# Patient Record
Sex: MALE | Race: WHITE | NOT HISPANIC OR LATINO | Employment: OTHER | URBAN - METROPOLITAN AREA
[De-identification: names, ages, dates, MRNs, and addresses within clinical notes are randomized per-mention and may not be internally consistent; named-entity substitution may affect disease eponyms.]

---

## 2017-08-23 ENCOUNTER — OFFICE VISIT (OUTPATIENT)
Dept: SURGERY | Facility: CLINIC | Age: 82
End: 2017-08-23

## 2017-08-23 VITALS
HEIGHT: 68 IN | HEART RATE: 75 BPM | WEIGHT: 205.2 LBS | SYSTOLIC BLOOD PRESSURE: 102 MMHG | BODY MASS INDEX: 31.1 KG/M2 | RESPIRATION RATE: 14 BRPM | DIASTOLIC BLOOD PRESSURE: 66 MMHG | OXYGEN SATURATION: 90 %

## 2017-08-23 DIAGNOSIS — C34.31 MALIGNANT NEOPLASM OF LOWER LOBE OF RIGHT LUNG (HCC): ICD-10-CM

## 2017-08-23 DIAGNOSIS — R91.1 NODULE OF RIGHT LUNG: Primary | ICD-10-CM

## 2017-08-23 PROCEDURE — 99213 OFFICE O/P EST LOW 20 MIN: CPT | Performed by: THORACIC SURGERY (CARDIOTHORACIC VASCULAR SURGERY)

## 2017-08-23 RX ORDER — MONTELUKAST SODIUM 10 MG/1
10 TABLET ORAL NIGHTLY
COMMUNITY

## 2017-08-23 NOTE — PROGRESS NOTES
Subjective   Patient ID: Alexander Rouse is a 81 y.o. male is here today for follow-up.    History of Present Illness  Dear Colleague,  Alexander Rouse was seen in our office today for further follow-up of a right VAT with wedge resection of a stage I adenocarcinoma of the lung performed August 2012.  He had adjuvant radiation therapy because of close margins of resection.  Approximately one year later there was a mass in the right lung that was PET positive.  Biopsy showed this only to be fibrosis thought to be secondary to the radiation.  He has had no further problems.  He has chronic obstructive pulmonary disease with chronic bronchitis.  He coughs frequently and usually brings up thick phlegm.  He has had no hemoptysis.  There has been no pleuritic pain.  He has no hoarseness or change in his voice.  His shortness of breath with exertion is essentially unchanged.  He does wheeze at times especially with exertion.  He has had no weight loss.    The following portions of the patient's history were reviewed and updated as appropriate: allergies, current medications, past family history, past medical history, past social history, past surgical history and problem list.  Review of Systems   Constitution: Negative.   HENT: Negative.    Eyes: Negative.    Cardiovascular: Negative.    Respiratory: Positive for shortness of breath and wheezing.    Endocrine: Negative.    Hematologic/Lymphatic: Negative.    Skin: Negative.    Musculoskeletal: Negative.    Gastrointestinal: Negative.    Genitourinary: Negative.    Neurological: Negative.    Psychiatric/Behavioral: Negative.      Patient Active Problem List   Diagnosis   • CAFL (chronic airflow limitation)   • Cough   • Cancer of lower lobe of lung   • Disease of larynx   • Rotator cuff tear arthropathy   • Status post complete repair of rotator cuff   • Nodule of right lung     Past Medical History:   Diagnosis Date   • Asthma    • COPD (chronic obstructive pulmonary disease)       Past Surgical History:   Procedure Laterality Date   • LUNG CANCER SURGERY     • SHOULDER SURGERY Bilateral      Family History   Problem Relation Age of Onset   • Family history unknown: Yes     Social History     Social History   • Marital status:      Spouse name: N/A   • Number of children: N/A   • Years of education: N/A     Occupational History   • Not on file.     Social History Main Topics   • Smoking status: Current Every Day Smoker   • Smokeless tobacco: Not on file   • Alcohol use No   • Drug use: No   • Sexual activity: No     Other Topics Concern   • Not on file     Social History Narrative       Current Outpatient Prescriptions:   •  albuterol (PROVENTIL) (2.5 MG/3ML) 0.083% nebulizer solution, Take 2.5 mg by nebulization every 4 (four) hours as needed for wheezing., Disp: , Rfl:   •  apixaban (ELIQUIS) 2.5 MG tablet tablet, Take 2.5 mg by mouth 2 (Two) Times a Day., Disp: , Rfl:   •  aspirin 81 MG tablet, Take  by mouth., Disp: , Rfl:   •  atorvastatin (LIPITOR) 80 MG tablet, Take  by mouth., Disp: , Rfl:   •  carvedilol (COREG) 3.125 MG tablet, Take  by mouth., Disp: , Rfl:   •  hydrochlorothiazide (MICROZIDE) 12.5 MG capsule, , Disp: , Rfl:   •  lisinopril (PRINIVIL,ZESTRIL) 5 MG tablet, , Disp: , Rfl:   •  montelukast (SINGULAIR) 10 MG tablet, Take 10 mg by mouth Every Night., Disp: , Rfl:   •  Selenium 200 MCG tablet, Take  by mouth., Disp: , Rfl:   •  selenium sulfide (SELSUN) 2.5 % shampoo, Apply  topically daily as needed for dandruff., Disp: , Rfl:   No Known Allergies     Objective   Vitals:    08/23/17 0917   BP: 102/66   Pulse: 75   Resp: 14   SpO2: 90%     Physical Exam   Constitutional: He is oriented to person, place, and time. He appears well-developed and well-nourished.   HENT:   Head: Normocephalic.   Eyes: Conjunctivae, EOM and lids are normal. Pupils are equal, round, and reactive to light.   Neck: Trachea normal and normal range of motion. Neck supple. No hepatojugular  reflux and no JVD present. Carotid bruit is not present. No thyroid mass and no thyromegaly present.   Cardiovascular: Normal rate, regular rhythm, S1 normal, S2 normal, normal heart sounds and normal pulses.   No extrasystoles are present. PMI is not displaced.    Pulmonary/Chest: Effort normal. He has decreased breath sounds in the right middle field, the right lower field and the left lower field. He has rales in the right lower field and the left lower field.   Abdominal: Soft. Normal appearance and bowel sounds are normal. He exhibits no mass. There is no hepatosplenomegaly. There is no tenderness. No hernia.   Musculoskeletal: Normal range of motion.   Neurological: He is alert and oriented to person, place, and time. He has normal strength and normal reflexes. No cranial nerve deficit or sensory deficit. He displays a negative Romberg sign.   Skin: Skin is warm, dry and intact.   Psychiatric: He has a normal mood and affect. His speech is normal and behavior is normal. Judgment and thought content normal. Cognition and memory are normal.     Independent Review of Radiographic Studies:    CT scan of the chest performed at [facility] was independently reviewed.  There is a 1.7 cm irregular density at the apex of the right lung.  There are several areas of groundglass opacity within the posterior segment of the right upper lobe.  There is a nodular density measuring 0.9 x 0.4 cm in the mid zone of the right upper lobe.  There is stable fibrosis in the medial basilar segment of the right lower lobe.  There are no new infiltrates nodules or masses.  There is no hilar or mediastinal adenopathy.  There is no pleural effusion      Assessment/Plan     This new area in the right upper lobe is indeterminate.  This could certainly represent a new lung cancer or possibly mucus plugging in the bronchiols.  To help clarify this I have scheduled the patient for CT PET scan.  Once that is completed and I have  reviewed the results I will call the patient and we will then decide our next steps.  I will keep you informed of his progress.  Thank you for allowing me to participate in the care of Mr. Rouse.    Diagnoses and all orders for this visit:    Nodule of right lung  -     NM Pet Skull Base To Mid Thigh; Future    Malignant neoplasm of lower lobe of right lung  -     NM Pet Skull Base To Mid Thigh; Future    Other orders  -     apixaban (ELIQUIS) 2.5 MG tablet tablet; Take 2.5 mg by mouth 2 (Two) Times a Day.  -     montelukast (SINGULAIR) 10 MG tablet; Take 10 mg by mouth Every Night.

## 2017-09-05 ENCOUNTER — TELEPHONE (OUTPATIENT)
Dept: SURGERY | Facility: CLINIC | Age: 82
End: 2017-09-05

## 2017-09-05 NOTE — TELEPHONE ENCOUNTER
----- Message from Zuly Motta sent at 8/29/2017  3:33 PM EDT -----  Dr. Melara,    You ordered a PET scan for Mr. Rouse.  And you wanted to call him with the results.  The report is in Epic under the Media tab.  It was done at [facility].    Thanks,  Zuly    I spoke with Mr. Rouse by telephone today September 5, 2017.  I gave him the results of the CT PET scan.  This showed no abnormal uptake.  The area in question in the right upper lobe has low level uptake and may represent scar tissue.  I recommended a short-term follow-up CT in 4 months.  Office will make arrangements and he'll return here with the results of that CT scan

## 2017-09-11 DIAGNOSIS — C34.90 MALIGNANT NEOPLASM OF LUNG, UNSPECIFIED LATERALITY, UNSPECIFIED PART OF LUNG (HCC): Primary | ICD-10-CM

## 2018-01-24 ENCOUNTER — CONVERSION ENCOUNTER (OUTPATIENT)
Dept: CARDIOLOGY | Facility: CLINIC | Age: 83
End: 2018-01-24

## 2018-01-24 ENCOUNTER — OFFICE VISIT CONVERTED (OUTPATIENT)
Dept: CARDIOLOGY | Facility: CLINIC | Age: 83
End: 2018-01-24
Attending: INTERNAL MEDICINE

## 2018-01-31 ENCOUNTER — OFFICE VISIT (OUTPATIENT)
Dept: SURGERY | Facility: CLINIC | Age: 83
End: 2018-01-31

## 2018-01-31 VITALS
OXYGEN SATURATION: 90 % | HEART RATE: 86 BPM | SYSTOLIC BLOOD PRESSURE: 122 MMHG | WEIGHT: 226 LBS | DIASTOLIC BLOOD PRESSURE: 62 MMHG | BODY MASS INDEX: 34.25 KG/M2 | HEIGHT: 68 IN

## 2018-01-31 DIAGNOSIS — Z48.3 AFTERCARE FOLLOWING SURGERY FOR CANCER OR TUMOR: Primary | ICD-10-CM

## 2018-01-31 PROCEDURE — 99213 OFFICE O/P EST LOW 20 MIN: CPT | Performed by: NURSE PRACTITIONER

## 2018-07-25 ENCOUNTER — OFFICE VISIT CONVERTED (OUTPATIENT)
Dept: CARDIOLOGY | Facility: CLINIC | Age: 83
End: 2018-07-25
Attending: INTERNAL MEDICINE

## 2018-07-25 ENCOUNTER — CONVERSION ENCOUNTER (OUTPATIENT)
Dept: CARDIOLOGY | Facility: CLINIC | Age: 83
End: 2018-07-25

## 2019-02-05 ENCOUNTER — CONVERSION ENCOUNTER (OUTPATIENT)
Dept: CARDIOLOGY | Facility: CLINIC | Age: 84
End: 2019-02-05

## 2019-02-05 ENCOUNTER — OFFICE VISIT CONVERTED (OUTPATIENT)
Dept: CARDIOLOGY | Facility: CLINIC | Age: 84
End: 2019-02-05
Attending: INTERNAL MEDICINE

## 2019-05-20 ENCOUNTER — HOSPITAL ENCOUNTER (OUTPATIENT)
Dept: OTHER | Facility: HOSPITAL | Age: 84
Discharge: HOME | End: 2019-05-20

## 2019-08-06 ENCOUNTER — CONVERSION ENCOUNTER (OUTPATIENT)
Dept: CARDIOLOGY | Facility: CLINIC | Age: 84
End: 2019-08-06

## 2019-08-06 ENCOUNTER — OFFICE VISIT CONVERTED (OUTPATIENT)
Dept: CARDIOLOGY | Facility: CLINIC | Age: 84
End: 2019-08-06
Attending: INTERNAL MEDICINE

## 2020-01-01 ENCOUNTER — OFFICE VISIT CONVERTED (OUTPATIENT)
Dept: CARDIOLOGY | Facility: CLINIC | Age: 85
End: 2020-01-01
Attending: INTERNAL MEDICINE

## 2020-01-01 ENCOUNTER — HOSPITAL ENCOUNTER (OUTPATIENT)
Dept: OTHER | Facility: HOSPITAL | Age: 85
Discharge: HOME | End: 2020-08-12
Attending: FAMILY MEDICINE

## 2020-01-01 ENCOUNTER — CONVERSION ENCOUNTER (OUTPATIENT)
Dept: OTHER | Facility: HOSPITAL | Age: 85
End: 2020-01-01

## 2020-01-01 ENCOUNTER — HOSPITAL ENCOUNTER (OUTPATIENT)
Dept: OTHER | Facility: HOSPITAL | Age: 85
Discharge: HOME | End: 2020-10-12
Attending: INTERNAL MEDICINE

## 2020-01-01 ENCOUNTER — HOSPITAL ENCOUNTER (OUTPATIENT)
Dept: OTHER | Facility: HOSPITAL | Age: 85
Discharge: HOME | End: 2020-10-22

## 2020-01-01 ENCOUNTER — HOSPITAL ENCOUNTER (OUTPATIENT)
Dept: OTHER | Facility: HOSPITAL | Age: 85
Discharge: HOME | End: 2020-09-15
Attending: INTERNAL MEDICINE

## 2020-01-01 LAB
ALBUMIN SERPL-MCNC: 3.5 G/DL (ref 3.5–5)
ALBUMIN/GLOB SERPL: 1.2 {RATIO} (ref 1.4–2.6)
ALP SERPL-CCNC: 124 U/L (ref 56–155)
ALT SERPL-CCNC: 20 U/L (ref 10–40)
ANION GAP SERPL CALC-SCNC: 12 MMOL/L (ref 8–19)
ANION GAP SERPL CALC-SCNC: 12 MMOL/L (ref 8–19)
ANION GAP SERPL CALC-SCNC: 21 MMOL/L (ref 8–19)
AST SERPL-CCNC: 27 U/L (ref 15–50)
BILIRUB SERPL-MCNC: 0.21 MG/DL (ref 0.2–1.3)
BUN SERPL-MCNC: 26 MG/DL (ref 5–25)
BUN SERPL-MCNC: 27 MG/DL (ref 5–25)
BUN SERPL-MCNC: 34 MG/DL (ref 5–25)
BUN/CREAT SERPL: 24 {RATIO} (ref 6–20)
BUN/CREAT SERPL: 25 {RATIO} (ref 6–20)
BUN/CREAT SERPL: 31 {RATIO} (ref 6–20)
CALCIUM SERPL-MCNC: 10.2 MG/DL (ref 8.7–10.4)
CALCIUM SERPL-MCNC: 8.4 MG/DL (ref 8.7–10.4)
CALCIUM SERPL-MCNC: 9.2 MG/DL (ref 8.7–10.4)
CHLORIDE SERPL-SCNC: 100 MMOL/L (ref 99–111)
CHLORIDE SERPL-SCNC: 102 MMOL/L (ref 99–111)
CHLORIDE SERPL-SCNC: 98 MMOL/L (ref 99–111)
CHOLEST SERPL-MCNC: 126 MG/DL (ref 107–200)
CHOLEST/HDLC SERPL: 2.4 {RATIO} (ref 3–6)
CONV CO2: 24 MMOL/L (ref 22–32)
CONV CO2: 30 MMOL/L (ref 22–32)
CONV CO2: 34 MMOL/L (ref 22–32)
CONV TOTAL PROTEIN: 6.4 G/DL (ref 6.3–8.2)
CREAT UR-MCNC: 0.83 MG/DL (ref 0.7–1.2)
CREAT UR-MCNC: 1.09 MG/DL (ref 0.7–1.2)
CREAT UR-MCNC: 1.44 MG/DL (ref 0.7–1.2)
GFR SERPLBLD BASED ON 1.73 SQ M-ARVRAT: 44 ML/MIN/{1.73_M2}
GFR SERPLBLD BASED ON 1.73 SQ M-ARVRAT: >60 ML/MIN/{1.73_M2}
GFR SERPLBLD BASED ON 1.73 SQ M-ARVRAT: >60 ML/MIN/{1.73_M2}
GLOBULIN UR ELPH-MCNC: 2.9 G/DL (ref 2–3.5)
GLUCOSE SERPL-MCNC: 109 MG/DL (ref 70–99)
GLUCOSE SERPL-MCNC: 147 MG/DL (ref 70–99)
GLUCOSE SERPL-MCNC: 69 MG/DL (ref 70–99)
HDLC SERPL-MCNC: 52 MG/DL (ref 40–60)
LDLC SERPL CALC-MCNC: 59 MG/DL (ref 70–100)
OSMOLALITY SERPL CALC.SUM OF ELEC: 296 MOSM/KG (ref 273–304)
OSMOLALITY SERPL CALC.SUM OF ELEC: 297 MOSM/KG (ref 273–304)
OSMOLALITY SERPL CALC.SUM OF ELEC: 298 MOSM/KG (ref 273–304)
POTASSIUM SERPL-SCNC: 3.9 MMOL/L (ref 3.5–5.3)
POTASSIUM SERPL-SCNC: 4 MMOL/L (ref 3.5–5.3)
POTASSIUM SERPL-SCNC: 4.4 MMOL/L (ref 3.5–5.3)
SODIUM SERPL-SCNC: 140 MMOL/L (ref 135–147)
SODIUM SERPL-SCNC: 140 MMOL/L (ref 135–147)
SODIUM SERPL-SCNC: 141 MMOL/L (ref 135–147)
TRIGL SERPL-MCNC: 76 MG/DL (ref 40–150)
VLDLC SERPL-MCNC: 15 MG/DL (ref 5–37)

## 2020-01-02 ENCOUNTER — HOSPITAL ENCOUNTER (OUTPATIENT)
Dept: OTHER | Facility: HOSPITAL | Age: 85
Discharge: HOME | End: 2020-01-02
Attending: FAMILY MEDICINE

## 2020-01-02 LAB
BASOPHILS # BLD AUTO: 0.02 10*3/UL (ref 0–0.2)
BASOPHILS NFR BLD AUTO: 0.1 % (ref 0–3)
CONV ABS IMM GRAN: 0.08 10*3/UL (ref 0–0.2)
CONV IMMATURE GRAN: 0.6 % (ref 0–1.8)
DEPRECATED RDW RBC AUTO: 48.1 FL (ref 35.1–43.9)
EOSINOPHIL # BLD AUTO: 0.01 10*3/UL (ref 0–0.7)
EOSINOPHIL # BLD AUTO: 0.1 % (ref 0–7)
ERYTHROCYTE [DISTWIDTH] IN BLOOD BY AUTOMATED COUNT: 15 % (ref 11.6–14.4)
HCT VFR BLD AUTO: 37 % (ref 42–52)
HGB BLD-MCNC: 12.2 G/DL (ref 14–18)
LYMPHOCYTES # BLD AUTO: 0.57 10*3/UL (ref 1–5)
LYMPHOCYTES NFR BLD AUTO: 4 % (ref 20–45)
MCH RBC QN AUTO: 29 PG (ref 27–31)
MCHC RBC AUTO-ENTMCNC: 33 G/DL (ref 33–37)
MCV RBC AUTO: 87.9 FL (ref 80–96)
MONOCYTES # BLD AUTO: 0.82 10*3/UL (ref 0.2–1.2)
MONOCYTES NFR BLD AUTO: 5.7 % (ref 3–10)
NEUTROPHILS # BLD AUTO: 12.81 10*3/UL (ref 2–8)
NEUTROPHILS NFR BLD AUTO: 89.5 % (ref 30–85)
NRBC CBCN: 0 % (ref 0–0.7)
PLATELET # BLD AUTO: 191 10*3/UL (ref 130–400)
PMV BLD AUTO: 12.7 FL (ref 9.4–12.4)
RBC # BLD AUTO: 4.21 10*6/UL (ref 4.7–6.1)
WBC # BLD AUTO: 14.31 10*3/UL (ref 4.8–10.8)

## 2020-01-03 LAB
IRON SATN MFR SERPL: 32 % (ref 20–55)
IRON SERPL-MCNC: 92 UG/DL (ref 70–180)
TIBC SERPL-MCNC: 285 UG/DL (ref 245–450)
TRANSFERRIN SERPL-MCNC: 199 MG/DL (ref 215–365)

## 2020-02-10 ENCOUNTER — HOSPITAL ENCOUNTER (OUTPATIENT)
Dept: OTHER | Facility: HOSPITAL | Age: 85
Discharge: HOME | End: 2020-02-10
Attending: INTERNAL MEDICINE

## 2020-02-10 LAB
BASOPHILS # BLD MANUAL: 0.05 10*3/UL (ref 0–0.2)
BASOPHILS NFR BLD MANUAL: 0.5 % (ref 0–3)
CHOLEST SERPL-MCNC: 121 MG/DL (ref 107–200)
CHOLEST/HDLC SERPL: 2.1 {RATIO} (ref 3–6)
DEPRECATED RDW RBC AUTO: 52.3 FL
EOSINOPHIL # BLD MANUAL: 0.15 10*3/UL (ref 0–0.7)
EOSINOPHIL NFR BLD MANUAL: 1.4 % (ref 0–7)
ERYTHROCYTE [DISTWIDTH] IN BLOOD BY AUTOMATED COUNT: 15.9 % (ref 11.5–14.5)
GRANS (ABSOLUTE): 8.26 10*3/UL (ref 2–8)
GRANS: 76.3 % (ref 30–85)
HBA1C MFR BLD: 12.5 G/DL (ref 14–18)
HCT VFR BLD AUTO: 38.7 % (ref 42–52)
HDLC SERPL-MCNC: 59 MG/DL (ref 40–60)
IMM GRANULOCYTES # BLD: 0.09 10*3/UL (ref 0–0.54)
IMM GRANULOCYTES NFR BLD: 0.8 % (ref 0–0.43)
LDLC SERPL CALC-MCNC: 52 MG/DL (ref 70–100)
LYMPHOCYTES # BLD MANUAL: 1.18 10*3/UL (ref 1–5)
LYMPHOCYTES NFR BLD MANUAL: 10.1 % (ref 3–10)
MCH RBC QN AUTO: 28.7 PG (ref 27–31)
MCHC RBC AUTO-ENTMCNC: 32.3 G/DL (ref 33–37)
MCV RBC AUTO: 88.8 FL (ref 80–96)
MONOCYTES # BLD AUTO: 1.09 10*3/UL (ref 0.2–1.2)
PLATELET # BLD AUTO: 199 10*3/UL (ref 130–400)
PMV BLD AUTO: 11.8 FL (ref 7.4–10.4)
RBC # BLD AUTO: 4.36 10*6/UL (ref 4.7–6.1)
TRIGL SERPL-MCNC: 50 MG/DL (ref 40–150)
VARIANT LYMPHS NFR BLD MANUAL: 10.9 % (ref 20–45)
VLDLC SERPL-MCNC: 10 MG/DL (ref 5–37)
WBC # BLD AUTO: 10.82 10*3/UL (ref 4.8–10.8)

## 2020-02-12 ENCOUNTER — OFFICE VISIT CONVERTED (OUTPATIENT)
Dept: CARDIOLOGY | Facility: CLINIC | Age: 85
End: 2020-02-12
Attending: INTERNAL MEDICINE

## 2020-03-18 ENCOUNTER — HOSPITAL ENCOUNTER (OUTPATIENT)
Dept: OTHER | Facility: HOSPITAL | Age: 85
Discharge: HOME | End: 2020-03-18
Attending: FAMILY MEDICINE

## 2020-03-18 LAB
APPEARANCE UR: ABNORMAL
BACTERIA UR QL AUTO: ABNORMAL
BILIRUB UR QL: NEGATIVE
CASTS URNS QL MICRO: ABNORMAL /[LPF]
COLOR UR: ABNORMAL
CONV LEUKOCYTE ESTERASE: NEGATIVE
CONV UROBILINOGEN IN URINE BY AUTOMATED TEST STRIP: 1 {EHRLICHU}/DL (ref 0.1–1)
EPI CELLS #/AREA URNS HPF: ABNORMAL /[HPF]
GLUCOSE 24H UR-MCNC: NEGATIVE MG/DL
HGB UR QL STRIP: NEGATIVE
KETONES UR QL STRIP: NEGATIVE MG/DL
MUCOUS THREADS URNS QL MICRO: ABNORMAL
NITRITE UR-MCNC: NEGATIVE MG/ML
PH UR STRIP.AUTO: 5 [PH] (ref 5–8)
PROT UR-MCNC: NEGATIVE MG/DL
RBC # BLD AUTO: ABNORMAL /[HPF]
SP GR UR STRIP: 1.02 (ref 1–1.03)
SPECIMEN SOURCE: ABNORMAL
UNIDENT CRYS URNS QL MICRO: ABNORMAL /[HPF]
WBC #/AREA URNS HPF: ABNORMAL /[HPF]

## 2020-03-20 LAB — BACTERIA UR CULT: NORMAL

## 2020-05-28 ENCOUNTER — HOSPITAL ENCOUNTER (OUTPATIENT)
Dept: OTHER | Facility: HOSPITAL | Age: 85
Discharge: HOME | End: 2020-05-28

## 2021-01-01 ENCOUNTER — OFFICE VISIT CONVERTED (OUTPATIENT)
Dept: CARDIOLOGY | Facility: CLINIC | Age: 86
End: 2021-01-01
Attending: INTERNAL MEDICINE

## 2021-01-01 ENCOUNTER — APPOINTMENT (OUTPATIENT)
Dept: MRI IMAGING | Facility: HOSPITAL | Age: 86
End: 2021-01-01

## 2021-01-01 ENCOUNTER — APPOINTMENT (OUTPATIENT)
Dept: CARDIOLOGY | Facility: HOSPITAL | Age: 86
End: 2021-01-01

## 2021-01-01 ENCOUNTER — APPOINTMENT (OUTPATIENT)
Dept: CT IMAGING | Facility: HOSPITAL | Age: 86
End: 2021-01-01

## 2021-01-01 ENCOUNTER — HOSPITAL ENCOUNTER (INPATIENT)
Facility: HOSPITAL | Age: 86
LOS: 8 days | End: 2021-08-10
Attending: EMERGENCY MEDICINE | Admitting: INTERNAL MEDICINE

## 2021-01-01 ENCOUNTER — APPOINTMENT (OUTPATIENT)
Dept: NEUROLOGY | Facility: HOSPITAL | Age: 86
End: 2021-01-01

## 2021-01-01 ENCOUNTER — ANESTHESIA (OUTPATIENT)
Dept: GASTROENTEROLOGY | Facility: HOSPITAL | Age: 86
End: 2021-01-01

## 2021-01-01 ENCOUNTER — CONVERSION ENCOUNTER (OUTPATIENT)
Dept: OTHER | Facility: HOSPITAL | Age: 86
End: 2021-01-01

## 2021-01-01 ENCOUNTER — APPOINTMENT (OUTPATIENT)
Dept: GENERAL RADIOLOGY | Facility: HOSPITAL | Age: 86
End: 2021-01-01

## 2021-01-01 ENCOUNTER — HOSPITAL ENCOUNTER (OUTPATIENT)
Dept: OTHER | Facility: HOSPITAL | Age: 86
Discharge: HOME | End: 2021-04-15
Attending: INTERNAL MEDICINE

## 2021-01-01 ENCOUNTER — HOSPITAL ENCOUNTER (OUTPATIENT)
Dept: OTHER | Facility: HOSPITAL | Age: 86
Discharge: HOME | End: 2021-01-11
Attending: INTERNAL MEDICINE

## 2021-01-01 ENCOUNTER — ANESTHESIA EVENT (OUTPATIENT)
Dept: GASTROENTEROLOGY | Facility: HOSPITAL | Age: 86
End: 2021-01-01

## 2021-01-01 ENCOUNTER — HOSPITAL ENCOUNTER (INPATIENT)
Facility: HOSPITAL | Age: 86
LOS: 6 days | Discharge: NURSING, REHAB OR LONG-TERM CARE | End: 2021-07-20
Attending: EMERGENCY MEDICINE | Admitting: INTERNAL MEDICINE

## 2021-01-01 ENCOUNTER — HOSPITAL ENCOUNTER (OUTPATIENT)
Dept: OTHER | Facility: HOSPITAL | Age: 86
Discharge: HOME | End: 2021-01-27
Attending: FAMILY MEDICINE

## 2021-01-01 VITALS
WEIGHT: 199 LBS | HEIGHT: 69 IN | HEART RATE: 88 BPM | DIASTOLIC BLOOD PRESSURE: 70 MMHG | SYSTOLIC BLOOD PRESSURE: 108 MMHG | BODY MASS INDEX: 29.47 KG/M2

## 2021-01-01 VITALS
WEIGHT: 227 LBS | HEIGHT: 69 IN | HEART RATE: 81 BPM | SYSTOLIC BLOOD PRESSURE: 112 MMHG | BODY MASS INDEX: 33.62 KG/M2 | WEIGHT: 207 LBS | DIASTOLIC BLOOD PRESSURE: 74 MMHG | SYSTOLIC BLOOD PRESSURE: 132 MMHG | HEIGHT: 69 IN | DIASTOLIC BLOOD PRESSURE: 56 MMHG | HEART RATE: 86 BPM | BODY MASS INDEX: 30.66 KG/M2

## 2021-01-01 VITALS
SYSTOLIC BLOOD PRESSURE: 110 MMHG | RESPIRATION RATE: 15 BRPM | WEIGHT: 202.16 LBS | HEIGHT: 71 IN | OXYGEN SATURATION: 92 % | HEART RATE: 75 BPM | TEMPERATURE: 97.8 F | DIASTOLIC BLOOD PRESSURE: 62 MMHG | BODY MASS INDEX: 28.3 KG/M2

## 2021-01-01 VITALS
BODY MASS INDEX: 31.7 KG/M2 | WEIGHT: 214 LBS | SYSTOLIC BLOOD PRESSURE: 132 MMHG | HEIGHT: 69 IN | DIASTOLIC BLOOD PRESSURE: 68 MMHG | HEART RATE: 90 BPM

## 2021-01-01 VITALS
TEMPERATURE: 98.3 F | OXYGEN SATURATION: 78 % | BODY MASS INDEX: 30.01 KG/M2 | RESPIRATION RATE: 22 BRPM | HEART RATE: 115 BPM | HEIGHT: 72 IN | DIASTOLIC BLOOD PRESSURE: 51 MMHG | SYSTOLIC BLOOD PRESSURE: 95 MMHG | WEIGHT: 221.56 LBS

## 2021-01-01 VITALS
HEART RATE: 93 BPM | HEIGHT: 69 IN | BODY MASS INDEX: 30.96 KG/M2 | WEIGHT: 209 LBS | SYSTOLIC BLOOD PRESSURE: 85 MMHG | DIASTOLIC BLOOD PRESSURE: 49 MMHG

## 2021-01-01 VITALS
HEIGHT: 69 IN | BODY MASS INDEX: 30.96 KG/M2 | WEIGHT: 209 LBS | SYSTOLIC BLOOD PRESSURE: 142 MMHG | HEART RATE: 88 BPM | DIASTOLIC BLOOD PRESSURE: 74 MMHG

## 2021-01-01 VITALS
HEIGHT: 69 IN | BODY MASS INDEX: 30.66 KG/M2 | SYSTOLIC BLOOD PRESSURE: 117 MMHG | WEIGHT: 207 LBS | HEART RATE: 87 BPM | DIASTOLIC BLOOD PRESSURE: 61 MMHG

## 2021-01-01 VITALS
WEIGHT: 212 LBS | HEART RATE: 84 BPM | SYSTOLIC BLOOD PRESSURE: 88 MMHG | HEIGHT: 69 IN | DIASTOLIC BLOOD PRESSURE: 64 MMHG | BODY MASS INDEX: 31.4 KG/M2

## 2021-01-01 VITALS
DIASTOLIC BLOOD PRESSURE: 57 MMHG | WEIGHT: 189 LBS | BODY MASS INDEX: 27.99 KG/M2 | SYSTOLIC BLOOD PRESSURE: 102 MMHG | HEIGHT: 69 IN | HEART RATE: 85 BPM

## 2021-01-01 DIAGNOSIS — J18.9 PNEUMONIA OF LEFT LOWER LOBE DUE TO INFECTIOUS ORGANISM: Primary | ICD-10-CM

## 2021-01-01 DIAGNOSIS — R77.8 ELEVATED TROPONIN: ICD-10-CM

## 2021-01-01 DIAGNOSIS — R26.2 DIFFICULTY WALKING: ICD-10-CM

## 2021-01-01 DIAGNOSIS — R93.89 ABNORMAL CHEST CT: ICD-10-CM

## 2021-01-01 DIAGNOSIS — J98.4 CAVITARY LESION OF LUNG: Primary | ICD-10-CM

## 2021-01-01 DIAGNOSIS — Z78.9 DECREASED ACTIVITIES OF DAILY LIVING (ADL): ICD-10-CM

## 2021-01-01 DIAGNOSIS — R13.12 DYSPHAGIA, OROPHARYNGEAL: ICD-10-CM

## 2021-01-01 DIAGNOSIS — J96.21 ACUTE ON CHRONIC RESPIRATORY FAILURE WITH HYPOXIA (HCC): ICD-10-CM

## 2021-01-01 DIAGNOSIS — R13.12 OROPHARYNGEAL DYSPHAGIA: ICD-10-CM

## 2021-01-01 LAB
1,3 BETA GLUCAN SER-MCNC: <31 PG/ML
A FLAVUS AB SER QL ID: NEGATIVE
A FUMIGATUS AB SER QL ID: NEGATIVE
A FUMIGATUS IGE QN: <0.1 KU/L
A NIGER AB SER QL ID: NEGATIVE
ACANTHOCYTES BLD QL SMEAR: NORMAL
ACB CMPLX DNA BAL NAA+NON-PRB-NCNCRNG: NOT DETECTED
ALBUMIN SERPL-MCNC: 2.9 G/DL (ref 3.5–5.2)
ALBUMIN SERPL-MCNC: 2.9 G/DL (ref 3.5–5.2)
ALBUMIN SERPL-MCNC: 3.4 G/DL (ref 3.5–5.2)
ALBUMIN SERPL-MCNC: 3.6 G/DL (ref 3.5–5)
ALBUMIN SERPL-MCNC: 3.7 G/DL (ref 3.5–5)
ALBUMIN SERPL-MCNC: 3.8 G/DL (ref 3.5–5.2)
ALBUMIN/GLOB SERPL: 1 G/DL
ALBUMIN/GLOB SERPL: 1.2 G/DL
ALBUMIN/GLOB SERPL: 1.3 {RATIO} (ref 1.4–2.6)
ALBUMIN/GLOB SERPL: 1.3 {RATIO} (ref 1.4–2.6)
ALBUMIN/GLOB SERPL: 1.5 G/DL
ALBUMIN/GLOB SERPL: 1.7 G/DL
ALP SERPL-CCNC: 103 U/L (ref 39–117)
ALP SERPL-CCNC: 103 U/L (ref 39–117)
ALP SERPL-CCNC: 126 U/L (ref 56–155)
ALP SERPL-CCNC: 129 U/L (ref 56–155)
ALP SERPL-CCNC: 90 U/L (ref 39–117)
ALP SERPL-CCNC: 91 U/L (ref 39–117)
ALT SERPL W P-5'-P-CCNC: 15 U/L (ref 1–41)
ALT SERPL W P-5'-P-CCNC: 16 U/L (ref 1–41)
ALT SERPL W P-5'-P-CCNC: 19 U/L (ref 1–41)
ALT SERPL W P-5'-P-CCNC: 23 U/L (ref 1–41)
ALT SERPL-CCNC: 13 U/L (ref 10–40)
ALT SERPL-CCNC: 13 U/L (ref 10–40)
ANION GAP SERPL CALC-SCNC: 10 MMOL/L (ref 8–19)
ANION GAP SERPL CALC-SCNC: 15 MMOL/L (ref 8–19)
ANION GAP SERPL CALCULATED.3IONS-SCNC: 11.4 MMOL/L (ref 5–15)
ANION GAP SERPL CALCULATED.3IONS-SCNC: 4.2 MMOL/L (ref 5–15)
ANION GAP SERPL CALCULATED.3IONS-SCNC: 4.6 MMOL/L (ref 5–15)
ANION GAP SERPL CALCULATED.3IONS-SCNC: 6.1 MMOL/L (ref 5–15)
ANION GAP SERPL CALCULATED.3IONS-SCNC: 6.2 MMOL/L (ref 5–15)
ANION GAP SERPL CALCULATED.3IONS-SCNC: 6.3 MMOL/L (ref 5–15)
ANION GAP SERPL CALCULATED.3IONS-SCNC: 6.7 MMOL/L (ref 5–15)
ANION GAP SERPL CALCULATED.3IONS-SCNC: 6.9 MMOL/L (ref 5–15)
ANION GAP SERPL CALCULATED.3IONS-SCNC: 7.4 MMOL/L (ref 5–15)
ANION GAP SERPL CALCULATED.3IONS-SCNC: 8.2 MMOL/L (ref 5–15)
ANION GAP SERPL CALCULATED.3IONS-SCNC: 8.5 MMOL/L (ref 5–15)
ANION GAP SERPL CALCULATED.3IONS-SCNC: 9.6 MMOL/L (ref 5–15)
ANION GAP SERPL CALCULATED.3IONS-SCNC: 9.8 MMOL/L (ref 5–15)
ANION GAP SERPL CALCULATED.3IONS-SCNC: 9.9 MMOL/L (ref 5–15)
ANISOCYTOSIS BLD QL: NORMAL
APPEARANCE UR: CLEAR
APPEARANCE UR: CLEAR
ARTERIAL PATENCY WRIST A: POSITIVE
AST SERPL-CCNC: 15 U/L (ref 15–50)
AST SERPL-CCNC: 15 U/L (ref 1–40)
AST SERPL-CCNC: 18 U/L (ref 15–50)
AST SERPL-CCNC: 18 U/L (ref 1–40)
BACTERIA SPEC AEROBE CULT: NORMAL
BACTERIA SPEC RESP CULT: NORMAL
BACTERIA UR CULT: NORMAL
BACTERIA UR CULT: NORMAL
BACTERIA UR QL AUTO: ABNORMAL
BASE EXCESS BLDA CALC-SCNC: 12.6 MMOL/L (ref -2–2)
BASE EXCESS BLDA CALC-SCNC: 6 MMOL/L (ref -2–2)
BASE EXCESS BLDA CALC-SCNC: 8 MMOL/L (ref -2–2)
BASOPHILS # BLD AUTO: 0.01 10*3/MM3 (ref 0–0.2)
BASOPHILS # BLD AUTO: 0.02 10*3/MM3 (ref 0–0.2)
BASOPHILS # BLD AUTO: 0.04 10*3/MM3 (ref 0–0.2)
BASOPHILS # BLD AUTO: 0.09 10*3/MM3 (ref 0–0.2)
BASOPHILS NFR BLD AUTO: 0.1 % (ref 0–1.5)
BASOPHILS NFR BLD AUTO: 0.2 % (ref 0–1.5)
BASOPHILS NFR BLD AUTO: 0.3 % (ref 0–1.5)
BASOPHILS NFR BLD AUTO: 0.4 % (ref 0–1.5)
BASOPHILS NFR BLD AUTO: 0.5 % (ref 0–1.5)
BDY SITE: ABNORMAL
BH CV ECHO MEAS - EF(MOD-BP): 23 %
BH CV ECHO MEAS - IVSD: 1.1 CM
BH CV ECHO MEAS - LVIDD: 6.5 CM
BH CV ECHO MEAS - LVIDS: 5.8 CM
BH CV ECHO MEAS - LVPWD: 0.8 CM
BILIRUB SERPL-MCNC: 0.27 MG/DL (ref 0.2–1.3)
BILIRUB SERPL-MCNC: 0.34 MG/DL (ref 0.2–1.3)
BILIRUB SERPL-MCNC: 0.5 MG/DL (ref 0–1.2)
BILIRUB SERPL-MCNC: 0.5 MG/DL (ref 0–1.2)
BILIRUB SERPL-MCNC: 0.7 MG/DL (ref 0–1.2)
BILIRUB SERPL-MCNC: 0.9 MG/DL (ref 0–1.2)
BILIRUB UR QL STRIP: NEGATIVE
BILIRUB UR QL: NEGATIVE
BILIRUB UR QL: NEGATIVE
BLACTX-M ISLT/SPM QL: NOT DETECTED
BLAIMP ISLT/SPM QL: NOT DETECTED
BLAKPC ISLT/SPM QL: NOT DETECTED
BLAOXA-48-LIKE ISLT/SPM QL: NOT DETECTED
BLAVIM ISLT/SPM QL: NOT DETECTED
BUN SERPL-MCNC: 14 MG/DL (ref 8–23)
BUN SERPL-MCNC: 15 MG/DL (ref 8–23)
BUN SERPL-MCNC: 16 MG/DL (ref 8–23)
BUN SERPL-MCNC: 16 MG/DL (ref 8–23)
BUN SERPL-MCNC: 17 MG/DL (ref 8–23)
BUN SERPL-MCNC: 22 MG/DL (ref 5–25)
BUN SERPL-MCNC: 22 MG/DL (ref 8–23)
BUN SERPL-MCNC: 27 MG/DL (ref 5–25)
BUN SERPL-MCNC: 27 MG/DL (ref 8–23)
BUN SERPL-MCNC: 27 MG/DL (ref 8–23)
BUN SERPL-MCNC: 28 MG/DL (ref 8–23)
BUN SERPL-MCNC: 30 MG/DL (ref 8–23)
BUN SERPL-MCNC: 38 MG/DL (ref 8–23)
BUN SERPL-MCNC: 54 MG/DL (ref 8–23)
BUN SERPL-MCNC: 65 MG/DL (ref 8–23)
BUN SERPL-MCNC: 68 MG/DL (ref 8–23)
BUN/CREAT SERPL: 16.5 (ref 7–25)
BUN/CREAT SERPL: 17.1 (ref 7–25)
BUN/CREAT SERPL: 19.5 (ref 7–25)
BUN/CREAT SERPL: 20 (ref 7–25)
BUN/CREAT SERPL: 20.2 (ref 7–25)
BUN/CREAT SERPL: 22 {RATIO} (ref 6–20)
BUN/CREAT SERPL: 24.8 (ref 7–25)
BUN/CREAT SERPL: 25 {RATIO} (ref 6–20)
BUN/CREAT SERPL: 25.3 (ref 7–25)
BUN/CREAT SERPL: 26.5 (ref 7–25)
BUN/CREAT SERPL: 28.6 (ref 7–25)
BUN/CREAT SERPL: 28.8 (ref 7–25)
BUN/CREAT SERPL: 30.9 (ref 7–25)
BUN/CREAT SERPL: 33.8 (ref 7–25)
BUN/CREAT SERPL: 37.4 (ref 7–25)
BUN/CREAT SERPL: 38.9 (ref 7–25)
BURR CELLS BLD QL SMEAR: NORMAL
C PNEUM DNA NPH QL NAA+NON-PROBE: NOT DETECTED
C3 FRG RBC-MCNC: NORMAL
CA-I BLDA-SCNC: 1.12 MMOL/L (ref 1.13–1.32)
CALCIUM SERPL-MCNC: 8.7 MG/DL (ref 8.7–10.4)
CALCIUM SERPL-MCNC: 9.2 MG/DL (ref 8.7–10.4)
CALCIUM SPEC-SCNC: 7.9 MG/DL (ref 8.6–10.5)
CALCIUM SPEC-SCNC: 7.9 MG/DL (ref 8.6–10.5)
CALCIUM SPEC-SCNC: 8.2 MG/DL (ref 8.6–10.5)
CALCIUM SPEC-SCNC: 8.3 MG/DL (ref 8.6–10.5)
CALCIUM SPEC-SCNC: 8.4 MG/DL (ref 8.6–10.5)
CALCIUM SPEC-SCNC: 8.4 MG/DL (ref 8.6–10.5)
CALCIUM SPEC-SCNC: 8.5 MG/DL (ref 8.6–10.5)
CALCIUM SPEC-SCNC: 8.6 MG/DL (ref 8.6–10.5)
CALCIUM SPEC-SCNC: 8.6 MG/DL (ref 8.6–10.5)
CALCIUM SPEC-SCNC: 8.7 MG/DL (ref 8.6–10.5)
CALCIUM SPEC-SCNC: 8.7 MG/DL (ref 8.6–10.5)
CALCIUM SPEC-SCNC: 8.8 MG/DL (ref 8.6–10.5)
CALCIUM SPEC-SCNC: 8.8 MG/DL (ref 8.6–10.5)
CALCIUM SPEC-SCNC: 8.9 MG/DL (ref 8.6–10.5)
CASTS URNS QL MICRO: ABNORMAL /[LPF]
CHLORIDE BLDA-SCNC: 98 MMOL/L (ref 98–106)
CHLORIDE SERPL-SCNC: 101 MMOL/L (ref 98–107)
CHLORIDE SERPL-SCNC: 102 MMOL/L (ref 98–107)
CHLORIDE SERPL-SCNC: 105 MMOL/L (ref 98–107)
CHLORIDE SERPL-SCNC: 107 MMOL/L (ref 98–107)
CHLORIDE SERPL-SCNC: 96 MMOL/L (ref 98–107)
CHLORIDE SERPL-SCNC: 97 MMOL/L (ref 98–107)
CHLORIDE SERPL-SCNC: 97 MMOL/L (ref 98–107)
CHLORIDE SERPL-SCNC: 97 MMOL/L (ref 99–111)
CHLORIDE SERPL-SCNC: 98 MMOL/L (ref 98–107)
CHLORIDE SERPL-SCNC: 98 MMOL/L (ref 99–111)
CHLORIDE SERPL-SCNC: 99 MMOL/L (ref 98–107)
CHLORIDE SERPL-SCNC: 99 MMOL/L (ref 98–107)
CLARITY UR: CLEAR
CLUMPED PLATELETS: PRESENT
CLUMPED PLATELETS: PRESENT
CMV DNA SPEC QL NAA+PROBE: NORMAL
CO2 SERPL-SCNC: 31.9 MMOL/L (ref 22–29)
CO2 SERPL-SCNC: 32.4 MMOL/L (ref 22–29)
CO2 SERPL-SCNC: 33.3 MMOL/L (ref 22–29)
CO2 SERPL-SCNC: 33.6 MMOL/L (ref 22–29)
CO2 SERPL-SCNC: 33.8 MMOL/L (ref 22–29)
CO2 SERPL-SCNC: 34.8 MMOL/L (ref 22–29)
CO2 SERPL-SCNC: 35.1 MMOL/L (ref 22–29)
CO2 SERPL-SCNC: 36.1 MMOL/L (ref 22–29)
CO2 SERPL-SCNC: 36.6 MMOL/L (ref 22–29)
CO2 SERPL-SCNC: 36.7 MMOL/L (ref 22–29)
CO2 SERPL-SCNC: 36.8 MMOL/L (ref 22–29)
CO2 SERPL-SCNC: 37.2 MMOL/L (ref 22–29)
CO2 SERPL-SCNC: 37.5 MMOL/L (ref 22–29)
CO2 SERPL-SCNC: 38.4 MMOL/L (ref 22–29)
COHGB MFR BLD: 0.1 % (ref 0–1.5)
COHGB MFR BLD: 0.2 % (ref 0–1.5)
COHGB MFR BLD: 1 % (ref 0–1.5)
COLOR UR: YELLOW
CONV CO2: 32 MMOL/L (ref 22–32)
CONV CO2: 34 MMOL/L (ref 22–32)
CONV LEUKOCYTE ESTERASE: NEGATIVE
CONV LEUKOCYTE ESTERASE: NEGATIVE
CONV TOTAL PROTEIN: 6.4 G/DL (ref 6.3–8.2)
CONV TOTAL PROTEIN: 6.5 G/DL (ref 6.3–8.2)
CONV UROBILINOGEN IN URINE BY AUTOMATED TEST STRIP: 0.2 {EHRLICHU}/DL (ref 0.1–1)
CONV UROBILINOGEN IN URINE BY AUTOMATED TEST STRIP: 0.2 {EHRLICHU}/DL (ref 0.1–1)
CREAT SERPL-MCNC: 0.8 MG/DL (ref 0.76–1.27)
CREAT SERPL-MCNC: 0.82 MG/DL (ref 0.76–1.27)
CREAT SERPL-MCNC: 0.82 MG/DL (ref 0.76–1.27)
CREAT SERPL-MCNC: 0.84 MG/DL (ref 0.76–1.27)
CREAT SERPL-MCNC: 0.87 MG/DL (ref 0.76–1.27)
CREAT SERPL-MCNC: 0.91 MG/DL (ref 0.76–1.27)
CREAT SERPL-MCNC: 0.97 MG/DL (ref 0.76–1.27)
CREAT SERPL-MCNC: 0.98 MG/DL (ref 0.76–1.27)
CREAT SERPL-MCNC: 1.02 MG/DL (ref 0.76–1.27)
CREAT SERPL-MCNC: 1.09 MG/DL (ref 0.76–1.27)
CREAT SERPL-MCNC: 1.32 MG/DL (ref 0.76–1.27)
CREAT SERPL-MCNC: 1.6 MG/DL (ref 0.76–1.27)
CREAT SERPL-MCNC: 1.67 MG/DL (ref 0.76–1.27)
CREAT SERPL-MCNC: 1.82 MG/DL (ref 0.76–1.27)
CREAT UR-MCNC: 1 MG/DL (ref 0.7–1.2)
CREAT UR-MCNC: 1.06 MG/DL (ref 0.7–1.2)
CYTO UR: NORMAL
CYTO UR: NORMAL
D-LACTATE SERPL-SCNC: 0.8 MMOL/L (ref 0.5–2)
D-LACTATE SERPL-SCNC: 1.1 MMOL/L (ref 0.5–2)
DEPRECATED RDW RBC AUTO: 59.6 FL (ref 37–54)
DEPRECATED RDW RBC AUTO: 59.8 FL (ref 37–54)
DEPRECATED RDW RBC AUTO: 60 FL (ref 37–54)
DEPRECATED RDW RBC AUTO: 61.1 FL (ref 37–54)
DEPRECATED RDW RBC AUTO: 61.3 FL (ref 37–54)
DEPRECATED RDW RBC AUTO: 61.6 FL (ref 37–54)
DEPRECATED RDW RBC AUTO: 62.9 FL (ref 37–54)
DEPRECATED RDW RBC AUTO: 63.2 FL (ref 37–54)
DEPRECATED RDW RBC AUTO: 63.7 FL (ref 37–54)
DEPRECATED RDW RBC AUTO: 64.3 FL (ref 37–54)
DEPRECATED RDW RBC AUTO: 65.2 FL (ref 37–54)
DEPRECATED RDW RBC AUTO: 66 FL (ref 37–54)
DEPRECATED RDW RBC AUTO: 66.4 FL (ref 37–54)
E CLOAC COMP DNA BAL NAA+NON-PRB-NCNCRNG: NOT DETECTED
E COLI DNA BAL NAA+NON-PRB-NCNCRNG: NOT DETECTED
EOSINOPHIL # BLD AUTO: 0 10*3/MM3 (ref 0–0.4)
EOSINOPHIL # BLD AUTO: 0 10*3/MM3 (ref 0–0.4)
EOSINOPHIL # BLD AUTO: 0.01 10*3/MM3 (ref 0–0.4)
EOSINOPHIL # BLD AUTO: 0.01 10*3/MM3 (ref 0–0.4)
EOSINOPHIL # BLD AUTO: 0.04 10*3/MM3 (ref 0–0.4)
EOSINOPHIL # BLD AUTO: 0.05 10*3/MM3 (ref 0–0.4)
EOSINOPHIL # BLD AUTO: 0.11 10*3/MM3 (ref 0–0.4)
EOSINOPHIL # BLD AUTO: 0.13 10*3/MM3 (ref 0–0.4)
EOSINOPHIL # BLD AUTO: 0.13 10*3/MM3 (ref 0–0.4)
EOSINOPHIL # BLD AUTO: 0.3 10*3/MM3 (ref 0–0.4)
EOSINOPHIL # BLD AUTO: 0.34 10*3/MM3 (ref 0–0.4)
EOSINOPHIL NFR BLD AUTO: 0 % (ref 0.3–6.2)
EOSINOPHIL NFR BLD AUTO: 0.1 % (ref 0.3–6.2)
EOSINOPHIL NFR BLD AUTO: 0.5 % (ref 0.3–6.2)
EOSINOPHIL NFR BLD AUTO: 0.6 % (ref 0.3–6.2)
EOSINOPHIL NFR BLD AUTO: 0.8 % (ref 0.3–6.2)
EOSINOPHIL NFR BLD AUTO: 1.1 % (ref 0.3–6.2)
EOSINOPHIL NFR BLD AUTO: 1.1 % (ref 0.3–6.2)
EOSINOPHIL NFR BLD AUTO: 2.7 % (ref 0.3–6.2)
EOSINOPHIL NFR BLD AUTO: 3.1 % (ref 0.3–6.2)
EPI CELLS #/AREA URNS HPF: ABNORMAL /[HPF]
ERYTHROCYTE [DISTWIDTH] IN BLOOD BY AUTOMATED COUNT: 21.7 % (ref 12.3–15.4)
ERYTHROCYTE [DISTWIDTH] IN BLOOD BY AUTOMATED COUNT: 21.8 % (ref 12.3–15.4)
ERYTHROCYTE [DISTWIDTH] IN BLOOD BY AUTOMATED COUNT: 21.9 % (ref 12.3–15.4)
ERYTHROCYTE [DISTWIDTH] IN BLOOD BY AUTOMATED COUNT: 22 % (ref 12.3–15.4)
ERYTHROCYTE [DISTWIDTH] IN BLOOD BY AUTOMATED COUNT: 22.4 % (ref 12.3–15.4)
ERYTHROCYTE [DISTWIDTH] IN BLOOD BY AUTOMATED COUNT: 22.5 % (ref 12.3–15.4)
ERYTHROCYTE [DISTWIDTH] IN BLOOD BY AUTOMATED COUNT: 22.6 % (ref 12.3–15.4)
ERYTHROCYTE [DISTWIDTH] IN BLOOD BY AUTOMATED COUNT: 22.7 % (ref 12.3–15.4)
ERYTHROCYTE [DISTWIDTH] IN BLOOD BY AUTOMATED COUNT: 22.8 % (ref 12.3–15.4)
ERYTHROCYTE [DISTWIDTH] IN BLOOD BY AUTOMATED COUNT: 23.8 % (ref 12.3–15.4)
ERYTHROCYTE [DISTWIDTH] IN BLOOD BY AUTOMATED COUNT: 23.9 % (ref 12.3–15.4)
FHHB: 4.2 % (ref 0–5)
FHHB: 4.8 % (ref 0–5)
FHHB: 7.6 % (ref 0–5)
FLUAV AG NPH QL: NEGATIVE
FLUAV SUBTYP SPEC NAA+PROBE: NOT DETECTED
FLUBV AG NPH QL IA: NEGATIVE
FLUBV RNA ISLT QL NAA+PROBE: NOT DETECTED
FOLATE SERPL-MCNC: 12.6 NG/ML (ref 4.78–24.2)
FUNGUS WND CULT: ABNORMAL
GALACTOMANNAN AG SPEC IA-ACNC: 0.36 INDEX (ref 0–0.49)
GAS FLOW AIRWAY: 2 LPM
GAS FLOW AIRWAY: 4 LPM
GAS FLOW AIRWAY: 6 LPM
GFR SERPL CREATININE-BSD FRML MDRD: 36 ML/MIN/1.73
GFR SERPL CREATININE-BSD FRML MDRD: 39 ML/MIN/1.73
GFR SERPL CREATININE-BSD FRML MDRD: 41 ML/MIN/1.73
GFR SERPL CREATININE-BSD FRML MDRD: 52 ML/MIN/1.73
GFR SERPL CREATININE-BSD FRML MDRD: 64 ML/MIN/1.73
GFR SERPL CREATININE-BSD FRML MDRD: 69 ML/MIN/1.73
GFR SERPL CREATININE-BSD FRML MDRD: 73 ML/MIN/1.73
GFR SERPL CREATININE-BSD FRML MDRD: 74 ML/MIN/1.73
GFR SERPL CREATININE-BSD FRML MDRD: 79 ML/MIN/1.73
GFR SERPL CREATININE-BSD FRML MDRD: 83 ML/MIN/1.73
GFR SERPL CREATININE-BSD FRML MDRD: 87 ML/MIN/1.73
GFR SERPL CREATININE-BSD FRML MDRD: 89 ML/MIN/1.73
GFR SERPL CREATININE-BSD FRML MDRD: 89 ML/MIN/1.73
GFR SERPL CREATININE-BSD FRML MDRD: 92 ML/MIN/1.73
GFR SERPLBLD BASED ON 1.73 SQ M-ARVRAT: >60 ML/MIN/{1.73_M2}
GFR SERPLBLD BASED ON 1.73 SQ M-ARVRAT: >60 ML/MIN/{1.73_M2}
GIANT PLATELETS: NORMAL
GLOBULIN UR ELPH-MCNC: 2.2 GM/DL
GLOBULIN UR ELPH-MCNC: 2.3 GM/DL
GLOBULIN UR ELPH-MCNC: 2.5 GM/DL
GLOBULIN UR ELPH-MCNC: 2.8 G/DL (ref 2–3.5)
GLOBULIN UR ELPH-MCNC: 2.8 G/DL (ref 2–3.5)
GLOBULIN UR ELPH-MCNC: 2.9 GM/DL
GLUCOSE 24H UR-MCNC: NEGATIVE MG/DL
GLUCOSE 24H UR-MCNC: NEGATIVE MG/DL
GLUCOSE BLDA-MCNC: 139 MMOL/L (ref 70–99)
GLUCOSE BLDC GLUCOMTR-MCNC: 110 MG/DL (ref 70–99)
GLUCOSE BLDC GLUCOMTR-MCNC: 116 MG/DL (ref 70–130)
GLUCOSE BLDC GLUCOMTR-MCNC: 116 MG/DL (ref 70–130)
GLUCOSE BLDC GLUCOMTR-MCNC: 148 MG/DL (ref 70–99)
GLUCOSE SERPL-MCNC: 101 MG/DL (ref 65–99)
GLUCOSE SERPL-MCNC: 113 MG/DL (ref 65–99)
GLUCOSE SERPL-MCNC: 120 MG/DL (ref 70–99)
GLUCOSE SERPL-MCNC: 132 MG/DL (ref 65–99)
GLUCOSE SERPL-MCNC: 147 MG/DL (ref 65–99)
GLUCOSE SERPL-MCNC: 165 MG/DL (ref 65–99)
GLUCOSE SERPL-MCNC: 169 MG/DL (ref 65–99)
GLUCOSE SERPL-MCNC: 67 MG/DL (ref 65–99)
GLUCOSE SERPL-MCNC: 85 MG/DL (ref 70–99)
GLUCOSE SERPL-MCNC: 87 MG/DL (ref 65–99)
GLUCOSE SERPL-MCNC: 87 MG/DL (ref 65–99)
GLUCOSE SERPL-MCNC: 89 MG/DL (ref 65–99)
GLUCOSE SERPL-MCNC: 96 MG/DL (ref 65–99)
GLUCOSE SERPL-MCNC: 97 MG/DL (ref 65–99)
GLUCOSE SERPL-MCNC: 98 MG/DL (ref 65–99)
GLUCOSE SERPL-MCNC: 98 MG/DL (ref 65–99)
GLUCOSE UR STRIP-MCNC: NEGATIVE MG/DL
GP B STREP DNA BAL NAA+NON-PRB-NCNCRNG: NOT DETECTED
GRAM STN SPEC: NORMAL
HADV DNA SPEC NAA+PROBE: NOT DETECTED
HAEM INFLU DNA BAL NAA+NON-PRB-NCNCRNG: NOT DETECTED
HCO3 BLDA-SCNC: 31.8 MMOL/L (ref 22–26)
HCO3 BLDA-SCNC: 33.2 MMOL/L (ref 22–26)
HCO3 BLDA-SCNC: 39.3 MMOL/L (ref 22–26)
HCOV RNA LOWER RESP QL NAA+NON-PROBE: NOT DETECTED
HCT VFR BLD AUTO: 26.3 % (ref 37.5–51)
HCT VFR BLD AUTO: 28.3 % (ref 37.5–51)
HCT VFR BLD AUTO: 28.4 % (ref 37.5–51)
HCT VFR BLD AUTO: 28.6 % (ref 37.5–51)
HCT VFR BLD AUTO: 28.7 % (ref 37.5–51)
HCT VFR BLD AUTO: 29.6 % (ref 37.5–51)
HCT VFR BLD AUTO: 31.1 % (ref 37.5–51)
HCT VFR BLD AUTO: 31.2 % (ref 37.5–51)
HCT VFR BLD AUTO: 31.2 % (ref 37.5–51)
HCT VFR BLD AUTO: 31.5 % (ref 37.5–51)
HCT VFR BLD AUTO: 31.5 % (ref 37.5–51)
HCT VFR BLD AUTO: 32.8 % (ref 37.5–51)
HCT VFR BLD AUTO: 34.8 % (ref 37.5–51)
HGB BLD-MCNC: 10.1 G/DL (ref 13–17.7)
HGB BLD-MCNC: 10.5 G/DL (ref 13–17.7)
HGB BLD-MCNC: 8 G/DL (ref 13–17.7)
HGB BLD-MCNC: 8.5 G/DL (ref 13–17.7)
HGB BLD-MCNC: 8.5 G/DL (ref 13–17.7)
HGB BLD-MCNC: 8.7 G/DL (ref 13–17.7)
HGB BLD-MCNC: 8.8 G/DL (ref 13–17.7)
HGB BLD-MCNC: 8.8 G/DL (ref 13–17.7)
HGB BLD-MCNC: 9.1 G/DL (ref 13–17.7)
HGB BLD-MCNC: 9.2 G/DL (ref 13–17.7)
HGB BLD-MCNC: 9.3 G/DL (ref 13–17.7)
HGB BLD-MCNC: 9.4 G/DL (ref 13–17.7)
HGB BLD-MCNC: 9.6 G/DL (ref 13–17.7)
HGB BLDA-MCNC: 11 G/DL (ref 13.8–16.4)
HGB BLDA-MCNC: 9.7 G/DL (ref 13.8–16.4)
HGB BLDA-MCNC: 9.9 G/DL (ref 13.8–16.4)
HGB UR QL STRIP.AUTO: NEGATIVE
HGB UR QL STRIP: NEGATIVE
HGB UR QL STRIP: NEGATIVE
HMPV RNA NPH QL NAA+NON-PROBE: NOT DETECTED
HOLD SPECIMEN: NORMAL
HPIV RNA LOWER RESP QL NAA+NON-PROBE: NOT DETECTED
HSV1 DNA SPEC QL NAA+PROBE: NORMAL
HSV2 DNA SPEC QL NAA+PROBE: NORMAL
HYPOCHROMIA BLD QL: NORMAL
IMM GRANULOCYTES # BLD AUTO: 0.04 10*3/MM3 (ref 0–0.05)
IMM GRANULOCYTES # BLD AUTO: 0.05 10*3/MM3 (ref 0–0.05)
IMM GRANULOCYTES # BLD AUTO: 0.05 10*3/MM3 (ref 0–0.05)
IMM GRANULOCYTES # BLD AUTO: 0.06 10*3/MM3 (ref 0–0.05)
IMM GRANULOCYTES # BLD AUTO: 0.08 10*3/MM3 (ref 0–0.05)
IMM GRANULOCYTES # BLD AUTO: 0.14 10*3/MM3 (ref 0–0.05)
IMM GRANULOCYTES # BLD AUTO: 0.18 10*3/MM3 (ref 0–0.05)
IMM GRANULOCYTES NFR BLD AUTO: 0.3 % (ref 0–0.5)
IMM GRANULOCYTES NFR BLD AUTO: 0.4 % (ref 0–0.5)
IMM GRANULOCYTES NFR BLD AUTO: 0.7 % (ref 0–0.5)
IMM GRANULOCYTES NFR BLD AUTO: 0.8 % (ref 0–0.5)
IMM GRANULOCYTES NFR BLD AUTO: 1 % (ref 0–0.5)
IMM GRANULOCYTES NFR BLD AUTO: 1.6 % (ref 0–0.5)
INHALED O2 CONCENTRATION: 28 %
IRON 24H UR-MRATE: 15 MCG/DL (ref 59–158)
IRON SATN MFR SERPL: 6 % (ref 20–50)
K AEROGENES DNA BAL NAA+NON-PRB-NCNCRNG: NOT DETECTED
K OXYTOCA DNA BAL NAA+NON-PRB-NCNCRNG: NOT DETECTED
K PNEU GRP DNA BAL NAA+NON-PRB-NCNCRNG: NOT DETECTED
KETONES UR QL STRIP: NEGATIVE
KETONES UR QL STRIP: NEGATIVE MG/DL
KETONES UR QL STRIP: NEGATIVE MG/DL
L PNEUMO DNA LOWER RESP QL NAA+NON-PROBE: NOT DETECTED
L PNEUMO1 AG UR QL IA: NEGATIVE
L PNEUMO1 AG UR QL IA: NEGATIVE
LAB AP CASE REPORT: NORMAL
LAB AP CASE REPORT: NORMAL
LAB AP CLINICAL INFORMATION: NORMAL
LAB AP CLINICAL INFORMATION: NORMAL
LACTATE BLDA-SCNC: 0.98 MMOL/L (ref 0.5–2)
LARGE PLATELETS: NORMAL
LEUKOCYTE ESTERASE UR QL STRIP.AUTO: NEGATIVE
LYMPHOCYTES # BLD AUTO: 0.44 10*3/MM3 (ref 0.7–3.1)
LYMPHOCYTES # BLD AUTO: 0.46 10*3/MM3 (ref 0.7–3.1)
LYMPHOCYTES # BLD AUTO: 0.55 10*3/MM3 (ref 0.7–3.1)
LYMPHOCYTES # BLD AUTO: 0.58 10*3/MM3 (ref 0.7–3.1)
LYMPHOCYTES # BLD AUTO: 0.77 10*3/MM3 (ref 0.7–3.1)
LYMPHOCYTES # BLD AUTO: 0.84 10*3/MM3 (ref 0.7–3.1)
LYMPHOCYTES # BLD AUTO: 0.85 10*3/MM3 (ref 0.7–3.1)
LYMPHOCYTES # BLD AUTO: 0.86 10*3/MM3 (ref 0.7–3.1)
LYMPHOCYTES # BLD AUTO: 0.89 10*3/MM3 (ref 0.7–3.1)
LYMPHOCYTES # BLD AUTO: 0.92 10*3/MM3 (ref 0.7–3.1)
LYMPHOCYTES # BLD AUTO: 0.99 10*3/MM3 (ref 0.7–3.1)
LYMPHOCYTES NFR BLD AUTO: 2.2 % (ref 19.6–45.3)
LYMPHOCYTES NFR BLD AUTO: 4.3 % (ref 19.6–45.3)
LYMPHOCYTES NFR BLD AUTO: 4.6 % (ref 19.6–45.3)
LYMPHOCYTES NFR BLD AUTO: 6.3 % (ref 19.6–45.3)
LYMPHOCYTES NFR BLD AUTO: 7 % (ref 19.6–45.3)
LYMPHOCYTES NFR BLD AUTO: 7.2 % (ref 19.6–45.3)
LYMPHOCYTES NFR BLD AUTO: 7.2 % (ref 19.6–45.3)
LYMPHOCYTES NFR BLD AUTO: 7.5 % (ref 19.6–45.3)
LYMPHOCYTES NFR BLD AUTO: 8.5 % (ref 19.6–45.3)
LYMPHOCYTES NFR BLD AUTO: 9.8 % (ref 19.6–45.3)
LYMPHOCYTES NFR BLD AUTO: 9.8 % (ref 19.6–45.3)
M CATARRHALIS DNA BAL NAA+NON-PRB-NCNCRNG: NOT DETECTED
M PNEUMO IGG SER IA-ACNC: NOT DETECTED
MACROCYTES BLD QL SMEAR: NORMAL
MACROPHAGE FLUID: 6 %
MAGNESIUM SERPL-MCNC: 1.59 MG/DL (ref 1.6–2.3)
MAGNESIUM SERPL-MCNC: 1.7 MG/DL (ref 1.6–2.4)
MAGNESIUM SERPL-MCNC: 1.7 MG/DL (ref 1.6–2.4)
MAGNESIUM SERPL-MCNC: 1.8 MG/DL (ref 1.6–2.4)
MAGNESIUM SERPL-MCNC: 1.9 MG/DL (ref 1.6–2.4)
MAGNESIUM SERPL-MCNC: 1.92 MG/DL (ref 1.6–2.3)
MAGNESIUM SERPL-MCNC: 2.2 MG/DL (ref 1.6–2.4)
MAXIMAL PREDICTED HEART RATE: 135 BPM
MCH RBC QN AUTO: 23.2 PG (ref 26.6–33)
MCH RBC QN AUTO: 23.3 PG (ref 26.6–33)
MCH RBC QN AUTO: 23.5 PG (ref 26.6–33)
MCH RBC QN AUTO: 23.5 PG (ref 26.6–33)
MCH RBC QN AUTO: 23.6 PG (ref 26.6–33)
MCH RBC QN AUTO: 23.6 PG (ref 26.6–33)
MCH RBC QN AUTO: 23.7 PG (ref 26.6–33)
MCH RBC QN AUTO: 23.8 PG (ref 26.6–33)
MCH RBC QN AUTO: 23.8 PG (ref 26.6–33)
MCH RBC QN AUTO: 24 PG (ref 26.6–33)
MCH RBC QN AUTO: 24.1 PG (ref 26.6–33)
MCHC RBC AUTO-ENTMCNC: 29.2 G/DL (ref 31.5–35.7)
MCHC RBC AUTO-ENTMCNC: 29.5 G/DL (ref 31.5–35.7)
MCHC RBC AUTO-ENTMCNC: 29.6 G/DL (ref 31.5–35.7)
MCHC RBC AUTO-ENTMCNC: 29.6 G/DL (ref 31.5–35.7)
MCHC RBC AUTO-ENTMCNC: 29.7 G/DL (ref 31.5–35.7)
MCHC RBC AUTO-ENTMCNC: 30 G/DL (ref 31.5–35.7)
MCHC RBC AUTO-ENTMCNC: 30.1 G/DL (ref 31.5–35.7)
MCHC RBC AUTO-ENTMCNC: 30.2 G/DL (ref 31.5–35.7)
MCHC RBC AUTO-ENTMCNC: 30.4 G/DL (ref 31.5–35.7)
MCHC RBC AUTO-ENTMCNC: 30.5 G/DL (ref 31.5–35.7)
MCHC RBC AUTO-ENTMCNC: 30.6 G/DL (ref 31.5–35.7)
MCHC RBC AUTO-ENTMCNC: 30.8 G/DL (ref 31.5–35.7)
MCHC RBC AUTO-ENTMCNC: 30.8 G/DL (ref 31.5–35.7)
MCV RBC AUTO: 77.2 FL (ref 79–97)
MCV RBC AUTO: 77.5 FL (ref 79–97)
MCV RBC AUTO: 78 FL (ref 79–97)
MCV RBC AUTO: 78.1 FL (ref 79–97)
MCV RBC AUTO: 78.9 FL (ref 79–97)
MCV RBC AUTO: 78.9 FL (ref 79–97)
MCV RBC AUTO: 79 FL (ref 79–97)
MCV RBC AUTO: 79 FL (ref 79–97)
MCV RBC AUTO: 79.1 FL (ref 79–97)
MCV RBC AUTO: 79.3 FL (ref 79–97)
MCV RBC AUTO: 79.6 FL (ref 79–97)
MCV RBC AUTO: 79.7 FL (ref 79–97)
MCV RBC AUTO: 80.7 FL (ref 79–97)
MECA+MECC ISLT/SPM QL: ABNORMAL
METHGB BLD QL: 0.1 % (ref 0–1.5)
METHGB BLD QL: 0.1 % (ref 0–1.5)
METHGB BLD QL: 0.2 % (ref 0–1.5)
MICROCYTES BLD QL: NORMAL
MODALITY: ABNORMAL
MONOCYTES # BLD AUTO: 0.22 10*3/MM3 (ref 0.1–0.9)
MONOCYTES # BLD AUTO: 0.29 10*3/MM3 (ref 0.1–0.9)
MONOCYTES # BLD AUTO: 0.96 10*3/MM3 (ref 0.1–0.9)
MONOCYTES # BLD AUTO: 1.04 10*3/MM3 (ref 0.1–0.9)
MONOCYTES # BLD AUTO: 1.08 10*3/MM3 (ref 0.1–0.9)
MONOCYTES # BLD AUTO: 1.09 10*3/MM3 (ref 0.1–0.9)
MONOCYTES # BLD AUTO: 1.18 10*3/MM3 (ref 0.1–0.9)
MONOCYTES # BLD AUTO: 1.21 10*3/MM3 (ref 0.1–0.9)
MONOCYTES # BLD AUTO: 1.26 10*3/MM3 (ref 0.1–0.9)
MONOCYTES # BLD AUTO: 1.3 10*3/MM3 (ref 0.1–0.9)
MONOCYTES # BLD AUTO: 1.42 10*3/MM3 (ref 0.1–0.9)
MONOCYTES NFR BLD AUTO: 10.6 % (ref 5–12)
MONOCYTES NFR BLD AUTO: 15.4 % (ref 5–12)
MONOCYTES NFR BLD AUTO: 16.3 % (ref 5–12)
MONOCYTES NFR BLD AUTO: 2.3 % (ref 5–12)
MONOCYTES NFR BLD AUTO: 4 % (ref 5–12)
MONOCYTES NFR BLD AUTO: 4.9 % (ref 5–12)
MONOCYTES NFR BLD AUTO: 7.8 % (ref 5–12)
MONOCYTES NFR BLD AUTO: 7.9 % (ref 5–12)
MONOCYTES NFR BLD AUTO: 8.7 % (ref 5–12)
MONOCYTES NFR BLD AUTO: 9.6 % (ref 5–12)
MONOCYTES NFR BLD AUTO: 9.8 % (ref 5–12)
MRSA DNA SPEC QL NAA+PROBE: ABNORMAL
MRSA DNA SPEC QL NAA+PROBE: NORMAL
MUCOUS THREADS URNS QL MICRO: ABNORMAL
NDM GENE: NOT DETECTED
NEUTROPHILS NFR BLD AUTO: 10.06 10*3/MM3 (ref 1.7–7)
NEUTROPHILS NFR BLD AUTO: 11.53 10*3/MM3 (ref 1.7–7)
NEUTROPHILS NFR BLD AUTO: 11.71 10*3/MM3 (ref 1.7–7)
NEUTROPHILS NFR BLD AUTO: 23.43 10*3/MM3 (ref 1.7–7)
NEUTROPHILS NFR BLD AUTO: 5.72 10*3/MM3 (ref 1.7–7)
NEUTROPHILS NFR BLD AUTO: 6.24 10*3/MM3 (ref 1.7–7)
NEUTROPHILS NFR BLD AUTO: 6.47 10*3/MM3 (ref 1.7–7)
NEUTROPHILS NFR BLD AUTO: 7.67 10*3/MM3 (ref 1.7–7)
NEUTROPHILS NFR BLD AUTO: 71.6 % (ref 42.7–76)
NEUTROPHILS NFR BLD AUTO: 72.7 % (ref 42.7–76)
NEUTROPHILS NFR BLD AUTO: 78 % (ref 42.7–76)
NEUTROPHILS NFR BLD AUTO: 8.87 10*3/MM3 (ref 1.7–7)
NEUTROPHILS NFR BLD AUTO: 80.8 % (ref 42.7–76)
NEUTROPHILS NFR BLD AUTO: 80.9 % (ref 42.7–76)
NEUTROPHILS NFR BLD AUTO: 81.3 % (ref 42.7–76)
NEUTROPHILS NFR BLD AUTO: 83.6 % (ref 42.7–76)
NEUTROPHILS NFR BLD AUTO: 87.3 % (ref 42.7–76)
NEUTROPHILS NFR BLD AUTO: 88.6 % (ref 42.7–76)
NEUTROPHILS NFR BLD AUTO: 9.89 10*3/MM3 (ref 1.7–7)
NEUTROPHILS NFR BLD AUTO: 9.95 10*3/MM3 (ref 1.7–7)
NEUTROPHILS NFR BLD AUTO: 91.8 % (ref 42.7–76)
NEUTROPHILS NFR BLD AUTO: 92.6 % (ref 42.7–76)
NEUTROPHILS NFR FLD MANUAL: 94 %
NIGHT BLUE STAIN TISS: NORMAL
NITRITE UR QL STRIP: NEGATIVE
NITRITE UR-MCNC: NEGATIVE MG/ML
NITRITE UR-MCNC: NEGATIVE MG/ML
NRBC BLD AUTO-RTO: 0 /100 WBC (ref 0–0.2)
NRBC BLD AUTO-RTO: 0.2 /100 WBC (ref 0–0.2)
NT-PROBNP SERPL-MCNC: 1230 PG/ML (ref 0–1800)
NT-PROBNP SERPL-MCNC: 409.3 PG/ML (ref 0–1800)
OSMOLALITY SERPL CALC.SUM OF ELEC: 290 MOSM/KG (ref 273–304)
OSMOLALITY SERPL CALC.SUM OF ELEC: 295 MOSM/KG (ref 273–304)
OVALOCYTES BLD QL SMEAR: NORMAL
OXYHGB MFR BLDV: 91.3 % (ref 94–99)
OXYHGB MFR BLDV: 94.9 % (ref 94–99)
OXYHGB MFR BLDV: 95.5 % (ref 94–99)
P AERUGINOSA DNA BAL NAA+NON-PRB-NCNCRNG: NOT DETECTED
PATH REPORT.FINAL DX SPEC: NORMAL
PATH REPORT.FINAL DX SPEC: NORMAL
PATH REPORT.GROSS SPEC: NORMAL
PATH REPORT.GROSS SPEC: NORMAL
PCO2 BLDA: 49.8 MM HG (ref 35–45)
PCO2 BLDA: 53.2 MM HG (ref 35–45)
PCO2 BLDA: 64.4 MM HG (ref 35–45)
PH BLDA: 7.39 PH UNITS (ref 7.35–7.45)
PH BLDA: 7.4 PH UNITS (ref 7.35–7.45)
PH BLDA: 7.44 PH UNITS (ref 7.35–7.45)
PH UR STRIP.AUTO: 5.5 [PH] (ref 5–8)
PHOSPHATE SERPL-MCNC: 3.1 MG/DL (ref 2.5–4.5)
PHOSPHATE SERPL-MCNC: 3.3 MG/DL (ref 2.5–4.5)
PHOSPHATE SERPL-MCNC: 3.3 MG/DL (ref 2.5–4.5)
PLAT MORPH BLD: NORMAL
PLATELET # BLD AUTO: 185 10*3/MM3 (ref 140–450)
PLATELET # BLD AUTO: 193 10*3/MM3 (ref 140–450)
PLATELET # BLD AUTO: 196 10*3/MM3 (ref 140–450)
PLATELET # BLD AUTO: 202 10*3/MM3 (ref 140–450)
PLATELET # BLD AUTO: 202 10*3/MM3 (ref 140–450)
PLATELET # BLD AUTO: 215 10*3/MM3 (ref 140–450)
PLATELET # BLD AUTO: 231 10*3/MM3 (ref 140–450)
PLATELET # BLD AUTO: 234 10*3/MM3 (ref 140–450)
PLATELET # BLD AUTO: 237 10*3/MM3 (ref 140–450)
PLATELET # BLD AUTO: 285 10*3/MM3 (ref 140–450)
PLATELET # BLD AUTO: 289 10*3/MM3 (ref 140–450)
PLATELET # BLD AUTO: 303 10*3/MM3 (ref 140–450)
PLATELET # BLD AUTO: 309 10*3/MM3 (ref 140–450)
PMV BLD AUTO: 0 FL (ref 6–12)
PMV BLD AUTO: 10.4 FL (ref 6–12)
PMV BLD AUTO: 11.1 FL (ref 6–12)
PMV BLD AUTO: 11.3 FL (ref 6–12)
PMV BLD AUTO: 11.5 FL (ref 6–12)
PMV BLD AUTO: 11.8 FL (ref 6–12)
PMV BLD AUTO: 11.9 FL (ref 6–12)
PMV BLD AUTO: 12.3 FL (ref 6–12)
PMV BLD AUTO: 12.3 FL (ref 6–12)
PMV BLD AUTO: ABNORMAL FL
PO2 BLD: 251 MM[HG] (ref 0–500)
PO2 BLDA: 70.4 MM HG (ref 80–100)
PO2 BLDA: 83.6 MM HG (ref 80–100)
PO2 BLDA: 90.6 MM HG (ref 80–100)
POIKILOCYTOSIS BLD QL SMEAR: NORMAL
POLYCHROMASIA BLD QL SMEAR: NORMAL
POLYCHROMASIA BLD QL SMEAR: NORMAL
POTASSIUM BLDA-SCNC: 3.9 MMOL/L (ref 3.5–5)
POTASSIUM SERPL-SCNC: 3.4 MMOL/L (ref 3.5–5.2)
POTASSIUM SERPL-SCNC: 3.4 MMOL/L (ref 3.5–5.2)
POTASSIUM SERPL-SCNC: 3.5 MMOL/L (ref 3.5–5.2)
POTASSIUM SERPL-SCNC: 3.5 MMOL/L (ref 3.5–5.2)
POTASSIUM SERPL-SCNC: 3.6 MMOL/L (ref 3.5–5.2)
POTASSIUM SERPL-SCNC: 3.6 MMOL/L (ref 3.5–5.2)
POTASSIUM SERPL-SCNC: 3.7 MMOL/L (ref 3.5–5.2)
POTASSIUM SERPL-SCNC: 3.7 MMOL/L (ref 3.5–5.2)
POTASSIUM SERPL-SCNC: 3.7 MMOL/L (ref 3.5–5.3)
POTASSIUM SERPL-SCNC: 3.8 MMOL/L (ref 3.5–5.2)
POTASSIUM SERPL-SCNC: 3.8 MMOL/L (ref 3.5–5.2)
POTASSIUM SERPL-SCNC: 4 MMOL/L (ref 3.5–5.2)
POTASSIUM SERPL-SCNC: 4.1 MMOL/L (ref 3.5–5.2)
POTASSIUM SERPL-SCNC: 4.1 MMOL/L (ref 3.5–5.2)
POTASSIUM SERPL-SCNC: 4.4 MMOL/L (ref 3.5–5.2)
POTASSIUM SERPL-SCNC: 4.4 MMOL/L (ref 3.5–5.3)
PROCALCITONIN SERPL-MCNC: 0.1 NG/ML (ref 0–0.25)
PROT SERPL-MCNC: 5.4 G/DL (ref 6–8.5)
PROT SERPL-MCNC: 5.7 G/DL (ref 6–8.5)
PROT SERPL-MCNC: 5.8 G/DL (ref 6–8.5)
PROT SERPL-MCNC: 6 G/DL (ref 6–8.5)
PROT UR QL STRIP: NEGATIVE
PROT UR-MCNC: NEGATIVE MG/DL
PROT UR-MCNC: NEGATIVE MG/DL
PROTEUS SP DNA BAL NAA+NON-PRB-NCNCRNG: DETECTED
QT INTERVAL: 340 MS
QT INTERVAL: 341 MS
QT INTERVAL: 470 MS
QT INTERVAL: 481 MS
RBC # BLD AUTO: 3.33 10*6/MM3 (ref 4.14–5.8)
RBC # BLD AUTO: 3.58 10*6/MM3 (ref 4.14–5.8)
RBC # BLD AUTO: 3.6 10*6/MM3 (ref 4.14–5.8)
RBC # BLD AUTO: 3.67 10*6/MM3 (ref 4.14–5.8)
RBC # BLD AUTO: 3.68 10*6/MM3 (ref 4.14–5.8)
RBC # BLD AUTO: 3.69 10*6/MM3 (ref 4.14–5.8)
RBC # BLD AUTO: 3.92 10*6/MM3 (ref 4.14–5.8)
RBC # BLD AUTO: 3.92 10*6/MM3 (ref 4.14–5.8)
RBC # BLD AUTO: 3.95 10*6/MM3 (ref 4.14–5.8)
RBC # BLD AUTO: 3.99 10*6/MM3 (ref 4.14–5.8)
RBC # BLD AUTO: 3.99 10*6/MM3 (ref 4.14–5.8)
RBC # BLD AUTO: 4.2 10*6/MM3 (ref 4.14–5.8)
RBC # BLD AUTO: 4.46 10*6/MM3 (ref 4.14–5.8)
RBC # BLD AUTO: ABNORMAL /[HPF]
RBC MORPH BLD: NORMAL
RBC MORPH BLD: NORMAL
RHINOVIRUS RNA SPEC NAA+PROBE: NOT DETECTED
RSV RNA NPH QL NAA+NON-PROBE: NOT DETECTED
S AUREUS DNA BAL NAA+NON-PRB-NCNCRNG: NOT DETECTED
S MARCESCENS DNA BAL NAA+NON-PRB-NCNCRNG: NOT DETECTED
S PNEUM AG SPEC QL LA: NEGATIVE
S PNEUM AG SPEC QL LA: NEGATIVE
S PNEUM DNA BAL NAA+NON-PRB-NCNCRNG: NOT DETECTED
S PYO DNA BAL NAA+NON-PRB-NCNCRNG: NOT DETECTED
SAO2 % BLDCOA: 92.3 % (ref 95–99)
SAO2 % BLDCOA: 95.2 % (ref 95–99)
SAO2 % BLDCOA: 95.8 % (ref 95–99)
SARS-COV-2 RNA RESP QL NAA+PROBE: NOT DETECTED
SARS-COV-2 RNA RESP QL NAA+PROBE: NOT DETECTED
SCHISTOCYTES BLD QL SMEAR: NORMAL
SMALL PLATELETS BLD QL SMEAR: ADEQUATE
SODIUM BLDA-SCNC: 136.6 MMOL/L (ref 136–146)
SODIUM SERPL-SCNC: 134 MMOL/L (ref 136–145)
SODIUM SERPL-SCNC: 136 MMOL/L (ref 136–145)
SODIUM SERPL-SCNC: 137 MMOL/L (ref 135–147)
SODIUM SERPL-SCNC: 137 MMOL/L (ref 136–145)
SODIUM SERPL-SCNC: 139 MMOL/L (ref 136–145)
SODIUM SERPL-SCNC: 139 MMOL/L (ref 136–145)
SODIUM SERPL-SCNC: 140 MMOL/L (ref 136–145)
SODIUM SERPL-SCNC: 141 MMOL/L (ref 135–147)
SODIUM SERPL-SCNC: 141 MMOL/L (ref 136–145)
SODIUM SERPL-SCNC: 141 MMOL/L (ref 136–145)
SODIUM SERPL-SCNC: 142 MMOL/L (ref 136–145)
SODIUM SERPL-SCNC: 144 MMOL/L (ref 136–145)
SODIUM SERPL-SCNC: 145 MMOL/L (ref 136–145)
SODIUM SERPL-SCNC: 147 MMOL/L (ref 136–145)
SODIUM SERPL-SCNC: 150 MMOL/L (ref 136–145)
SODIUM SERPL-SCNC: 154 MMOL/L (ref 136–145)
SP GR UR STRIP: 1.01 (ref 1–1.03)
SP GR UR STRIP: 1.02 (ref 1–1.03)
SP GR UR STRIP: 1.02 (ref 1–1.03)
SPECIMEN SOURCE: ABNORMAL
SPECIMEN SOURCE: NORMAL
STAT OF ADQ CVX/VAG CYTO-IMP: NORMAL
STOMATOCYTES BLD QL SMEAR: NORMAL
STOMATOCYTES BLD QL SMEAR: NORMAL
STRESS TARGET HR: 115 BPM
TARGETS BLD QL SMEAR: NORMAL
TIBC SERPL-MCNC: 234 MCG/DL (ref 298–536)
TRANSFERRIN SERPL-MCNC: 157 MG/DL (ref 200–360)
TROPONIN T SERPL-MCNC: 0.03 NG/ML (ref 0–0.03)
TROPONIN T SERPL-MCNC: 0.04 NG/ML (ref 0–0.03)
TSH SERPL DL<=0.05 MIU/L-ACNC: 0.61 UIU/ML (ref 0.27–4.2)
UNIDENT CRYS URNS QL MICRO: ABNORMAL /[HPF]
UROBILINOGEN UR QL STRIP: NORMAL
VANCOMYCIN TROUGH SERPL-MCNC: 18.53 MCG/ML (ref 5–20)
VANCOMYCIN TROUGH SERPL-MCNC: 24.09 MCG/ML (ref 5–20)
VIRUS SPEC CULT: NORMAL
VISUAL PRESENCE OF BLOOD: NORMAL
VIT B12 BLD-MCNC: 1315 PG/ML (ref 211–946)
WBC # BLD AUTO: 11.95 10*3/MM3 (ref 3.4–10.8)
WBC # BLD AUTO: 12.24 10*3/MM3 (ref 3.4–10.8)
WBC # BLD AUTO: 12.24 10*3/MM3 (ref 3.4–10.8)
WBC # BLD AUTO: 12.43 10*3/MM3 (ref 3.4–10.8)
WBC # BLD AUTO: 13.41 10*3/MM3 (ref 3.4–10.8)
WBC # BLD AUTO: 13.8 10*3/MM3 (ref 3.4–10.8)
WBC # BLD AUTO: 13.98 10*3/MM3 (ref 3.4–10.8)
WBC # BLD AUTO: 25.52 10*3/MM3 (ref 3.4–10.8)
WBC # BLD AUTO: 7.3 10*3/MM3 (ref 3.4–10.8)
WBC # BLD AUTO: 7.87 10*3/MM3 (ref 3.4–10.8)
WBC # BLD AUTO: 8.71 10*3/MM3 (ref 3.4–10.8)
WBC # BLD AUTO: 9.58 10*3/MM3 (ref 3.4–10.8)
WBC # BLD AUTO: 9.83 10*3/MM3 (ref 3.4–10.8)
WBC #/AREA URNS HPF: ABNORMAL /[HPF]
WBC MORPH BLD: NORMAL
WHOLE BLOOD HOLD SPECIMEN: NORMAL
WHOLE BLOOD HOLD SPECIMEN: NORMAL

## 2021-01-01 PROCEDURE — 87496 CYTOMEG DNA AMP PROBE: CPT | Performed by: INTERNAL MEDICINE

## 2021-01-01 PROCEDURE — 85007 BL SMEAR W/DIFF WBC COUNT: CPT | Performed by: INTERNAL MEDICINE

## 2021-01-01 PROCEDURE — 94799 UNLISTED PULMONARY SVC/PX: CPT

## 2021-01-01 PROCEDURE — 70450 CT HEAD/BRAIN W/O DYE: CPT

## 2021-01-01 PROCEDURE — 80053 COMPREHEN METABOLIC PANEL: CPT | Performed by: INTERNAL MEDICINE

## 2021-01-01 PROCEDURE — 87252 VIRUS INOCULATION TISSUE: CPT | Performed by: INTERNAL MEDICINE

## 2021-01-01 PROCEDURE — 85025 COMPLETE CBC W/AUTO DIFF WBC: CPT | Performed by: EMERGENCY MEDICINE

## 2021-01-01 PROCEDURE — 93010 ELECTROCARDIOGRAM REPORT: CPT | Performed by: INTERNAL MEDICINE

## 2021-01-01 PROCEDURE — 25010000002 LORAZEPAM PER 2 MG: Performed by: PSYCHIATRY & NEUROLOGY

## 2021-01-01 PROCEDURE — 80202 ASSAY OF VANCOMYCIN: CPT | Performed by: PHYSICIAN ASSISTANT

## 2021-01-01 PROCEDURE — 83050 HGB METHEMOGLOBIN QUAN: CPT | Performed by: INTERNAL MEDICINE

## 2021-01-01 PROCEDURE — 82375 ASSAY CARBOXYHB QUANT: CPT | Performed by: INTERNAL MEDICINE

## 2021-01-01 PROCEDURE — 25010000002 CEFEPIME PER 500 MG: Performed by: PHYSICIAN ASSISTANT

## 2021-01-01 PROCEDURE — 36415 COLL VENOUS BLD VENIPUNCTURE: CPT | Performed by: INTERNAL MEDICINE

## 2021-01-01 PROCEDURE — 94660 CPAP INITIATION&MGMT: CPT

## 2021-01-01 PROCEDURE — 99232 SBSQ HOSP IP/OBS MODERATE 35: CPT | Performed by: INTERNAL MEDICINE

## 2021-01-01 PROCEDURE — 31645 BRNCHSC W/THER ASPIR 1ST: CPT | Performed by: INTERNAL MEDICINE

## 2021-01-01 PROCEDURE — 87899 AGENT NOS ASSAY W/OPTIC: CPT | Performed by: PHYSICIAN ASSISTANT

## 2021-01-01 PROCEDURE — 89051 BODY FLUID CELL COUNT: CPT | Performed by: INTERNAL MEDICINE

## 2021-01-01 PROCEDURE — 80053 COMPREHEN METABOLIC PANEL: CPT | Performed by: EMERGENCY MEDICINE

## 2021-01-01 PROCEDURE — 84484 ASSAY OF TROPONIN QUANT: CPT | Performed by: INTERNAL MEDICINE

## 2021-01-01 PROCEDURE — 80048 BASIC METABOLIC PNL TOTAL CA: CPT | Performed by: INTERNAL MEDICINE

## 2021-01-01 PROCEDURE — 25010000002 CEFEPIME 2 G/NS 100 ML SOLUTION: Performed by: INTERNAL MEDICINE

## 2021-01-01 PROCEDURE — 99222 1ST HOSP IP/OBS MODERATE 55: CPT | Performed by: SPECIALIST

## 2021-01-01 PROCEDURE — 94640 AIRWAY INHALATION TREATMENT: CPT

## 2021-01-01 PROCEDURE — 87205 SMEAR GRAM STAIN: CPT | Performed by: INTERNAL MEDICINE

## 2021-01-01 PROCEDURE — 82805 BLOOD GASES W/O2 SATURATION: CPT | Performed by: EMERGENCY MEDICINE

## 2021-01-01 PROCEDURE — 82962 GLUCOSE BLOOD TEST: CPT

## 2021-01-01 PROCEDURE — 83735 ASSAY OF MAGNESIUM: CPT | Performed by: INTERNAL MEDICINE

## 2021-01-01 PROCEDURE — 85025 COMPLETE CBC W/AUTO DIFF WBC: CPT | Performed by: INTERNAL MEDICINE

## 2021-01-01 PROCEDURE — 71250 CT THORAX DX C-: CPT

## 2021-01-01 PROCEDURE — 71045 X-RAY EXAM CHEST 1 VIEW: CPT

## 2021-01-01 PROCEDURE — 63710000001 PREDNISONE PER 1 MG: Performed by: INTERNAL MEDICINE

## 2021-01-01 PROCEDURE — 97530 THERAPEUTIC ACTIVITIES: CPT

## 2021-01-01 PROCEDURE — 25010000002 VANCOMYCIN 5 G RECONSTITUTED SOLUTION: Performed by: PHYSICIAN ASSISTANT

## 2021-01-01 PROCEDURE — 25010000002 PIPERACILLIN SOD-TAZOBACTAM PER 1 G: Performed by: INTERNAL MEDICINE

## 2021-01-01 PROCEDURE — 31628 BRONCHOSCOPY/LUNG BX EACH: CPT | Performed by: INTERNAL MEDICINE

## 2021-01-01 PROCEDURE — 74018 RADEX ABDOMEN 1 VIEW: CPT

## 2021-01-01 PROCEDURE — 88305 TISSUE EXAM BY PATHOLOGIST: CPT | Performed by: INTERNAL MEDICINE

## 2021-01-01 PROCEDURE — 99239 HOSP IP/OBS DSCHRG MGMT >30: CPT | Performed by: INTERNAL MEDICINE

## 2021-01-01 PROCEDURE — 36600 WITHDRAWAL OF ARTERIAL BLOOD: CPT | Performed by: INTERNAL MEDICINE

## 2021-01-01 PROCEDURE — 82805 BLOOD GASES W/O2 SATURATION: CPT | Performed by: INTERNAL MEDICINE

## 2021-01-01 PROCEDURE — 93005 ELECTROCARDIOGRAM TRACING: CPT | Performed by: EMERGENCY MEDICINE

## 2021-01-01 PROCEDURE — 87899 AGENT NOS ASSAY W/OPTIC: CPT | Performed by: INTERNAL MEDICINE

## 2021-01-01 PROCEDURE — 85007 BL SMEAR W/DIFF WBC COUNT: CPT | Performed by: EMERGENCY MEDICINE

## 2021-01-01 PROCEDURE — 25010000002 PROPOFOL 10 MG/ML EMULSION: Performed by: NURSE ANESTHETIST, CERTIFIED REGISTERED

## 2021-01-01 PROCEDURE — 31623 DX BRONCHOSCOPE/BRUSH: CPT | Performed by: INTERNAL MEDICINE

## 2021-01-01 PROCEDURE — 25010000002 METHYLPREDNISOLONE PER 125 MG: Performed by: INTERNAL MEDICINE

## 2021-01-01 PROCEDURE — 80048 BASIC METABOLIC PNL TOTAL CA: CPT | Performed by: PHYSICIAN ASSISTANT

## 2021-01-01 PROCEDURE — 74230 X-RAY XM SWLNG FUNCJ C+: CPT

## 2021-01-01 PROCEDURE — 70551 MRI BRAIN STEM W/O DYE: CPT

## 2021-01-01 PROCEDURE — 83605 ASSAY OF LACTIC ACID: CPT | Performed by: EMERGENCY MEDICINE

## 2021-01-01 PROCEDURE — 84466 ASSAY OF TRANSFERRIN: CPT | Performed by: INTERNAL MEDICINE

## 2021-01-01 PROCEDURE — 88108 CYTOPATH CONCENTRATE TECH: CPT | Performed by: INTERNAL MEDICINE

## 2021-01-01 PROCEDURE — 83880 ASSAY OF NATRIURETIC PEPTIDE: CPT | Performed by: EMERGENCY MEDICINE

## 2021-01-01 PROCEDURE — 99223 1ST HOSP IP/OBS HIGH 75: CPT | Performed by: INTERNAL MEDICINE

## 2021-01-01 PROCEDURE — 94669 MECHANICAL CHEST WALL OSCILL: CPT

## 2021-01-01 PROCEDURE — U0003 INFECTIOUS AGENT DETECTION BY NUCLEIC ACID (DNA OR RNA); SEVERE ACUTE RESPIRATORY SYNDROME CORONAVIRUS 2 (SARS-COV-2) (CORONAVIRUS DISEASE [COVID-19]), AMPLIFIED PROBE TECHNIQUE, MAKING USE OF HIGH THROUGHPUT TECHNOLOGIES AS DESCRIBED BY CMS-2020-01-R: HCPCS | Performed by: INTERNAL MEDICINE

## 2021-01-01 PROCEDURE — 95819 EEG AWAKE AND ASLEEP: CPT

## 2021-01-01 PROCEDURE — 25010000002 VANCOMYCIN 5 G RECONSTITUTED SOLUTION: Performed by: INTERNAL MEDICINE

## 2021-01-01 PROCEDURE — 25010000002 CEFTRIAXONE PER 250 MG: Performed by: INTERNAL MEDICINE

## 2021-01-01 PROCEDURE — 86003 ALLG SPEC IGE CRUDE XTRC EA: CPT | Performed by: INTERNAL MEDICINE

## 2021-01-01 PROCEDURE — 84484 ASSAY OF TROPONIN QUANT: CPT | Performed by: EMERGENCY MEDICINE

## 2021-01-01 PROCEDURE — 25010000002 LINEZOLID 600 MG/300ML SOLUTION: Performed by: INTERNAL MEDICINE

## 2021-01-01 PROCEDURE — 99232 SBSQ HOSP IP/OBS MODERATE 35: CPT | Performed by: SPECIALIST

## 2021-01-01 PROCEDURE — 88104 CYTOPATH FL NONGYN SMEARS: CPT | Performed by: INTERNAL MEDICINE

## 2021-01-01 PROCEDURE — 84100 ASSAY OF PHOSPHORUS: CPT | Performed by: INTERNAL MEDICINE

## 2021-01-01 PROCEDURE — 25010000002 FUROSEMIDE PER 20 MG: Performed by: INTERNAL MEDICINE

## 2021-01-01 PROCEDURE — 87102 FUNGUS ISOLATION CULTURE: CPT | Performed by: INTERNAL MEDICINE

## 2021-01-01 PROCEDURE — 87040 BLOOD CULTURE FOR BACTERIA: CPT | Performed by: EMERGENCY MEDICINE

## 2021-01-01 PROCEDURE — 99233 SBSQ HOSP IP/OBS HIGH 50: CPT | Performed by: INTERNAL MEDICINE

## 2021-01-01 PROCEDURE — 25010000002 VANCOMYCIN 5 G RECONSTITUTED SOLUTION: Performed by: EMERGENCY MEDICINE

## 2021-01-01 PROCEDURE — 81003 URINALYSIS AUTO W/O SCOPE: CPT | Performed by: EMERGENCY MEDICINE

## 2021-01-01 PROCEDURE — 83540 ASSAY OF IRON: CPT | Performed by: INTERNAL MEDICINE

## 2021-01-01 PROCEDURE — 36415 COLL VENOUS BLD VENIPUNCTURE: CPT | Performed by: PHYSICIAN ASSISTANT

## 2021-01-01 PROCEDURE — 92611 MOTION FLUOROSCOPY/SWALLOW: CPT

## 2021-01-01 PROCEDURE — 97165 OT EVAL LOW COMPLEX 30 MIN: CPT

## 2021-01-01 PROCEDURE — 83050 HGB METHEMOGLOBIN QUAN: CPT | Performed by: EMERGENCY MEDICINE

## 2021-01-01 PROCEDURE — 25010000002 CEFEPIME PER 500 MG: Performed by: INTERNAL MEDICINE

## 2021-01-01 PROCEDURE — 92610 EVALUATE SWALLOWING FUNCTION: CPT

## 2021-01-01 PROCEDURE — 70547 MR ANGIOGRAPHY NECK W/O DYE: CPT

## 2021-01-01 PROCEDURE — 93306 TTE W/DOPPLER COMPLETE: CPT

## 2021-01-01 PROCEDURE — 87641 MR-STAPH DNA AMP PROBE: CPT | Performed by: PHYSICIAN ASSISTANT

## 2021-01-01 PROCEDURE — 92526 ORAL FUNCTION THERAPY: CPT

## 2021-01-01 PROCEDURE — 36600 WITHDRAWAL OF ARTERIAL BLOOD: CPT | Performed by: EMERGENCY MEDICINE

## 2021-01-01 PROCEDURE — 87305 ASPERGILLUS AG IA: CPT | Performed by: INTERNAL MEDICINE

## 2021-01-01 PROCEDURE — 74022 RADEX COMPL AQT ABD SERIES: CPT | Performed by: RADIOLOGY

## 2021-01-01 PROCEDURE — 84145 PROCALCITONIN (PCT): CPT | Performed by: PHYSICIAN ASSISTANT

## 2021-01-01 PROCEDURE — 87449 NOS EACH ORGANISM AG IA: CPT | Performed by: INTERNAL MEDICINE

## 2021-01-01 PROCEDURE — 0B9F8ZX DRAINAGE OF RIGHT LOWER LUNG LOBE, VIA NATURAL OR ARTIFICIAL OPENING ENDOSCOPIC, DIAGNOSTIC: ICD-10-PCS | Performed by: INTERNAL MEDICINE

## 2021-01-01 PROCEDURE — 25010000002 THIAMINE PER 100 MG: Performed by: PSYCHIATRY & NEUROLOGY

## 2021-01-01 PROCEDURE — 31624 DX BRONCHOSCOPE/LAVAGE: CPT | Performed by: INTERNAL MEDICINE

## 2021-01-01 PROCEDURE — 70544 MR ANGIOGRAPHY HEAD W/O DYE: CPT

## 2021-01-01 PROCEDURE — 82607 VITAMIN B-12: CPT | Performed by: INTERNAL MEDICINE

## 2021-01-01 PROCEDURE — U0003 INFECTIOUS AGENT DETECTION BY NUCLEIC ACID (DNA OR RNA); SEVERE ACUTE RESPIRATORY SYNDROME CORONAVIRUS 2 (SARS-COV-2) (CORONAVIRUS DISEASE [COVID-19]), AMPLIFIED PROBE TECHNIQUE, MAKING USE OF HIGH THROUGHPUT TECHNOLOGIES AS DESCRIBED BY CMS-2020-01-R: HCPCS | Performed by: GENERAL PRACTICE

## 2021-01-01 PROCEDURE — 25010000002 CEFEPIME PER 500 MG: Performed by: EMERGENCY MEDICINE

## 2021-01-01 PROCEDURE — 87633 RESP VIRUS 12-25 TARGETS: CPT | Performed by: INTERNAL MEDICINE

## 2021-01-01 PROCEDURE — 25010000003 POTASSIUM CHLORIDE 10 MEQ/100ML SOLUTION: Performed by: INTERNAL MEDICINE

## 2021-01-01 PROCEDURE — 99285 EMERGENCY DEPT VISIT HI MDM: CPT

## 2021-01-01 PROCEDURE — 94668 MNPJ CHEST WALL SBSQ: CPT

## 2021-01-01 PROCEDURE — 82746 ASSAY OF FOLIC ACID SERUM: CPT | Performed by: INTERNAL MEDICINE

## 2021-01-01 PROCEDURE — 87070 CULTURE OTHR SPECIMN AEROBIC: CPT | Performed by: INTERNAL MEDICINE

## 2021-01-01 PROCEDURE — 87641 MR-STAPH DNA AMP PROBE: CPT | Performed by: INTERNAL MEDICINE

## 2021-01-01 PROCEDURE — 0BBF8ZX EXCISION OF RIGHT LOWER LUNG LOBE, VIA NATURAL OR ARTIFICIAL OPENING ENDOSCOPIC, DIAGNOSTIC: ICD-10-PCS | Performed by: INTERNAL MEDICINE

## 2021-01-01 PROCEDURE — 0BD68ZX EXTRACTION OF RIGHT LOWER LOBE BRONCHUS, VIA NATURAL OR ARTIFICIAL OPENING ENDOSCOPIC, DIAGNOSTIC: ICD-10-PCS | Performed by: INTERNAL MEDICINE

## 2021-01-01 PROCEDURE — 97110 THERAPEUTIC EXERCISES: CPT

## 2021-01-01 PROCEDURE — 87529 HSV DNA AMP PROBE: CPT | Performed by: INTERNAL MEDICINE

## 2021-01-01 PROCEDURE — 93005 ELECTROCARDIOGRAM TRACING: CPT | Performed by: INTERNAL MEDICINE

## 2021-01-01 PROCEDURE — 82375 ASSAY CARBOXYHB QUANT: CPT | Performed by: EMERGENCY MEDICINE

## 2021-01-01 PROCEDURE — 83735 ASSAY OF MAGNESIUM: CPT | Performed by: EMERGENCY MEDICINE

## 2021-01-01 PROCEDURE — U0005 INFEC AGEN DETEC AMPLI PROBE: HCPCS | Performed by: INTERNAL MEDICINE

## 2021-01-01 PROCEDURE — 84443 ASSAY THYROID STIM HORMONE: CPT | Performed by: INTERNAL MEDICINE

## 2021-01-01 PROCEDURE — 87206 SMEAR FLUORESCENT/ACID STAI: CPT | Performed by: INTERNAL MEDICINE

## 2021-01-01 PROCEDURE — 36415 COLL VENOUS BLD VENIPUNCTURE: CPT

## 2021-01-01 PROCEDURE — 87116 MYCOBACTERIA CULTURE: CPT | Performed by: INTERNAL MEDICINE

## 2021-01-01 PROCEDURE — U0005 INFEC AGEN DETEC AMPLI PROBE: HCPCS | Performed by: GENERAL PRACTICE

## 2021-01-01 PROCEDURE — 25010000002 PIPERACILLIN SOD-TAZOBACTAM PER 1 G: Performed by: EMERGENCY MEDICINE

## 2021-01-01 PROCEDURE — 87804 INFLUENZA ASSAY W/OPTIC: CPT | Performed by: INTERNAL MEDICINE

## 2021-01-01 PROCEDURE — 87071 CULTURE AEROBIC QUANT OTHER: CPT | Performed by: INTERNAL MEDICINE

## 2021-01-01 PROCEDURE — 86606 ASPERGILLUS ANTIBODY: CPT | Performed by: INTERNAL MEDICINE

## 2021-01-01 PROCEDURE — 97161 PT EVAL LOW COMPLEX 20 MIN: CPT

## 2021-01-01 RX ORDER — DIPHENOXYLATE HYDROCHLORIDE AND ATROPINE SULFATE 2.5; .025 MG/1; MG/1
1 TABLET ORAL
Status: DISCONTINUED | OUTPATIENT
Start: 2021-01-01 | End: 2021-01-01 | Stop reason: HOSPADM

## 2021-01-01 RX ORDER — GUAIFENESIN 600 MG/1
1200 TABLET, EXTENDED RELEASE ORAL 2 TIMES DAILY
COMMUNITY

## 2021-01-01 RX ORDER — LIDOCAINE HYDROCHLORIDE 40 MG/ML
INJECTION, SOLUTION RETROBULBAR; TOPICAL AS NEEDED
Status: DISCONTINUED | OUTPATIENT
Start: 2021-01-01 | End: 2021-01-01 | Stop reason: HOSPADM

## 2021-01-01 RX ORDER — SODIUM CHLORIDE, SODIUM LACTATE, POTASSIUM CHLORIDE, CALCIUM CHLORIDE 600; 310; 30; 20 MG/100ML; MG/100ML; MG/100ML; MG/100ML
30 INJECTION, SOLUTION INTRAVENOUS CONTINUOUS
Status: DISCONTINUED | OUTPATIENT
Start: 2021-01-01 | End: 2021-01-01

## 2021-01-01 RX ORDER — SACCHAROMYCES BOULARDII 250 MG
250 CAPSULE ORAL 2 TIMES DAILY
Status: DISCONTINUED | OUTPATIENT
Start: 2021-01-01 | End: 2021-01-01

## 2021-01-01 RX ORDER — LIDOCAINE HYDROCHLORIDE 20 MG/ML
INJECTION, SOLUTION INFILTRATION; PERINEURAL AS NEEDED
Status: DISCONTINUED | OUTPATIENT
Start: 2021-01-01 | End: 2021-01-01 | Stop reason: SURG

## 2021-01-01 RX ORDER — IPRATROPIUM BROMIDE AND ALBUTEROL SULFATE 2.5; .5 MG/3ML; MG/3ML
3 SOLUTION RESPIRATORY (INHALATION) EVERY 4 HOURS PRN
Status: DISCONTINUED | OUTPATIENT
Start: 2021-01-01 | End: 2021-01-01 | Stop reason: HOSPADM

## 2021-01-01 RX ORDER — IPRATROPIUM BROMIDE AND ALBUTEROL SULFATE 2.5; .5 MG/3ML; MG/3ML
3 SOLUTION RESPIRATORY (INHALATION) EVERY 4 HOURS PRN
Status: DISCONTINUED | OUTPATIENT
Start: 2021-01-01 | End: 2021-01-01 | Stop reason: SDUPTHER

## 2021-01-01 RX ORDER — GLYCOPYRROLATE 0.2 MG/ML
0.4 INJECTION INTRAMUSCULAR; INTRAVENOUS
Status: DISCONTINUED | OUTPATIENT
Start: 2021-01-01 | End: 2021-01-01 | Stop reason: HOSPADM

## 2021-01-01 RX ORDER — PANTOPRAZOLE SODIUM 40 MG/10ML
40 INJECTION, POWDER, LYOPHILIZED, FOR SOLUTION INTRAVENOUS
Status: DISCONTINUED | OUTPATIENT
Start: 2021-01-01 | End: 2021-01-01

## 2021-01-01 RX ORDER — ACETAMINOPHEN 325 MG/1
650 TABLET ORAL EVERY 4 HOURS PRN
Status: DISCONTINUED | OUTPATIENT
Start: 2021-01-01 | End: 2021-01-01 | Stop reason: HOSPADM

## 2021-01-01 RX ORDER — LORAZEPAM 2 MG/ML
1 CONCENTRATE ORAL
Status: DISCONTINUED | OUTPATIENT
Start: 2021-01-01 | End: 2021-01-01 | Stop reason: HOSPADM

## 2021-01-01 RX ORDER — CHOLECALCIFEROL (VITAMIN D3) 125 MCG
5 CAPSULE ORAL NIGHTLY PRN
Status: DISCONTINUED | OUTPATIENT
Start: 2021-01-01 | End: 2021-01-01

## 2021-01-01 RX ORDER — ACETAMINOPHEN 160 MG/5ML
650 SOLUTION ORAL EVERY 4 HOURS PRN
Status: DISCONTINUED | OUTPATIENT
Start: 2021-01-01 | End: 2021-01-01 | Stop reason: HOSPADM

## 2021-01-01 RX ORDER — ATORVASTATIN CALCIUM 40 MG/1
40 TABLET, FILM COATED ORAL NIGHTLY
Status: DISCONTINUED | OUTPATIENT
Start: 2021-01-01 | End: 2021-01-01 | Stop reason: HOSPADM

## 2021-01-01 RX ORDER — SODIUM CHLORIDE 0.9 % (FLUSH) 0.9 %
10 SYRINGE (ML) INJECTION AS NEEDED
Status: DISCONTINUED | OUTPATIENT
Start: 2021-01-01 | End: 2021-01-01 | Stop reason: HOSPADM

## 2021-01-01 RX ORDER — IPRATROPIUM BROMIDE AND ALBUTEROL SULFATE 2.5; .5 MG/3ML; MG/3ML
3 SOLUTION RESPIRATORY (INHALATION)
Status: DISCONTINUED | OUTPATIENT
Start: 2021-01-01 | End: 2021-01-01

## 2021-01-01 RX ORDER — FUROSEMIDE 10 MG/ML
40 INJECTION INTRAMUSCULAR; INTRAVENOUS EVERY 8 HOURS
Status: DISCONTINUED | OUTPATIENT
Start: 2021-01-01 | End: 2021-01-01

## 2021-01-01 RX ORDER — NITROGLYCERIN 0.4 MG/1
0.4 TABLET SUBLINGUAL
Status: DISCONTINUED | OUTPATIENT
Start: 2021-01-01 | End: 2021-01-01 | Stop reason: HOSPADM

## 2021-01-01 RX ORDER — FLUTICASONE PROPIONATE 50 MCG
2 SPRAY, SUSPENSION (ML) NASAL 2 TIMES DAILY
Status: DISCONTINUED | OUTPATIENT
Start: 2021-01-01 | End: 2021-01-01

## 2021-01-01 RX ORDER — CARVEDILOL 3.12 MG/1
3.12 TABLET ORAL 2 TIMES DAILY WITH MEALS
Status: DISCONTINUED | OUTPATIENT
Start: 2021-01-01 | End: 2021-01-01

## 2021-01-01 RX ORDER — LORAZEPAM 2 MG/ML
2 INJECTION INTRAMUSCULAR
Status: DISCONTINUED | OUTPATIENT
Start: 2021-01-01 | End: 2021-01-01 | Stop reason: HOSPADM

## 2021-01-01 RX ORDER — ALUMINA, MAGNESIA, AND SIMETHICONE 2400; 2400; 240 MG/30ML; MG/30ML; MG/30ML
15 SUSPENSION ORAL EVERY 6 HOURS PRN
Status: DISCONTINUED | OUTPATIENT
Start: 2021-01-01 | End: 2021-01-01 | Stop reason: HOSPADM

## 2021-01-01 RX ORDER — ATORVASTATIN CALCIUM 40 MG/1
40 TABLET, FILM COATED ORAL DAILY
Status: DISCONTINUED | OUTPATIENT
Start: 2021-01-01 | End: 2021-01-01

## 2021-01-01 RX ORDER — LORAZEPAM 2 MG/ML
1 INJECTION INTRAMUSCULAR
Status: DISCONTINUED | OUTPATIENT
Start: 2021-01-01 | End: 2021-01-01 | Stop reason: HOSPADM

## 2021-01-01 RX ORDER — PANTOPRAZOLE SODIUM 40 MG/1
40 TABLET, DELAYED RELEASE ORAL EVERY MORNING
Status: DISCONTINUED | OUTPATIENT
Start: 2021-01-01 | End: 2021-01-01

## 2021-01-01 RX ORDER — IPRATROPIUM BROMIDE 17 UG/1
2 AEROSOL, METERED RESPIRATORY (INHALATION) 3 TIMES DAILY
COMMUNITY
End: 2021-01-01

## 2021-01-01 RX ORDER — ARFORMOTEROL TARTRATE 15 UG/2ML
15 SOLUTION RESPIRATORY (INHALATION)
Status: DISCONTINUED | OUTPATIENT
Start: 2021-01-01 | End: 2021-01-01

## 2021-01-01 RX ORDER — POTASSIUM CHLORIDE 750 MG/1
40 CAPSULE, EXTENDED RELEASE ORAL ONCE
Status: COMPLETED | OUTPATIENT
Start: 2021-01-01 | End: 2021-01-01

## 2021-01-01 RX ORDER — GLYCOPYRROLATE 0.2 MG/ML
0.2 INJECTION INTRAMUSCULAR; INTRAVENOUS
Status: DISCONTINUED | OUTPATIENT
Start: 2021-01-01 | End: 2021-01-01 | Stop reason: HOSPADM

## 2021-01-01 RX ORDER — LORAZEPAM 2 MG/1
2 TABLET ORAL
Status: DISCONTINUED | OUTPATIENT
Start: 2021-01-01 | End: 2021-01-01 | Stop reason: HOSPADM

## 2021-01-01 RX ORDER — FUROSEMIDE 40 MG/1
40 TABLET ORAL DAILY
COMMUNITY

## 2021-01-01 RX ORDER — LORAZEPAM 2 MG/ML
0.5 INJECTION INTRAMUSCULAR
Status: DISCONTINUED | OUTPATIENT
Start: 2021-01-01 | End: 2021-01-01 | Stop reason: HOSPADM

## 2021-01-01 RX ORDER — LORAZEPAM 2 MG/ML
2 INJECTION INTRAMUSCULAR ONCE
Status: COMPLETED | OUTPATIENT
Start: 2021-01-01 | End: 2021-01-01

## 2021-01-01 RX ORDER — SODIUM CHLORIDE 0.9 % (FLUSH) 0.9 %
10 SYRINGE (ML) INJECTION EVERY 12 HOURS SCHEDULED
Status: DISCONTINUED | OUTPATIENT
Start: 2021-01-01 | End: 2021-01-01 | Stop reason: HOSPADM

## 2021-01-01 RX ORDER — CETIRIZINE HYDROCHLORIDE 10 MG/1
10 TABLET ORAL DAILY
COMMUNITY

## 2021-01-01 RX ORDER — ASPIRIN 81 MG/1
81 TABLET ORAL DAILY
Status: DISCONTINUED | OUTPATIENT
Start: 2021-01-01 | End: 2021-01-01 | Stop reason: HOSPADM

## 2021-01-01 RX ORDER — LORAZEPAM 2 MG/ML
0.5 CONCENTRATE ORAL
Status: DISCONTINUED | OUTPATIENT
Start: 2021-01-01 | End: 2021-01-01 | Stop reason: HOSPADM

## 2021-01-01 RX ORDER — PREDNISONE 20 MG/1
40 TABLET ORAL
Status: DISCONTINUED | OUTPATIENT
Start: 2021-01-01 | End: 2021-01-01

## 2021-01-01 RX ORDER — ACETAMINOPHEN 650 MG/1
650 SUPPOSITORY RECTAL EVERY 4 HOURS PRN
Status: DISCONTINUED | OUTPATIENT
Start: 2021-01-01 | End: 2021-01-01 | Stop reason: HOSPADM

## 2021-01-01 RX ORDER — LORAZEPAM 0.5 MG/1
0.5 TABLET ORAL
Status: DISCONTINUED | OUTPATIENT
Start: 2021-01-01 | End: 2021-01-01 | Stop reason: HOSPADM

## 2021-01-01 RX ORDER — FUROSEMIDE 10 MG/ML
40 INJECTION INTRAMUSCULAR; INTRAVENOUS EVERY 12 HOURS
Status: DISCONTINUED | OUTPATIENT
Start: 2021-01-01 | End: 2021-01-01

## 2021-01-01 RX ORDER — ECHINACEA PURPUREA EXTRACT 125 MG
2 TABLET ORAL 4 TIMES DAILY
Status: DISCONTINUED | OUTPATIENT
Start: 2021-01-01 | End: 2021-01-01

## 2021-01-01 RX ORDER — LINEZOLID 2 MG/ML
600 INJECTION, SOLUTION INTRAVENOUS 2 TIMES DAILY
Status: DISCONTINUED | OUTPATIENT
Start: 2021-01-01 | End: 2021-01-01

## 2021-01-01 RX ORDER — MAGNESIUM HYDROXIDE 1200 MG/15ML
LIQUID ORAL AS NEEDED
Status: DISCONTINUED | OUTPATIENT
Start: 2021-01-01 | End: 2021-01-01 | Stop reason: HOSPADM

## 2021-01-01 RX ORDER — OMEPRAZOLE 40 MG/1
40 CAPSULE, DELAYED RELEASE ORAL DAILY
COMMUNITY

## 2021-01-01 RX ORDER — ACETAMINOPHEN 325 MG/1
650 TABLET ORAL EVERY 4 HOURS PRN
Status: DISCONTINUED | OUTPATIENT
Start: 2021-01-01 | End: 2021-01-01

## 2021-01-01 RX ORDER — GINSENG 100 MG
CAPSULE ORAL EVERY 12 HOURS SCHEDULED
Status: DISCONTINUED | OUTPATIENT
Start: 2021-01-01 | End: 2021-01-01

## 2021-01-01 RX ORDER — MELATONIN
1000 DAILY
COMMUNITY

## 2021-01-01 RX ORDER — TRAZODONE HYDROCHLORIDE 50 MG/1
50 TABLET ORAL NIGHTLY
COMMUNITY
End: 2021-01-01 | Stop reason: HOSPADM

## 2021-01-01 RX ORDER — ASPIRIN 81 MG/1
81 TABLET, CHEWABLE ORAL DAILY
Status: DISCONTINUED | OUTPATIENT
Start: 2021-01-01 | End: 2021-01-01

## 2021-01-01 RX ORDER — LORAZEPAM 0.5 MG/1
1 TABLET ORAL
Status: DISCONTINUED | OUTPATIENT
Start: 2021-01-01 | End: 2021-01-01 | Stop reason: HOSPADM

## 2021-01-01 RX ORDER — IPRATROPIUM BROMIDE AND ALBUTEROL SULFATE 2.5; .5 MG/3ML; MG/3ML
3 SOLUTION RESPIRATORY (INHALATION) EVERY 4 HOURS PRN
COMMUNITY

## 2021-01-01 RX ORDER — ASPIRIN 81 MG/1
81 TABLET ORAL DAILY
Status: DISCONTINUED | OUTPATIENT
Start: 2021-01-01 | End: 2021-01-01

## 2021-01-01 RX ORDER — ALBUTEROL SULFATE 2.5 MG/3ML
2.5 SOLUTION RESPIRATORY (INHALATION) ONCE AS NEEDED
Status: COMPLETED | OUTPATIENT
Start: 2021-01-01 | End: 2021-01-01

## 2021-01-01 RX ORDER — CEFDINIR 300 MG/1
300 CAPSULE ORAL 2 TIMES DAILY
Start: 2021-01-01 | End: 2021-01-01

## 2021-01-01 RX ORDER — MORPHINE SULFATE 20 MG/ML
10 SOLUTION ORAL
Status: DISCONTINUED | OUTPATIENT
Start: 2021-01-01 | End: 2021-01-01 | Stop reason: HOSPADM

## 2021-01-01 RX ORDER — SODIUM CHLORIDE 0.9 % (FLUSH) 0.9 %
10 SYRINGE (ML) INJECTION AS NEEDED
Status: DISCONTINUED | OUTPATIENT
Start: 2021-01-01 | End: 2021-01-01

## 2021-01-01 RX ORDER — IPRATROPIUM BROMIDE AND ALBUTEROL SULFATE 2.5; .5 MG/3ML; MG/3ML
3 SOLUTION RESPIRATORY (INHALATION)
Status: DISCONTINUED | OUTPATIENT
Start: 2021-01-01 | End: 2021-01-01 | Stop reason: HOSPADM

## 2021-01-01 RX ORDER — FUROSEMIDE 10 MG/ML
60 INJECTION INTRAMUSCULAR; INTRAVENOUS ONCE
Status: COMPLETED | OUTPATIENT
Start: 2021-01-01 | End: 2021-01-01

## 2021-01-01 RX ORDER — SACCHAROMYCES BOULARDII 250 MG
250 CAPSULE ORAL 2 TIMES DAILY
COMMUNITY

## 2021-01-01 RX ORDER — BUDESONIDE 0.5 MG/2ML
0.5 INHALANT ORAL
Status: DISCONTINUED | OUTPATIENT
Start: 2021-01-01 | End: 2021-01-01

## 2021-01-01 RX ORDER — ARFORMOTEROL TARTRATE 15 UG/2ML
15 SOLUTION RESPIRATORY (INHALATION)
Status: DISCONTINUED | OUTPATIENT
Start: 2021-01-01 | End: 2021-01-01 | Stop reason: HOSPADM

## 2021-01-01 RX ORDER — ALUMINA, MAGNESIA, AND SIMETHICONE 2400; 2400; 240 MG/30ML; MG/30ML; MG/30ML
15 SUSPENSION ORAL EVERY 6 HOURS PRN
Status: DISCONTINUED | OUTPATIENT
Start: 2021-01-01 | End: 2021-01-01

## 2021-01-01 RX ORDER — BUDESONIDE 0.5 MG/2ML
0.5 INHALANT ORAL
Status: DISCONTINUED | OUTPATIENT
Start: 2021-01-01 | End: 2021-01-01 | Stop reason: HOSPADM

## 2021-01-01 RX ORDER — FERROUS SULFATE 325(65) MG
325 TABLET ORAL
Status: DISCONTINUED | OUTPATIENT
Start: 2021-01-01 | End: 2021-01-01

## 2021-01-01 RX ORDER — THIAMINE HYDROCHLORIDE 100 MG/ML
100 INJECTION, SOLUTION INTRAMUSCULAR; INTRAVENOUS EVERY 8 HOURS SCHEDULED
Status: DISCONTINUED | OUTPATIENT
Start: 2021-01-01 | End: 2021-01-01

## 2021-01-01 RX ORDER — LORAZEPAM 2 MG/ML
2 CONCENTRATE ORAL
Status: DISCONTINUED | OUTPATIENT
Start: 2021-01-01 | End: 2021-01-01 | Stop reason: HOSPADM

## 2021-01-01 RX ORDER — ATORVASTATIN CALCIUM 40 MG/1
40 TABLET, FILM COATED ORAL NIGHTLY
Status: DISCONTINUED | OUTPATIENT
Start: 2021-01-01 | End: 2021-01-01

## 2021-01-01 RX ORDER — METHYLPREDNISOLONE SODIUM SUCCINATE 125 MG/2ML
60 INJECTION, POWDER, LYOPHILIZED, FOR SOLUTION INTRAMUSCULAR; INTRAVENOUS EVERY 6 HOURS
Status: DISCONTINUED | OUTPATIENT
Start: 2021-01-01 | End: 2021-01-01

## 2021-01-01 RX ORDER — LINEZOLID 600 MG/1
600 TABLET, FILM COATED ORAL 2 TIMES DAILY
Start: 2021-01-01 | End: 2021-01-01 | Stop reason: SDUPTHER

## 2021-01-01 RX ORDER — LINEZOLID 600 MG/1
600 TABLET, FILM COATED ORAL 2 TIMES DAILY
Qty: 4 TABLET | Refills: 0
Start: 2021-01-01 | End: 2021-01-01

## 2021-01-01 RX ORDER — PANTOPRAZOLE SODIUM 40 MG/1
40 TABLET, DELAYED RELEASE ORAL DAILY
Status: ON HOLD | COMMUNITY
End: 2021-01-01

## 2021-01-01 RX ORDER — CHOLECALCIFEROL (VITAMIN D3) 125 MCG
5 CAPSULE ORAL NIGHTLY PRN
Status: DISCONTINUED | OUTPATIENT
Start: 2021-01-01 | End: 2021-01-01 | Stop reason: HOSPADM

## 2021-01-01 RX ORDER — SACCHAROMYCES BOULARDII 250 MG
250 CAPSULE ORAL 2 TIMES DAILY
Start: 2021-01-01 | End: 2021-01-01

## 2021-01-01 RX ORDER — GLYCOPYRROLATE 0.2 MG/ML
INJECTION INTRAMUSCULAR; INTRAVENOUS AS NEEDED
Status: DISCONTINUED | OUTPATIENT
Start: 2021-01-01 | End: 2021-01-01 | Stop reason: SURG

## 2021-01-01 RX ORDER — FUROSEMIDE 40 MG/1
40 TABLET ORAL DAILY
Status: DISCONTINUED | OUTPATIENT
Start: 2021-01-01 | End: 2021-01-01 | Stop reason: HOSPADM

## 2021-01-01 RX ORDER — ONDANSETRON 2 MG/ML
4 INJECTION INTRAMUSCULAR; INTRAVENOUS EVERY 4 HOURS PRN
Status: DISCONTINUED | OUTPATIENT
Start: 2021-01-01 | End: 2021-01-01 | Stop reason: HOSPADM

## 2021-01-01 RX ORDER — NITROGLYCERIN 0.4 MG/1
0.4 TABLET SUBLINGUAL
Refills: 12
Start: 2021-01-01

## 2021-01-01 RX ORDER — KETAMINE HYDROCHLORIDE 50 MG/ML
INJECTION, SOLUTION, CONCENTRATE INTRAMUSCULAR; INTRAVENOUS AS NEEDED
Status: DISCONTINUED | OUTPATIENT
Start: 2021-01-01 | End: 2021-01-01 | Stop reason: SURG

## 2021-01-01 RX ORDER — PROPOFOL 10 MG/ML
VIAL (ML) INTRAVENOUS AS NEEDED
Status: DISCONTINUED | OUTPATIENT
Start: 2021-01-01 | End: 2021-01-01 | Stop reason: SURG

## 2021-01-01 RX ORDER — CARVEDILOL 3.12 MG/1
3.12 TABLET ORAL EVERY 12 HOURS SCHEDULED
Status: DISCONTINUED | OUTPATIENT
Start: 2021-01-01 | End: 2021-01-01 | Stop reason: HOSPADM

## 2021-01-01 RX ORDER — POTASSIUM CHLORIDE 7.45 MG/ML
10 INJECTION INTRAVENOUS
Status: COMPLETED | OUTPATIENT
Start: 2021-01-01 | End: 2021-01-01

## 2021-01-01 RX ADMIN — IPRATROPIUM BROMIDE AND ALBUTEROL SULFATE 3 ML: .5; 2.5 SOLUTION RESPIRATORY (INHALATION) at 00:37

## 2021-01-01 RX ADMIN — FLUTICASONE PROPIONATE 2 SPRAY: 50 SPRAY, METERED NASAL at 11:43

## 2021-01-01 RX ADMIN — IPRATROPIUM BROMIDE AND ALBUTEROL SULFATE 3 ML: .5; 2.5 SOLUTION RESPIRATORY (INHALATION) at 07:53

## 2021-01-01 RX ADMIN — SODIUM CHLORIDE, PRESERVATIVE FREE 10 ML: 5 INJECTION INTRAVENOUS at 10:12

## 2021-01-01 RX ADMIN — CEFEPIME HYDROCHLORIDE 2 G: 2 INJECTION, POWDER, FOR SOLUTION INTRAVENOUS at 18:35

## 2021-01-01 RX ADMIN — TAZOBACTAM SODIUM AND PIPERACILLIN SODIUM 3.38 G: 375; 3 INJECTION, SOLUTION INTRAVENOUS at 17:50

## 2021-01-01 RX ADMIN — VANCOMYCIN HYDROCHLORIDE 2000 MG: 5 INJECTION, POWDER, LYOPHILIZED, FOR SOLUTION INTRAVENOUS at 07:58

## 2021-01-01 RX ADMIN — APIXABAN 5 MG: 5 TABLET, FILM COATED ORAL at 21:18

## 2021-01-01 RX ADMIN — LORAZEPAM 2 MG: 2 INJECTION INTRAMUSCULAR; INTRAVENOUS at 22:31

## 2021-01-01 RX ADMIN — ARFORMOTEROL TARTRATE 15 MCG: 15 SOLUTION RESPIRATORY (INHALATION) at 20:34

## 2021-01-01 RX ADMIN — VANCOMYCIN HYDROCHLORIDE 1000 MG: 5 INJECTION, POWDER, LYOPHILIZED, FOR SOLUTION INTRAVENOUS at 19:55

## 2021-01-01 RX ADMIN — IPRATROPIUM BROMIDE AND ALBUTEROL SULFATE 3 ML: .5; 2.5 SOLUTION RESPIRATORY (INHALATION) at 06:47

## 2021-01-01 RX ADMIN — IPRATROPIUM BROMIDE AND ALBUTEROL SULFATE 3 ML: .5; 2.5 SOLUTION RESPIRATORY (INHALATION) at 12:12

## 2021-01-01 RX ADMIN — TAZOBACTAM SODIUM AND PIPERACILLIN SODIUM 3.38 G: 375; 3 INJECTION, SOLUTION INTRAVENOUS at 14:42

## 2021-01-01 RX ADMIN — BUDESONIDE 0.5 MG: 0.5 INHALANT ORAL at 06:47

## 2021-01-01 RX ADMIN — APIXABAN 5 MG: 5 TABLET, FILM COATED ORAL at 10:10

## 2021-01-01 RX ADMIN — MICONAZOLE NITRATE: 2 POWDER TOPICAL at 21:06

## 2021-01-01 RX ADMIN — Medication: at 20:11

## 2021-01-01 RX ADMIN — ARFORMOTEROL TARTRATE 15 MCG: 15 SOLUTION RESPIRATORY (INHALATION) at 06:52

## 2021-01-01 RX ADMIN — PANTOPRAZOLE SODIUM 40 MG: 40 TABLET, DELAYED RELEASE ORAL at 10:11

## 2021-01-01 RX ADMIN — TAZOBACTAM SODIUM AND PIPERACILLIN SODIUM 3.38 G: 375; 3 INJECTION, SOLUTION INTRAVENOUS at 09:49

## 2021-01-01 RX ADMIN — METHYLPREDNISOLONE SODIUM SUCCINATE 60 MG: 125 INJECTION, POWDER, FOR SOLUTION INTRAMUSCULAR; INTRAVENOUS at 02:27

## 2021-01-01 RX ADMIN — ATORVASTATIN CALCIUM 40 MG: 40 TABLET, FILM COATED ORAL at 21:34

## 2021-01-01 RX ADMIN — LINEZOLID 600 MG: 600 INJECTION, SOLUTION INTRAVENOUS at 10:53

## 2021-01-01 RX ADMIN — SODIUM CHLORIDE, PRESERVATIVE FREE 10 ML: 5 INJECTION INTRAVENOUS at 11:40

## 2021-01-01 RX ADMIN — POTASSIUM CHLORIDE 10 MEQ: 7.46 INJECTION, SOLUTION INTRAVENOUS at 12:39

## 2021-01-01 RX ADMIN — SODIUM CHLORIDE, PRESERVATIVE FREE 10 ML: 5 INJECTION INTRAVENOUS at 08:54

## 2021-01-01 RX ADMIN — ASPIRIN 81 MG: 81 TABLET, COATED ORAL at 09:48

## 2021-01-01 RX ADMIN — Medication 1 APPLICATION: at 09:25

## 2021-01-01 RX ADMIN — MICONAZOLE NITRATE: 2 POWDER TOPICAL at 09:15

## 2021-01-01 RX ADMIN — IPRATROPIUM BROMIDE AND ALBUTEROL SULFATE 3 ML: .5; 2.5 SOLUTION RESPIRATORY (INHALATION) at 06:40

## 2021-01-01 RX ADMIN — APIXABAN 5 MG: 5 TABLET, FILM COATED ORAL at 09:19

## 2021-01-01 RX ADMIN — ASPIRIN 81 MG: 81 TABLET, CHEWABLE ORAL at 09:25

## 2021-01-01 RX ADMIN — TAZOBACTAM SODIUM AND PIPERACILLIN SODIUM 3.38 G: 375; 3 INJECTION, SOLUTION INTRAVENOUS at 02:04

## 2021-01-01 RX ADMIN — IPRATROPIUM BROMIDE AND ALBUTEROL SULFATE 3 ML: .5; 2.5 SOLUTION RESPIRATORY (INHALATION) at 01:00

## 2021-01-01 RX ADMIN — SODIUM CHLORIDE, PRESERVATIVE FREE 10 ML: 5 INJECTION INTRAVENOUS at 20:20

## 2021-01-01 RX ADMIN — IPRATROPIUM BROMIDE AND ALBUTEROL SULFATE 3 ML: .5; 2.5 SOLUTION RESPIRATORY (INHALATION) at 00:56

## 2021-01-01 RX ADMIN — IPRATROPIUM BROMIDE AND ALBUTEROL SULFATE 3 ML: .5; 2.5 SOLUTION RESPIRATORY (INHALATION) at 01:06

## 2021-01-01 RX ADMIN — FUROSEMIDE 40 MG: 10 INJECTION, SOLUTION INTRAMUSCULAR; INTRAVENOUS at 15:31

## 2021-01-01 RX ADMIN — ARFORMOTEROL TARTRATE 15 MCG: 15 SOLUTION RESPIRATORY (INHALATION) at 08:26

## 2021-01-01 RX ADMIN — ALBUTEROL SULFATE 2.5 MG: 2.5 SOLUTION RESPIRATORY (INHALATION) at 04:44

## 2021-01-01 RX ADMIN — APIXABAN 5 MG: 5 TABLET, FILM COATED ORAL at 20:11

## 2021-01-01 RX ADMIN — MORPHINE SULFATE 10 MG: 10 SOLUTION ORAL at 04:47

## 2021-01-01 RX ADMIN — CEFEPIME HYDROCHLORIDE 2 G: 2 INJECTION, POWDER, FOR SOLUTION INTRAVENOUS at 05:54

## 2021-01-01 RX ADMIN — ATORVASTATIN CALCIUM 40 MG: 40 TABLET, FILM COATED ORAL at 20:37

## 2021-01-01 RX ADMIN — APIXABAN 5 MG: 5 TABLET, FILM COATED ORAL at 21:34

## 2021-01-01 RX ADMIN — IPRATROPIUM BROMIDE AND ALBUTEROL SULFATE 3 ML: .5; 2.5 SOLUTION RESPIRATORY (INHALATION) at 12:04

## 2021-01-01 RX ADMIN — FUROSEMIDE 40 MG: 10 INJECTION, SOLUTION INTRAMUSCULAR; INTRAVENOUS at 02:55

## 2021-01-01 RX ADMIN — BUDESONIDE 0.5 MG: 0.5 INHALANT ORAL at 06:19

## 2021-01-01 RX ADMIN — FLUTICASONE PROPIONATE 2 SPRAY: 50 SPRAY, METERED NASAL at 20:31

## 2021-01-01 RX ADMIN — BUDESONIDE 0.5 MG: 0.5 INHALANT ORAL at 18:41

## 2021-01-01 RX ADMIN — LINEZOLID 600 MG: 600 INJECTION, SOLUTION INTRAVENOUS at 09:48

## 2021-01-01 RX ADMIN — FUROSEMIDE 40 MG: 10 INJECTION, SOLUTION INTRAMUSCULAR; INTRAVENOUS at 18:29

## 2021-01-01 RX ADMIN — SODIUM CHLORIDE, PRESERVATIVE FREE 10 ML: 5 INJECTION INTRAVENOUS at 09:29

## 2021-01-01 RX ADMIN — IPRATROPIUM BROMIDE AND ALBUTEROL SULFATE 3 ML: .5; 2.5 SOLUTION RESPIRATORY (INHALATION) at 08:31

## 2021-01-01 RX ADMIN — BUDESONIDE 0.5 MG: 0.5 INHALANT ORAL at 18:36

## 2021-01-01 RX ADMIN — FUROSEMIDE 40 MG: 40 TABLET ORAL at 09:14

## 2021-01-01 RX ADMIN — PREDNISONE 40 MG: 20 TABLET ORAL at 08:31

## 2021-01-01 RX ADMIN — Medication: at 20:41

## 2021-01-01 RX ADMIN — Medication: at 10:12

## 2021-01-01 RX ADMIN — METHYLPREDNISOLONE SODIUM SUCCINATE 60 MG: 125 INJECTION, POWDER, FOR SOLUTION INTRAMUSCULAR; INTRAVENOUS at 18:30

## 2021-01-01 RX ADMIN — SODIUM CHLORIDE, PRESERVATIVE FREE 10 ML: 5 INJECTION INTRAVENOUS at 20:51

## 2021-01-01 RX ADMIN — SODIUM CHLORIDE, PRESERVATIVE FREE 10 ML: 5 INJECTION INTRAVENOUS at 09:48

## 2021-01-01 RX ADMIN — APIXABAN 5 MG: 5 TABLET, FILM COATED ORAL at 22:20

## 2021-01-01 RX ADMIN — BUDESONIDE 0.5 MG: 0.5 INHALANT ORAL at 20:33

## 2021-01-01 RX ADMIN — SODIUM CHLORIDE, PRESERVATIVE FREE 10 ML: 5 INJECTION INTRAVENOUS at 09:26

## 2021-01-01 RX ADMIN — LINEZOLID 600 MG: 600 INJECTION, SOLUTION INTRAVENOUS at 21:18

## 2021-01-01 RX ADMIN — TAZOBACTAM SODIUM AND PIPERACILLIN SODIUM 3.38 G: 375; 3 INJECTION, SOLUTION INTRAVENOUS at 17:34

## 2021-01-01 RX ADMIN — TAZOBACTAM SODIUM AND PIPERACILLIN SODIUM 3.38 G: 375; 3 INJECTION, SOLUTION INTRAVENOUS at 02:01

## 2021-01-01 RX ADMIN — BUDESONIDE 0.5 MG: 0.5 INHALANT ORAL at 20:18

## 2021-01-01 RX ADMIN — ARFORMOTEROL TARTRATE 15 MCG: 15 SOLUTION RESPIRATORY (INHALATION) at 21:36

## 2021-01-01 RX ADMIN — SODIUM CHLORIDE, PRESERVATIVE FREE 10 ML: 5 INJECTION INTRAVENOUS at 21:04

## 2021-01-01 RX ADMIN — ARFORMOTEROL TARTRATE 15 MCG: 15 SOLUTION RESPIRATORY (INHALATION) at 20:33

## 2021-01-01 RX ADMIN — BUDESONIDE 0.5 MG: 0.5 INHALANT ORAL at 06:40

## 2021-01-01 RX ADMIN — SALINE NASAL SPRAY 2 SPRAY: 1.5 SOLUTION NASAL at 18:35

## 2021-01-01 RX ADMIN — BUDESONIDE 0.5 MG: 0.5 INHALANT ORAL at 22:35

## 2021-01-01 RX ADMIN — BUDESONIDE 0.5 MG: 0.5 INHALANT ORAL at 07:53

## 2021-01-01 RX ADMIN — IPRATROPIUM BROMIDE AND ALBUTEROL SULFATE 3 ML: .5; 2.5 SOLUTION RESPIRATORY (INHALATION) at 18:40

## 2021-01-01 RX ADMIN — BUDESONIDE 0.5 MG: 0.5 INHALANT ORAL at 20:34

## 2021-01-01 RX ADMIN — IPRATROPIUM BROMIDE AND ALBUTEROL SULFATE 3 ML: .5; 2.5 SOLUTION RESPIRATORY (INHALATION) at 18:36

## 2021-01-01 RX ADMIN — IPRATROPIUM BROMIDE AND ALBUTEROL SULFATE 3 ML: .5; 2.5 SOLUTION RESPIRATORY (INHALATION) at 06:52

## 2021-01-01 RX ADMIN — CARVEDILOL 3.12 MG: 3.12 TABLET, FILM COATED ORAL at 09:48

## 2021-01-01 RX ADMIN — Medication: at 21:06

## 2021-01-01 RX ADMIN — ARFORMOTEROL TARTRATE 15 MCG: 15 SOLUTION RESPIRATORY (INHALATION) at 06:44

## 2021-01-01 RX ADMIN — ARFORMOTEROL TARTRATE 15 MCG: 15 SOLUTION RESPIRATORY (INHALATION) at 08:42

## 2021-01-01 RX ADMIN — TAZOBACTAM SODIUM AND PIPERACILLIN SODIUM 3.38 G: 375; 3 INJECTION, SOLUTION INTRAVENOUS at 11:00

## 2021-01-01 RX ADMIN — CEFEPIME HYDROCHLORIDE 2 G: 2 INJECTION, POWDER, FOR SOLUTION INTRAVENOUS at 23:10

## 2021-01-01 RX ADMIN — ATORVASTATIN CALCIUM 40 MG: 40 TABLET, FILM COATED ORAL at 20:11

## 2021-01-01 RX ADMIN — PREDNISONE 40 MG: 20 TABLET ORAL at 10:10

## 2021-01-01 RX ADMIN — SALINE NASAL SPRAY 2 SPRAY: 1.5 SOLUTION NASAL at 21:33

## 2021-01-01 RX ADMIN — LORAZEPAM 0.5 MG: 2 LIQUID ORAL at 15:09

## 2021-01-01 RX ADMIN — CEFTRIAXONE 1 G: 10 INJECTION, POWDER, FOR SOLUTION INTRAVENOUS at 09:18

## 2021-01-01 RX ADMIN — CARVEDILOL 3.12 MG: 3.12 TABLET, FILM COATED ORAL at 21:27

## 2021-01-01 RX ADMIN — MICONAZOLE NITRATE: 2 POWDER TOPICAL at 20:30

## 2021-01-01 RX ADMIN — IPRATROPIUM BROMIDE AND ALBUTEROL SULFATE 3 ML: .5; 2.5 SOLUTION RESPIRATORY (INHALATION) at 13:35

## 2021-01-01 RX ADMIN — TAZOBACTAM SODIUM AND PIPERACILLIN SODIUM 3.38 G: 375; 3 INJECTION, SOLUTION INTRAVENOUS at 02:46

## 2021-01-01 RX ADMIN — TAZOBACTAM SODIUM AND PIPERACILLIN SODIUM 3.38 G: 375; 3 INJECTION, SOLUTION INTRAVENOUS at 02:44

## 2021-01-01 RX ADMIN — CEFEPIME HYDROCHLORIDE 2 G: 2 INJECTION, POWDER, FOR SOLUTION INTRAVENOUS at 18:21

## 2021-01-01 RX ADMIN — CEFEPIME HYDROCHLORIDE 2 G: 2 INJECTION, POWDER, FOR SOLUTION INTRAVENOUS at 05:48

## 2021-01-01 RX ADMIN — IPRATROPIUM BROMIDE AND ALBUTEROL SULFATE 3 ML: .5; 2.5 SOLUTION RESPIRATORY (INHALATION) at 12:14

## 2021-01-01 RX ADMIN — FUROSEMIDE 40 MG: 40 TABLET ORAL at 09:48

## 2021-01-01 RX ADMIN — SODIUM CHLORIDE, PRESERVATIVE FREE 10 ML: 5 INJECTION INTRAVENOUS at 09:28

## 2021-01-01 RX ADMIN — CEFEPIME HYDROCHLORIDE 2 G: 2 INJECTION, POWDER, FOR SOLUTION INTRAVENOUS at 06:29

## 2021-01-01 RX ADMIN — LINEZOLID 600 MG: 600 INJECTION, SOLUTION INTRAVENOUS at 22:23

## 2021-01-01 RX ADMIN — MORPHINE SULFATE 10 MG: 10 SOLUTION ORAL at 15:09

## 2021-01-01 RX ADMIN — FUROSEMIDE 40 MG: 10 INJECTION, SOLUTION INTRAMUSCULAR; INTRAVENOUS at 02:27

## 2021-01-01 RX ADMIN — IPRATROPIUM BROMIDE AND ALBUTEROL SULFATE 3 ML: .5; 2.5 SOLUTION RESPIRATORY (INHALATION) at 20:33

## 2021-01-01 RX ADMIN — TAZOBACTAM SODIUM AND PIPERACILLIN SODIUM 3.38 G: 375; 3 INJECTION, SOLUTION INTRAVENOUS at 09:13

## 2021-01-01 RX ADMIN — ARFORMOTEROL TARTRATE 15 MCG: 15 SOLUTION RESPIRATORY (INHALATION) at 22:35

## 2021-01-01 RX ADMIN — GLYCOPYRROLATE 0.2 MG: 0.2 INJECTION INTRAMUSCULAR; INTRAVENOUS at 23:08

## 2021-01-01 RX ADMIN — FUROSEMIDE 40 MG: 10 INJECTION, SOLUTION INTRAMUSCULAR; INTRAVENOUS at 09:27

## 2021-01-01 RX ADMIN — FUROSEMIDE 40 MG: 10 INJECTION, SOLUTION INTRAMUSCULAR; INTRAVENOUS at 01:51

## 2021-01-01 RX ADMIN — ATORVASTATIN CALCIUM 40 MG: 40 TABLET, FILM COATED ORAL at 22:21

## 2021-01-01 RX ADMIN — ARFORMOTEROL TARTRATE 15 MCG: 15 SOLUTION RESPIRATORY (INHALATION) at 17:25

## 2021-01-01 RX ADMIN — ARFORMOTEROL TARTRATE 15 MCG: 15 SOLUTION RESPIRATORY (INHALATION) at 07:53

## 2021-01-01 RX ADMIN — Medication: at 20:31

## 2021-01-01 RX ADMIN — BARIUM SULFATE 1 TEASPOON(S): 0.6 CREAM ORAL at 09:45

## 2021-01-01 RX ADMIN — CEFEPIME HYDROCHLORIDE 2 G: 2 INJECTION, POWDER, FOR SOLUTION INTRAVENOUS at 14:42

## 2021-01-01 RX ADMIN — BUDESONIDE 0.5 MG: 0.5 INHALANT ORAL at 08:37

## 2021-01-01 RX ADMIN — CEFEPIME HYDROCHLORIDE 2 G: 2 INJECTION, POWDER, FOR SOLUTION INTRAVENOUS at 18:22

## 2021-01-01 RX ADMIN — IPRATROPIUM BROMIDE AND ALBUTEROL SULFATE 3 ML: .5; 2.5 SOLUTION RESPIRATORY (INHALATION) at 06:25

## 2021-01-01 RX ADMIN — METHYLPREDNISOLONE SODIUM SUCCINATE 60 MG: 125 INJECTION, POWDER, FOR SOLUTION INTRAMUSCULAR; INTRAVENOUS at 21:05

## 2021-01-01 RX ADMIN — ARFORMOTEROL TARTRATE 15 MCG: 15 SOLUTION RESPIRATORY (INHALATION) at 18:36

## 2021-01-01 RX ADMIN — VANCOMYCIN HYDROCHLORIDE 1000 MG: 5 INJECTION, POWDER, LYOPHILIZED, FOR SOLUTION INTRAVENOUS at 06:20

## 2021-01-01 RX ADMIN — RDII 250 MG CAPSULE 250 MG: at 09:25

## 2021-01-01 RX ADMIN — APIXABAN 5 MG: 5 TABLET, FILM COATED ORAL at 08:31

## 2021-01-01 RX ADMIN — FUROSEMIDE 40 MG: 40 TABLET ORAL at 08:53

## 2021-01-01 RX ADMIN — CEFEPIME HYDROCHLORIDE 2 G: 2 INJECTION, POWDER, FOR SOLUTION INTRAVENOUS at 06:43

## 2021-01-01 RX ADMIN — METHYLPREDNISOLONE SODIUM SUCCINATE: 125 INJECTION, POWDER, FOR SOLUTION INTRAMUSCULAR; INTRAVENOUS at 14:42

## 2021-01-01 RX ADMIN — ARFORMOTEROL TARTRATE 15 MCG: 15 SOLUTION RESPIRATORY (INHALATION) at 06:25

## 2021-01-01 RX ADMIN — ARFORMOTEROL TARTRATE 15 MCG: 15 SOLUTION RESPIRATORY (INHALATION) at 19:20

## 2021-01-01 RX ADMIN — PANTOPRAZOLE SODIUM 40 MG: 40 TABLET, DELAYED RELEASE ORAL at 06:12

## 2021-01-01 RX ADMIN — FUROSEMIDE 40 MG: 10 INJECTION, SOLUTION INTRAMUSCULAR; INTRAVENOUS at 16:06

## 2021-01-01 RX ADMIN — POTASSIUM CHLORIDE 40 MEQ: 10 CAPSULE, COATED, EXTENDED RELEASE ORAL at 09:38

## 2021-01-01 RX ADMIN — POTASSIUM CHLORIDE 40 MEQ: 10 CAPSULE, COATED, EXTENDED RELEASE ORAL at 09:29

## 2021-01-01 RX ADMIN — CARVEDILOL 3.12 MG: 3.12 TABLET, FILM COATED ORAL at 20:20

## 2021-01-01 RX ADMIN — IPRATROPIUM BROMIDE AND ALBUTEROL SULFATE 3 ML: .5; 2.5 SOLUTION RESPIRATORY (INHALATION) at 00:24

## 2021-01-01 RX ADMIN — MICONAZOLE NITRATE: 2 POWDER TOPICAL at 21:34

## 2021-01-01 RX ADMIN — BUDESONIDE 0.5 MG: 0.5 INHALANT ORAL at 19:20

## 2021-01-01 RX ADMIN — BARIUM SULFATE 55 ML: 0.81 POWDER, FOR SUSPENSION ORAL at 09:45

## 2021-01-01 RX ADMIN — ARFORMOTEROL TARTRATE 15 MCG: 15 SOLUTION RESPIRATORY (INHALATION) at 07:40

## 2021-01-01 RX ADMIN — ATORVASTATIN CALCIUM 40 MG: 40 TABLET, FILM COATED ORAL at 21:27

## 2021-01-01 RX ADMIN — PANTOPRAZOLE SODIUM 40 MG: 40 INJECTION, POWDER, FOR SOLUTION INTRAVENOUS at 05:42

## 2021-01-01 RX ADMIN — SALINE NASAL SPRAY 2 SPRAY: 1.5 SOLUTION NASAL at 12:22

## 2021-01-01 RX ADMIN — SODIUM CHLORIDE, PRESERVATIVE FREE 10 ML: 5 INJECTION INTRAVENOUS at 21:35

## 2021-01-01 RX ADMIN — MICONAZOLE NITRATE: 2 POWDER TOPICAL at 08:32

## 2021-01-01 RX ADMIN — CEFEPIME HYDROCHLORIDE 2 G: 2 INJECTION, POWDER, FOR SOLUTION INTRAVENOUS at 06:30

## 2021-01-01 RX ADMIN — BUDESONIDE 0.5 MG: 0.5 INHALANT ORAL at 21:36

## 2021-01-01 RX ADMIN — BUDESONIDE 0.5 MG: 0.5 INHALANT ORAL at 06:25

## 2021-01-01 RX ADMIN — APIXABAN 5 MG: 5 TABLET, FILM COATED ORAL at 20:19

## 2021-01-01 RX ADMIN — IPRATROPIUM BROMIDE AND ALBUTEROL SULFATE 3 ML: .5; 2.5 SOLUTION RESPIRATORY (INHALATION) at 07:40

## 2021-01-01 RX ADMIN — TAZOBACTAM SODIUM AND PIPERACILLIN SODIUM 3.38 G: 375; 3 INJECTION, SOLUTION INTRAVENOUS at 19:10

## 2021-01-01 RX ADMIN — PREDNISONE 40 MG: 20 TABLET ORAL at 09:26

## 2021-01-01 RX ADMIN — CARVEDILOL 3.12 MG: 3.12 TABLET, FILM COATED ORAL at 22:21

## 2021-01-01 RX ADMIN — SODIUM CHLORIDE, PRESERVATIVE FREE 10 ML: 5 INJECTION INTRAVENOUS at 21:06

## 2021-01-01 RX ADMIN — SALINE NASAL SPRAY 2 SPRAY: 1.5 SOLUTION NASAL at 18:32

## 2021-01-01 RX ADMIN — FUROSEMIDE 40 MG: 10 INJECTION, SOLUTION INTRAMUSCULAR; INTRAVENOUS at 14:37

## 2021-01-01 RX ADMIN — LINEZOLID 600 MG: 600 INJECTION, SOLUTION INTRAVENOUS at 21:46

## 2021-01-01 RX ADMIN — IPRATROPIUM BROMIDE AND ALBUTEROL SULFATE 3 ML: .5; 2.5 SOLUTION RESPIRATORY (INHALATION) at 14:07

## 2021-01-01 RX ADMIN — CARVEDILOL 3.12 MG: 3.12 TABLET, FILM COATED ORAL at 08:31

## 2021-01-01 RX ADMIN — MICONAZOLE NITRATE: 2 POWDER TOPICAL at 11:38

## 2021-01-01 RX ADMIN — IPRATROPIUM BROMIDE AND ALBUTEROL SULFATE 3 ML: .5; 2.5 SOLUTION RESPIRATORY (INHALATION) at 00:43

## 2021-01-01 RX ADMIN — SODIUM CHLORIDE, PRESERVATIVE FREE 10 ML: 5 INJECTION INTRAVENOUS at 20:42

## 2021-01-01 RX ADMIN — MICONAZOLE NITRATE: 2 POWDER TOPICAL at 20:41

## 2021-01-01 RX ADMIN — FUROSEMIDE 60 MG: 10 INJECTION, SOLUTION INTRAVENOUS at 19:55

## 2021-01-01 RX ADMIN — FUROSEMIDE 40 MG: 10 INJECTION, SOLUTION INTRAMUSCULAR; INTRAVENOUS at 09:37

## 2021-01-01 RX ADMIN — PROPOFOL 20 MCG/KG/MIN: 10 INJECTION, EMULSION INTRAVENOUS at 09:23

## 2021-01-01 RX ADMIN — PANTOPRAZOLE SODIUM 40 MG: 40 INJECTION, POWDER, FOR SOLUTION INTRAVENOUS at 06:29

## 2021-01-01 RX ADMIN — BUDESONIDE 0.5 MG: 0.5 INHALANT ORAL at 08:10

## 2021-01-01 RX ADMIN — RDII 250 MG CAPSULE 250 MG: at 08:31

## 2021-01-01 RX ADMIN — TAZOBACTAM SODIUM AND PIPERACILLIN SODIUM 3.38 G: 375; 3 INJECTION, SOLUTION INTRAVENOUS at 02:00

## 2021-01-01 RX ADMIN — METHYLPREDNISOLONE SODIUM SUCCINATE 60 MG: 125 INJECTION, POWDER, FOR SOLUTION INTRAMUSCULAR; INTRAVENOUS at 09:38

## 2021-01-01 RX ADMIN — ASPIRIN 81 MG: 81 TABLET, COATED ORAL at 08:53

## 2021-01-01 RX ADMIN — ARFORMOTEROL TARTRATE 15 MCG: 15 SOLUTION RESPIRATORY (INHALATION) at 20:18

## 2021-01-01 RX ADMIN — RDII 250 MG CAPSULE 250 MG: at 10:11

## 2021-01-01 RX ADMIN — GLYCOPYRROLATE 0.2 MG: 0.2 INJECTION INTRAMUSCULAR; INTRAVENOUS at 09:24

## 2021-01-01 RX ADMIN — CEFEPIME HYDROCHLORIDE 2 G: 2 INJECTION, POWDER, FOR SOLUTION INTRAVENOUS at 06:45

## 2021-01-01 RX ADMIN — LORAZEPAM 2 MG: 2 LIQUID ORAL at 04:47

## 2021-01-01 RX ADMIN — LIDOCAINE HYDROCHLORIDE 20 MG: 20 INJECTION, SOLUTION INFILTRATION; PERINEURAL at 09:23

## 2021-01-01 RX ADMIN — TAZOBACTAM SODIUM AND PIPERACILLIN SODIUM 3.38 G: 375; 3 INJECTION, SOLUTION INTRAVENOUS at 12:41

## 2021-01-01 RX ADMIN — IPRATROPIUM BROMIDE AND ALBUTEROL SULFATE 3 ML: .5; 2.5 SOLUTION RESPIRATORY (INHALATION) at 06:44

## 2021-01-01 RX ADMIN — Medication: at 08:32

## 2021-01-01 RX ADMIN — CARVEDILOL 3.12 MG: 3.12 TABLET, FILM COATED ORAL at 10:10

## 2021-01-01 RX ADMIN — BUDESONIDE 0.5 MG: 0.5 INHALANT ORAL at 07:45

## 2021-01-01 RX ADMIN — BUDESONIDE 0.5 MG: 0.5 INHALANT ORAL at 17:28

## 2021-01-01 RX ADMIN — IPRATROPIUM BROMIDE AND ALBUTEROL SULFATE 3 ML: .5; 2.5 SOLUTION RESPIRATORY (INHALATION) at 20:34

## 2021-01-01 RX ADMIN — SODIUM CHLORIDE, PRESERVATIVE FREE 10 ML: 5 INJECTION INTRAVENOUS at 09:19

## 2021-01-01 RX ADMIN — MICONAZOLE NITRATE: 2 POWDER TOPICAL at 20:11

## 2021-01-01 RX ADMIN — SALINE NASAL SPRAY 2 SPRAY: 1.5 SOLUTION NASAL at 20:31

## 2021-01-01 RX ADMIN — SODIUM CHLORIDE, PRESERVATIVE FREE 10 ML: 5 INJECTION INTRAVENOUS at 20:33

## 2021-01-01 RX ADMIN — ARFORMOTEROL TARTRATE 15 MCG: 15 SOLUTION RESPIRATORY (INHALATION) at 06:47

## 2021-01-01 RX ADMIN — CARVEDILOL 3.12 MG: 3.12 TABLET, FILM COATED ORAL at 10:54

## 2021-01-01 RX ADMIN — VANCOMYCIN HYDROCHLORIDE 1500 MG: 5 INJECTION, POWDER, LYOPHILIZED, FOR SOLUTION INTRAVENOUS at 05:31

## 2021-01-01 RX ADMIN — CARVEDILOL 3.12 MG: 3.12 TABLET, FILM COATED ORAL at 18:22

## 2021-01-01 RX ADMIN — BUDESONIDE 0.5 MG: 0.5 INHALANT ORAL at 18:40

## 2021-01-01 RX ADMIN — VANCOMYCIN HYDROCHLORIDE 1500 MG: 5 INJECTION, POWDER, LYOPHILIZED, FOR SOLUTION INTRAVENOUS at 05:07

## 2021-01-01 RX ADMIN — ARFORMOTEROL TARTRATE 15 MCG: 15 SOLUTION RESPIRATORY (INHALATION) at 19:49

## 2021-01-01 RX ADMIN — FERROUS SULFATE TAB 325 MG (65 MG ELEMENTAL FE) 325 MG: 325 (65 FE) TAB at 08:31

## 2021-01-01 RX ADMIN — METHYLPREDNISOLONE SODIUM SUCCINATE 60 MG: 125 INJECTION, POWDER, FOR SOLUTION INTRAMUSCULAR; INTRAVENOUS at 20:41

## 2021-01-01 RX ADMIN — IPRATROPIUM BROMIDE AND ALBUTEROL SULFATE 3 ML: .5; 2.5 SOLUTION RESPIRATORY (INHALATION) at 19:20

## 2021-01-01 RX ADMIN — ASPIRIN 81 MG: 81 TABLET, COATED ORAL at 08:31

## 2021-01-01 RX ADMIN — SODIUM CHLORIDE, PRESERVATIVE FREE 10 ML: 5 INJECTION INTRAVENOUS at 20:12

## 2021-01-01 RX ADMIN — SODIUM CHLORIDE, PRESERVATIVE FREE 10 ML: 5 INJECTION INTRAVENOUS at 21:27

## 2021-01-01 RX ADMIN — IPRATROPIUM BROMIDE AND ALBUTEROL SULFATE 3 ML: .5; 2.5 SOLUTION RESPIRATORY (INHALATION) at 13:20

## 2021-01-01 RX ADMIN — IPRATROPIUM BROMIDE AND ALBUTEROL SULFATE 3 ML: .5; 2.5 SOLUTION RESPIRATORY (INHALATION) at 20:18

## 2021-01-01 RX ADMIN — IPRATROPIUM BROMIDE AND ALBUTEROL SULFATE 3 ML: .5; 2.5 SOLUTION RESPIRATORY (INHALATION) at 18:41

## 2021-01-01 RX ADMIN — SODIUM CHLORIDE, PRESERVATIVE FREE 10 ML: 5 INJECTION INTRAVENOUS at 10:53

## 2021-01-01 RX ADMIN — MICONAZOLE NITRATE: 2 POWDER TOPICAL at 10:12

## 2021-01-01 RX ADMIN — CEFEPIME HYDROCHLORIDE 2 G: 2 INJECTION, POWDER, FOR SOLUTION INTRAVENOUS at 06:26

## 2021-01-01 RX ADMIN — CEFEPIME HYDROCHLORIDE 2 G: 2 INJECTION, POWDER, FOR SOLUTION INTRAVENOUS at 18:30

## 2021-01-01 RX ADMIN — MORPHINE SULFATE 10 MG: 10 SOLUTION ORAL at 23:08

## 2021-01-01 RX ADMIN — CARVEDILOL 3.12 MG: 3.12 TABLET, FILM COATED ORAL at 09:18

## 2021-01-01 RX ADMIN — IPRATROPIUM BROMIDE AND ALBUTEROL SULFATE 3 ML: .5; 2.5 SOLUTION RESPIRATORY (INHALATION) at 11:00

## 2021-01-01 RX ADMIN — IPRATROPIUM BROMIDE AND ALBUTEROL SULFATE 3 ML: .5; 2.5 SOLUTION RESPIRATORY (INHALATION) at 17:19

## 2021-01-01 RX ADMIN — FLUTICASONE PROPIONATE 2 SPRAY: 50 SPRAY, METERED NASAL at 21:34

## 2021-01-01 RX ADMIN — CARVEDILOL 3.12 MG: 3.12 TABLET, FILM COATED ORAL at 21:18

## 2021-01-01 RX ADMIN — IPRATROPIUM BROMIDE AND ALBUTEROL SULFATE 3 ML: .5; 2.5 SOLUTION RESPIRATORY (INHALATION) at 13:02

## 2021-01-01 RX ADMIN — ARFORMOTEROL TARTRATE 15 MCG: 15 SOLUTION RESPIRATORY (INHALATION) at 08:43

## 2021-01-01 RX ADMIN — APIXABAN 5 MG: 5 TABLET, FILM COATED ORAL at 08:53

## 2021-01-01 RX ADMIN — TAZOBACTAM SODIUM AND PIPERACILLIN SODIUM 3.38 G: 375; 3 INJECTION, SOLUTION INTRAVENOUS at 03:13

## 2021-01-01 RX ADMIN — CARVEDILOL 3.12 MG: 3.12 TABLET, FILM COATED ORAL at 20:37

## 2021-01-01 RX ADMIN — THIAMINE HYDROCHLORIDE 100 MG: 100 INJECTION, SOLUTION INTRAMUSCULAR; INTRAVENOUS at 21:04

## 2021-01-01 RX ADMIN — LINEZOLID 600 MG: 600 INJECTION, SOLUTION INTRAVENOUS at 20:21

## 2021-01-01 RX ADMIN — KETAMINE HYDROCHLORIDE 20 MG: 50 INJECTION, SOLUTION INTRAMUSCULAR; INTRAVENOUS at 09:29

## 2021-01-01 RX ADMIN — BUDESONIDE 0.5 MG: 0.5 INHALANT ORAL at 06:52

## 2021-01-01 RX ADMIN — ATORVASTATIN CALCIUM 40 MG: 40 TABLET, FILM COATED ORAL at 20:19

## 2021-01-01 RX ADMIN — IPRATROPIUM BROMIDE AND ALBUTEROL SULFATE 3 ML: .5; 2.5 SOLUTION RESPIRATORY (INHALATION) at 12:57

## 2021-01-01 RX ADMIN — RDII 250 MG CAPSULE 250 MG: at 20:11

## 2021-01-01 RX ADMIN — ARFORMOTEROL TARTRATE 15 MCG: 15 SOLUTION RESPIRATORY (INHALATION) at 06:40

## 2021-01-01 RX ADMIN — APIXABAN 5 MG: 5 TABLET, FILM COATED ORAL at 09:14

## 2021-01-01 RX ADMIN — ARFORMOTEROL TARTRATE 15 MCG: 15 SOLUTION RESPIRATORY (INHALATION) at 18:40

## 2021-01-01 RX ADMIN — ARFORMOTEROL TARTRATE 15 MCG: 15 SOLUTION RESPIRATORY (INHALATION) at 18:37

## 2021-01-01 RX ADMIN — Medication: at 21:33

## 2021-01-01 RX ADMIN — METHYLPREDNISOLONE SODIUM SUCCINATE 60 MG: 125 INJECTION, POWDER, FOR SOLUTION INTRAMUSCULAR; INTRAVENOUS at 02:00

## 2021-01-01 RX ADMIN — Medication: at 09:28

## 2021-01-01 RX ADMIN — CARVEDILOL 3.12 MG: 3.12 TABLET, FILM COATED ORAL at 09:14

## 2021-01-01 RX ADMIN — PROPOFOL 30 MG: 10 INJECTION, EMULSION INTRAVENOUS at 09:27

## 2021-01-01 RX ADMIN — SODIUM CHLORIDE, PRESERVATIVE FREE 10 ML: 5 INJECTION INTRAVENOUS at 21:18

## 2021-01-01 RX ADMIN — BUDESONIDE 0.5 MG: 0.5 INHALANT ORAL at 06:44

## 2021-01-01 RX ADMIN — FLUTICASONE PROPIONATE 2 SPRAY: 50 SPRAY, METERED NASAL at 09:27

## 2021-01-01 RX ADMIN — FUROSEMIDE 40 MG: 40 TABLET ORAL at 09:17

## 2021-01-01 RX ADMIN — LINEZOLID 600 MG: 600 INJECTION, SOLUTION INTRAVENOUS at 08:54

## 2021-01-01 RX ADMIN — ARFORMOTEROL TARTRATE 15 MCG: 15 SOLUTION RESPIRATORY (INHALATION) at 08:10

## 2021-01-01 RX ADMIN — IPRATROPIUM BROMIDE AND ALBUTEROL SULFATE 3 ML: .5; 2.5 SOLUTION RESPIRATORY (INHALATION) at 19:49

## 2021-01-01 RX ADMIN — CEFEPIME HYDROCHLORIDE 2 G: 2 INJECTION, POWDER, FOR SOLUTION INTRAVENOUS at 23:38

## 2021-01-01 RX ADMIN — IPRATROPIUM BROMIDE AND ALBUTEROL SULFATE 3 ML: .5; 2.5 SOLUTION RESPIRATORY (INHALATION) at 06:19

## 2021-01-01 RX ADMIN — IPRATROPIUM BROMIDE AND ALBUTEROL SULFATE 3 ML: .5; 2.5 SOLUTION RESPIRATORY (INHALATION) at 08:26

## 2021-01-01 RX ADMIN — FERROUS SULFATE TAB 325 MG (65 MG ELEMENTAL FE) 325 MG: 325 (65 FE) TAB at 09:29

## 2021-01-01 RX ADMIN — ATORVASTATIN CALCIUM 40 MG: 40 TABLET, FILM COATED ORAL at 21:18

## 2021-01-01 RX ADMIN — CARVEDILOL 3.12 MG: 3.12 TABLET, FILM COATED ORAL at 09:24

## 2021-01-01 RX ADMIN — POTASSIUM CHLORIDE 40 MEQ: 10 CAPSULE, COATED, EXTENDED RELEASE ORAL at 10:53

## 2021-01-01 RX ADMIN — CEFEPIME HYDROCHLORIDE 2 G: 2 INJECTION, POWDER, FOR SOLUTION INTRAVENOUS at 23:29

## 2021-01-01 RX ADMIN — BUDESONIDE 0.5 MG: 0.5 INHALANT ORAL at 08:26

## 2021-01-01 RX ADMIN — LINEZOLID 600 MG: 600 INJECTION, SOLUTION INTRAVENOUS at 09:13

## 2021-01-01 RX ADMIN — SODIUM CHLORIDE, PRESERVATIVE FREE 10 ML: 5 INJECTION INTRAVENOUS at 21:46

## 2021-01-01 RX ADMIN — TAZOBACTAM SODIUM AND PIPERACILLIN SODIUM 3.38 G: 375; 3 INJECTION, SOLUTION INTRAVENOUS at 17:39

## 2021-01-01 RX ADMIN — IPRATROPIUM BROMIDE AND ALBUTEROL SULFATE 3 ML: .5; 2.5 SOLUTION RESPIRATORY (INHALATION) at 00:50

## 2021-01-01 RX ADMIN — BUDESONIDE 0.5 MG: 0.5 INHALANT ORAL at 08:42

## 2021-01-01 RX ADMIN — ASPIRIN 81 MG: 81 TABLET, COATED ORAL at 10:11

## 2021-01-01 RX ADMIN — FERROUS SULFATE TAB 325 MG (65 MG ELEMENTAL FE) 325 MG: 325 (65 FE) TAB at 10:10

## 2021-01-01 RX ADMIN — METHYLPREDNISOLONE SODIUM SUCCINATE 60 MG: 125 INJECTION, POWDER, FOR SOLUTION INTRAMUSCULAR; INTRAVENOUS at 09:27

## 2021-01-01 RX ADMIN — IPRATROPIUM BROMIDE AND ALBUTEROL SULFATE 3 ML: .5; 2.5 SOLUTION RESPIRATORY (INHALATION) at 13:00

## 2021-01-01 RX ADMIN — APIXABAN 5 MG: 5 TABLET, FILM COATED ORAL at 09:25

## 2021-01-01 RX ADMIN — IPRATROPIUM BROMIDE AND ALBUTEROL SULFATE 3 ML: .5; 2.5 SOLUTION RESPIRATORY (INHALATION) at 00:47

## 2021-01-01 RX ADMIN — SODIUM CHLORIDE, PRESERVATIVE FREE 10 ML: 5 INJECTION INTRAVENOUS at 22:25

## 2021-01-01 RX ADMIN — TAZOBACTAM SODIUM AND PIPERACILLIN SODIUM 3.38 G: 375; 3 INJECTION, SOLUTION INTRAVENOUS at 17:55

## 2021-01-01 RX ADMIN — ASPIRIN 81 MG: 81 TABLET, COATED ORAL at 09:13

## 2021-01-01 RX ADMIN — ARFORMOTEROL TARTRATE 15 MCG: 15 SOLUTION RESPIRATORY (INHALATION) at 06:19

## 2021-01-01 RX ADMIN — POTASSIUM CHLORIDE 10 MEQ: 7.46 INJECTION, SOLUTION INTRAVENOUS at 11:31

## 2021-01-01 RX ADMIN — RDII 250 MG CAPSULE 250 MG: at 21:34

## 2021-01-01 RX ADMIN — ASPIRIN 81 MG: 81 TABLET, COATED ORAL at 10:54

## 2021-01-01 RX ADMIN — SODIUM CHLORIDE, POTASSIUM CHLORIDE, SODIUM LACTATE AND CALCIUM CHLORIDE: 600; 310; 30; 20 INJECTION, SOLUTION INTRAVENOUS at 09:23

## 2021-01-01 RX ADMIN — FUROSEMIDE 40 MG: 40 TABLET ORAL at 10:54

## 2021-01-01 RX ADMIN — BARIUM SULFATE 50 ML: 400 SUSPENSION ORAL at 09:45

## 2021-01-01 RX ADMIN — SODIUM CHLORIDE, PRESERVATIVE FREE 10 ML: 5 INJECTION INTRAVENOUS at 09:38

## 2021-01-01 RX ADMIN — SODIUM CHLORIDE, PRESERVATIVE FREE 10 ML: 5 INJECTION INTRAVENOUS at 20:00

## 2021-01-01 RX ADMIN — ASPIRIN 81 MG: 81 TABLET, COATED ORAL at 09:18

## 2021-01-01 RX ADMIN — BUDESONIDE 0.5 MG: 0.5 INHALANT ORAL at 19:49

## 2021-01-01 RX ADMIN — TAZOBACTAM SODIUM AND PIPERACILLIN SODIUM 3.38 G: 375; 3 INJECTION, SOLUTION INTRAVENOUS at 17:38

## 2021-01-01 RX ADMIN — CARVEDILOL 3.12 MG: 3.12 TABLET, FILM COATED ORAL at 08:53

## 2021-01-01 RX ADMIN — SODIUM CHLORIDE, PRESERVATIVE FREE 10 ML: 5 INJECTION INTRAVENOUS at 09:14

## 2021-05-11 NOTE — PROCEDURES
"   Procedure Note      Patient Name: Alexander Rouse   Patient ID: [id number]   Sex: Male   YOB: 1935    Primary Care Provider: Xavier Vazquez MD   Referring Provider: Xavier Vazquez MD    Visit Date: April 21, 2021    Provider: Mitch Montelongo MD   Location: [facility]   Location Address: [address] [place] [address] [facility]  [address]  [place], [place]  [id number]   Location Phone: [phone]          FINAL REPORT   TRANSTHORACIC ECHOCARDIOGRAM REPORT    Diagnosis: CHF, CAD   Height: 5'9\" Weight: 214 B/P: 132/68 BSA: 2.12   Tech: JLW   MEASUREMENTS:  RVID (Diastole) : RVID. (NORMAL: 0.7 to 2.4 cm max)   LVID (Systole): 2.9 cm (Diastole): 4 cm . (NORMAL: 3.7 - 5.4 cm)   Posterior Wall Thickness (Diastole): 1 cm. (NORMAL: 0.8 - 1.1 cm)   Septal Thickness (Diastole): 1.1 cm. (NORMAL: 0.7 - 1.2 cm)   LAID (Systole): LEFT ATRIUM MEASUREMENT (CM) cm. (NORMAL: 1.9 - 3.8 cm)   Aortic Root Diameter (Diastole): AORTIC MEASUREMENT (CM) cm. (NORMAL: 2.0 - 3.7 cm)   COMMENTS:  The patient underwent 2-D, M-Mode, and Doppler examination, including pulse-wave, continuous-wave, and color-flow analysis. The study is technically difficult due to poor acoustic windows. The following findings were noted.   FINDINGS:  MITRAL VALVE: Grossly normal. Trace mitral regurgitation noted.   AORTIC VALVE: Valve leaflets are not well visualized. No hemodynamically significant aortic stenosis or regurgitation noted.   TRICUSPID VALVE: Grossly normal. There is trace tricuspid regurgitation, unable to calculate pulmonary artery systolic pressure due to incomplete TR Doppler envelope.   PULMONIC VALVE: Not well visualized.   AORTIC ROOT: Normal in size with adequate motion.   LEFT ATRIUM: Normal in size. No intracavitary masses or clots seen.   LEFT VENTRICLE: The left ventricular chamber size and wall thickness is normal. Left ventricular systolic function is grossly normal. Unable to calculate accurate ejection fraction due to incomplete " endocardial definition. Approximate LV ejection fraction is 50%. Cannot rule out regional wall motion abnormalities. There is grade 1 diastolic dysfunction, impaired relaxation of the left ventricle.   RIGHT VENTRICLE: Normal size and function.   RIGHT ATRIUM: Grossly normal.   PERICARDIUM: No evidence of pericardial effusion.   INFERIOR VENA CAVA: Measures 1.3 cm in diameter and there is more than 50% collapse during inspiration.   DOPPLER: E/A ratio is 0.6.DT= 198 msec. IVRT is 99 msec. E/E' is 9.   Faxed: 04/24/2021      CONCLUSION:  1.  Technically difficult study.   2.  Grossly normal left ventricular systolic function with approximate LV ejection fraction of 50%.   3.  Grade 1 diastolic dysfunction of the left ventricle.   4.  There are no hemodynamically significant valvular abnormalities.     Mitch Montelongo MD   JV/rt                    Electronically Signed by: Josy Gary-, Other -Author on April 24, 2021 02:44:07 PM  Electronically Co-signed by: Mitch Montelongo MD -Reviewer on April 24, 2021 02:50:38 PM

## 2021-05-13 NOTE — PROGRESS NOTES
Progress Note      Patient Name: Alexander Rouse   Patient ID: [id number]   Sex: Male   YOB: 1935    Primary Care Provider: Xavier Vazquez MD   Referring Provider: Xavier Vazquez MD    Visit Date: October 13, 2020    Provider: Mitch Montelongo MD   Location: [facility] [place]   Location Address: [address] [place], [place] [address]  [address]  [place], [place]  [id number]   Location Phone: [phone]          Chief Complaint  · Followup visit for coronary artery disease and congestive heart failure.      History Of Present Illness  REFERRING CARE PROVIDER: Xavier Vazquez MD   Alexander Rouse is an 85 year old /White male with coronary artery disease with previous angioplasty, hyperlipidemia, chronic edema, and previous pulmonary embolism who is here for a followup visit. Patient was previously seen on 08/18/2020, and no medication changes were made. A month later he was started on Lasix because of progressive weight gain and increasing shortness of breath. The dose of Lasix was later increased to 40 mg daily. Today, patient is accompanied by a family member, who reports that the swelling and shortness of breath are much better after starting 40 mg of Lasix. His weight has been stable. He denies any chest pain, palpitations, or any other complaints.   PAST MEDICAL HISTORY: 1) Coronary artery disease, previous myocardial infarction, angioplasty and stent placement to the left circumflex artery in 2008; 2) Hypotension; 3) LV ejection fraction is 50% per echocardiogram in 2016; 4) Hyperlipidemia; 5) History of pulmonary embolism, on long-term anticoagulation; 6) Chronic obstructive pulmonary disease; 7) History of lung cancer status post lung resection more than 10 years back.   PSYCHOSOCIAL HISTORY: Current smoker of half-a-pack per day. Denies alcohol use.   CURRENT MEDICATIONS: Eliquis 5 mg b.i.d.; atorvastatin 40 mg q. p.m.; carvedilol 3.125 mg b.i.d.; hydrochlorothiazide 12.5 mg every other day; furosemide 40  "mg q. a.m.; aspirin 81 mg daily; montelukast 10 mg daily; omeprazole 40 mg daily; selenium 200 mcg daily; Mucinex 1200 mg b.i.d.; vitamin D3 1000 units daily; vitamin B12 1.0 mL sublingual daily; albuterol; Dulera.       Review of Systems  · Cardiovascular  o Admits  o : swelling (feet, ankles, hands)  o Denies  o : palpitations (fast, fluttering, or skipping beats), shortness of breath while walking or lying flat, chest pain or angina pectoris   · Respiratory  o Admits  o : chronic or frequent cough      Vitals  Date Time BP Position Site L\R Cuff Size HR RR TEMP (F) WT  HT  BMI kg/m2 BSA m2 O2 Sat FR L/min FiO2 HC       10/13/2020 12:06 /56 Sitting    81 - R   207lbs 0oz 5'  9\" 30.57 2.14             Physical Examination  · Respiratory  o Auscultation of Lungs  o : Bilateral extensive wheezing heard. No crackles.   · Cardiovascular  o Heart  o : S1, S2 normally heard. No S3. No murmur, rubs, or gallops.  · Gastrointestinal  o Abdominal Examination  o : Soft, nontender, nondistended. No free fluid. Bowel sounds heard in all four quadrants.  · Extremities  o Extremities  o : 1+ pitting pedal edema bilaterally. Distal pulses present.   · Labs  o Labs  o : 10/12/2020, sodium 140, potassium 3.9, chloride 98, BUN 27, creatinine 1.09, glucose 109, calcium 9.2.          Assessment     ASSESSMENT & PLAN:     1.  Shortness of breath/pedal edema.  Patient has a component of diastolic heart failure.  Volume status is        better with the higher dose of Lasix.  Recent labs showed normal electrolytes and renal function.  Will        discontinue hydrochlorothiazide permanently and continue Lasix 40 mg p.o. daily at this time.  As planned        before, will do a repeat echocardiogram next year.    2.  Coronary artery disease.  Previous angioplasty and myocardial infarction, stable with no angina.  Continue        aspirin, statin, and beta-blocker.   3.  Hypertension.  Well controlled.  4.  History of pulmonary embolism.  " Patient is on long-term Eliquis.  5.  Follow up in 3 months with repeat labs.        Mitch Montelongo MD  JV:vm             Electronically Signed by: Heather Wills-, Other -Author on October 14, 2020 02:32:45 PM  Electronically Co-signed by: iMtch Montelongo MD -Reviewer on October 14, 2020 02:35:06 PM

## 2021-05-13 NOTE — PROGRESS NOTES
Progress Note      Patient Name: Alexander Rouse   Patient ID: [id number]   Sex: Male   YOB: 1935    Primary Care Provider: Xavier Vazquez MD   Referring Provider: Xavier Vazquez MD    Visit Date: August 18, 2020    Provider: Mitch Montelongo MD   Location: [facility]   Location Address: [address] [place] [address] [facility]  [address]  [place], [place]  [id number]   Location Phone: [phone]          Chief Complaint     Followup visit for coronary artery disease.       History Of Present Illness  REFERRING CARE PROVIDER: Xavier Vazquez MD   Alexander Rouse is an 84 year old /White male with coronary artery disease with previous angioplasty, hyperlipidemia, chronic pedal edema, and previous pulmonary embolism who is here for a followup visit. Today, he reports feeling fine. Shortness of breath is at his baseline. No chest pain or palpitations. Overall feeling fine and at his baseline.   PAST MEDICAL HISTORY: 1) Coronary artery disease, previous myocardial infarction, angioplasty and stent placement to the left circumflex artery in 2008; 2) Hypotension; 3) LV ejection fraction is 50% per echocardiogram in 2016; 4) Hyperlipidemia; 5) History of pulmonary embolism, on long-term anticoagulation; 6) Chronic obstructive pulmonary disease; 7) History of lung cancer status post lung resection more than 10 years back.   PSYCHOSOCIAL HISTORY: Current smoker of one-fourth pack per day. Denies alcohol use. Denies mood changes or depression.   CURRENT MEDICATIONS: Eliquis 5 mg b.i.d.; atorvastatin 40 mg daily; montelukast 10 mg daily; carvedilol 3.125 mg daily; omeprazole 40 mg daily; hydrochlorothiazide 12.5 mg daily; aspirin 81 mg daily; selenium 200 mcg daily; Mucinex 1200 mg b.i.d.; vitamin D3 1000 units daily; vitamin B12 liquid 1.0 mL daily; Albuterol; Dulera.       Review of Systems  · Cardiovascular  o Admits  o : swelling (feet, ankles, hands), shortness of breath while walking or lying  "flat  o Denies  o : palpitations (fast, fluttering, or skipping beats), chest pain or angina pectoris   · Respiratory  o Admits  o : chronic or frequent cough, asthma or wheezing      Vitals  Date Time BP Position Site L\R Cuff Size HR RR TEMP (F) WT  HT  BMI kg/m2 BSA m2 O2 Sat HC       08/18/2020 11:01 /61 Sitting    87 - R   207lbs 0oz 5'  9\" 30.57 2.14           Physical Examination  · Respiratory  o Auscultation of Lungs  o : Bilateral extensive wheezing heard. No crackles.   · Cardiovascular  o Heart  o : S1, S2 normally heard. No S3. No murmur, rubs, or gallops.  · Gastrointestinal  o Abdominal Examination  o : Soft, nontender, nondistended. No free fluid. Bowel sounds heard in all four quadrants.  · Extremities  o Extremities  o : 1+ pitting pedal edema bilaterally. Distal pulses present.   · Labs  o Labs  o : 08/12/2020, BUN 34, creatinine 1.44, sodium 141, potassium 4.4, chloride 100, bicarb 24, total protein 6.4, albumin 3.5, globulin 2.9, AST 27, ALT 20, triglycerides 176, total cholesterol 126, HDL 52, LDL 59.          Assessment     ASSESSMENT & PLAN:    1.  Coronary artery disease: Previous angioplasty, stable with no angina.  Continue aspirin, statin, and        beta-blocker at the current dose.    2.  Hyperlipidemia.  LDL 59, at goal.  Continue atorvastatin at the current dose.  3.  Acute renal failure.  Creatinine 1.4 today, previously normal and 1.2 per labs done in May.  Will decrease        the dose of hydrochlorothiazide to 12.5 mg every other day.  Encouraged adequate hydration.  Recheck        BMP in a month.    4.  Follow up in 6 months.        MD CELESTE Sharp:arlin                     Electronically Signed by: Mitch Montelongo MD -Author on August 22, 2020 12:01:52 PM  "

## 2021-05-14 NOTE — PROGRESS NOTES
Progress Note      Patient Name: Alexander Rouse   Patient ID: [id number]   Sex: Male   YOB: 1935    Primary Care Provider: Xavier Vazquez MD   Referring Provider: Xavier Vazquez MD    Visit Date: January 13, 2021    Provider: Mitch Montelongo MD   [facility]   Location Address: [address] [place] [address] [facility]  [address]  [place], [place]  [id number]   Location Phone: [phone]          Chief Complaint  · Coronary artery disease   · Congestive heart failure       History Of Present Illness  REFERRING CARE PROVIDER: Xavier Vazquez MD   Alexander Rouse is an 85-year-old male with coronary artery disease, previous angioplasty, hyperlipidemia, chronic edema, and previous pulmonary embolism who is here for a followup visit. He was last seen in the office in October and at that time, hydrochlorothiazide was discontinued and he was put on daily Lasix. The patient did fine for the next two months, however, gained more than 10 pounds over the week during Christmastime. Per the grandson, he took an additional dose of Lasix which helped. His weight back to his prior weight at this time. He feels fine, denies major shortness of breath or orthopnea. Pedal edema is still present. Denies chest pain or palpitations.   PAST MEDICAL HISTORY: 1) Coronary artery disease, previous myocardial infarction, angioplasty and stent placement to the left circumflex artery in 2008; 2) Hypotension; 3) LV ejection fraction is 50% per echocardiogram in 2016; 4) Hyperlipidemia; 5) History of pulmonary embolism, on long-term anticoagulation; 6) Chronic obstructive pulmonary disease; 7) History of lung cancer status post lung resection more than 10 years back.   PSYCHOSOCIAL HISTORY: Denies alcohol or tobacco use.   CURRENT MEDICATIONS: Eliquis 5 mg b.i.d.; Atorvastatin 40 mg daily; Montelukast 10 mg daily; Carvedilol 3.125 mg daily; Omeprazole 40 mg daily; Furosemide 40 mg daily; aspirin 81 mg daily; Selenium 200 mg daily; Mucinex;  "vitamin D3; vitamin B12; Albuterol.      ALLERGIES:  No known drug allergies.       Review of Systems  · Cardiovascular  o Admits  o : swelling (feet, ankles, hands)  o Denies  o : palpitations (fast, fluttering, or skipping beats), shortness of breath while walking or lying flat, chest pain or angina pectoris   · Respiratory  o Admits  o : chronic or frequent cough      Vitals  Date Time BP Position Site L\R Cuff Size HR RR TEMP (F) WT  HT  BMI kg/m2 BSA m2 O2 Sat FR L/min FiO2 HC       01/13/2021 09:51 /68 Sitting    90 - R   213lbs 16oz 5'  9\" 31.6 2.17             Physical Examination  · Respiratory  o Auscultation of Lungs  o : Clear to auscultation bilaterally. No crackles or rhonchi.  · Cardiovascular  o Heart  o : S1, S2 is normally heard. No S3. No murmur, rubs, or gallops.  · Gastrointestinal  o Abdominal Examination  o : Soft, nontender, nondistended. No free fluid. Bowel sounds heard in all four quadrants.  · Extremities  o Extremities  o : 1+ pitting pedal edema bilaterally. Distal pulses present.  · Labs  o Labs  o : Labs done on 01/11/2021 shows BUN 27, creatinine 1.06, sodium 137, potassium 4.4, total protein 6.5, albumin 3.7, globulin 2.8, calcium 9.2, magnesium 1.92, AST 15, ALT 13, bilirubin 0.34.          Assessment     ASSESSMENT AND PLAN:  1.  Chronic diastolic heart failure/pedal edema:  His weight is up by 7 pounds in the past three months,        however, per grandson, he has gained around 20 pounds in-between but lost some. The patient is not        short of breath. Will continue the same dose of Lasix. Recent labs showed renal function and electrolytes at        baseline. Instructed to take an additional dose of Lasix if the weight increases by more than two pounds in        a day or more than five pounds in one week. Will also check an echocardiogram before next visit since last        LV function assessment was five years back.  2.  Coronary artery disease:  Previous angioplasty " and myocardial infarction, stable with no angina. Continue        aspirin, statin, and beta-blocker.   3.  Hyperlipidemia:  Will check lipid panel before next visit.   4.  History of pulmonary embolism, on long-term anticoagulation.   5.  Followup in three months with echocardiogram and repeat labs.       Mitch Montelongo MD  JV/pap             Electronically Signed by: Makenna Daigle-, Other -Author on January 30, 2021 12:42:59 PM  Electronically Co-signed by: Mitch Montelongo MD -Reviewer on January 30, 2021 03:02:24 PM

## 2021-05-14 NOTE — PROGRESS NOTES
Progress Note      Patient Name: Alexander Rouse   Patient ID: [id number]   Sex: Male   YOB: 1935    Primary Care Provider: Xavier Vazquez MD   Referring Provider: Xavier Vazquez MD    Visit Date: April 21, 2021    Provider: Mitch Montelongo MD   Location: [facility]   Location Address: [address] [place] [address] [facility]  [address]  [place], [place]  [id number]   Location Phone: [phone]          Chief Complaint     Follow-up visit for coronary artery disease and congestive heart failure.       History Of Present Illness  REFERRING CARE PROVIDER: Xavier Vazquez MD   Alexander Rouse is a 85 year old /White male with coronary artery disease, previous angioplasty, hyperlipidemia, congestive heart failure and previous pulmonary embolism who is here for follow-up visit. During the recent visit on 01/13/2021 the dose of diuretics were adjusted. Today, the patient and grandson reports that his weight has been stable for the past several months. He has no significant pedal edema, no symptoms suggestive of orthopnea, shortness of breath improved as well. There is no chest pain or palpitations.   PAST MEDICAL HISTORY: 1) Coronary artery disease, previous myocardial infarction, angioplasty and stent placement to the left circumflex artery in 2008; 2) Hypotension; 3) LV ejection fraction is 50% per echocardiogram in 2016; 4) Hyperlipidemia; 5) History of pulmonary embolism, on long-term anticoagulation; 6) Chronic obstructive pulmonary disease; 7) History of lung cancer status post lung resection more than 10 years back.   PSYCHOSOCIAL HISTORY: Denies alcohol use. Previously used tobacco, but quit.   CURRENT MEDICATIONS: Medications have been reviewed and are as stated.      ALLERGIES:  No known drug allergies.       Review of Systems  · Cardiovascular  o Admits  o : swelling (feet, ankles, hands)  o Denies  o : palpitations (fast, fluttering, or skipping beats), shortness of breath while walking or lying flat,  "chest pain or angina pectoris   · Respiratory  o Admits  o : chronic or frequent cough      Vitals  Date Time BP Position Site L\R Cuff Size HR RR TEMP (F) WT  HT  BMI kg/m2 BSA m2 O2 Sat FR L/min FiO2 HC       04/21/2021 12:31 /74 Sitting    88 - R   209lbs 0oz 5'  9\" 30.86 2.15       04/21/2021 12:31 /74 Sitting                       Physical Examination  · Respiratory  o Auscultation of Lungs  o : Clear to auscultation bilaterally. No crackles or rhonchi.  · Cardiovascular  o Heart  o : S1, S2 is normally heard. No S3. No murmur, rubs, or gallops.  · Gastrointestinal  o Abdominal Examination  o : Soft, nontender, nondistended. No free fluid. Bowel sounds heard in all four quadrants.  · Extremities  o Extremities  o : Warm and well perfused. Trace edema bilaterally. Distal pulses present.  · EKG  o EKG  o : Was performed in the office today  o Results  o : Showed LV ejection fraction of 50%.  · Labs  o Labs  o : Done 04/15/2021 shows BUN of 22, creatinine 1.0, sodium is 141, potassium is 3.7, chloride is 98, total protein is 6.4, albumin is 3.6, globulin is 2.8, magnesium is 1.59, AST is 18, ALT is 13, alkaline phos is 126.           Assessment     1.  Chronic diastolic heart failure. His weight is stable and in fact he has lost 5 pounds in the past 3 months. No significant pedal edema or orthopnea. Recent labs showed normal electrolytes and renal functions. Continued diuretics at the current dose. Counseled again regarding low sodium diet and fluid restriction.   2.  Coronary artery disease. Currently stable with no angina. Continue aspirin, statin, and beta blocker.  3.  Hyperlipidemia. Continue statins. We will check lipid panel before next visit.   4.  History of pulmonary embolism. On long term anticoagulation.    FOLLOW-UP: In 6 months with repeat labs or earlier if needed.        Mitch Montelongo MD  JHUSSAIN/vh               Electronically Signed by: Vangie Forde-, OT -Author on April 27, " 2021 08:35:18 AM  Electronically Co-signed by: Mitch Montelongo MD -Reviewer on April 27, 2021 08:49:42 AM

## 2021-07-14 PROBLEM — T68.XXXA HYPOTHERMIA: Status: ACTIVE | Noted: 2021-01-01

## 2021-07-14 PROBLEM — R77.8 TROPONIN LEVEL ELEVATED: Status: ACTIVE | Noted: 2021-01-01

## 2021-07-14 PROBLEM — R41.82 ALTERED MENTAL STATUS: Status: ACTIVE | Noted: 2021-01-01

## 2021-07-14 NOTE — ED PROVIDER NOTES
Time: 2:57 PM EDT  Arrived by: ambulance from [place] ([facility])   Chief Complaint:   Chief Complaint   Patient presents with   • Shortness of Breath   • Altered Mental Status     History provided by: pt and ED nurse     History of Present Illness:  Patient is a 85 y.o. year old male that presents to the emergency department with AMS. This started today and is still present and constant. It is described as moderate in severity. Nothing worsens or improves symptoms.     The AMS is described as lethargy and confusion. Pt denies SOB. He has had a dry cough. ED nurse reports that pt was more alert yesterday.     Hx of TIA. Pt is on Eliquis.       History provided by:  Patient (ED nurse )      Recently seen: has not been recently seen in this ED     Patient Care Team  Primary Care Provider: Sharif Singer MD    Past Medical History:     No Known Allergies  Past Medical History:   Diagnosis Date   • Apnea, sleep    • Asthma    • COPD (chronic obstructive pulmonary disease) (CMS/[facility])    • Hyperlipidemia    • TIA (transient ischemic attack)      Past Surgical History:   Procedure Laterality Date   • CORONARY ANGIOPLASTY WITH STENT PLACEMENT     • LUNG CANCER SURGERY     • SHOULDER SURGERY Bilateral      Family History   Family history unknown: Yes       Home Medications:  Prior to Admission medications    Medication Sig Start Date End Date Taking? Authorizing Provider   albuterol (PROVENTIL) (2.5 MG/3ML) 0.083% nebulizer solution Take 2.5 mg by nebulization every 4 (four) hours as needed for wheezing.    Brenda Lowe MD   apixaban (ELIQUIS) 2.5 MG tablet tablet Take 2.5 mg by mouth 2 (Two) Times a Day.    Brenda Lowe MD   aspirin 81 MG tablet Take  by mouth. 5/30/13   Brenda Lowe MD   atorvastatin (LIPITOR) 80 MG tablet Take  by mouth. 4/24/13   Brenda Lowe MD   carvedilol (COREG) 3.125 MG tablet Take  by mouth. 5/30/13   Brenda Lowe MD   hydrochlorothiazide  "(MICROZIDE) 12.5 MG capsule  6/6/16   Brenda Lowe MD   lisinopril (PRINIVIL,ZESTRIL) 5 MG tablet  11/7/16   Brenda Lowe MD   montelukast (SINGULAIR) 10 MG tablet Take 10 mg by mouth Every Night.    Brenda Lowe MD   Selenium 200 MCG tablet Take  by mouth.    Brenda Lowe MD   selenium sulfide (SELSUN) 2.5 % shampoo Apply  topically daily as needed for dandruff.    Brenda Lowe MD        Social History:   Social History     Tobacco Use   • Smoking status: Current Every Day Smoker   Substance Use Topics   • Alcohol use: No   • Drug use: No     Recent travel: no     Record Review:  I have reviewed the patient's records in [facility].     Review of Systems:  Review of Systems   Constitutional: Negative for chills, diaphoresis and fever.   HENT: Negative for ear discharge and nosebleeds.    Eyes: Negative for photophobia.   Respiratory: Positive for cough. Negative for shortness of breath.    Cardiovascular: Negative for chest pain.   Gastrointestinal: Negative for diarrhea, nausea and vomiting.   Genitourinary: Negative for dysuria.   Musculoskeletal: Negative for back pain and neck pain.   Skin: Negative for rash.   Neurological: Negative for headaches.        AMS    All other systems reviewed and are negative.       Physical Exam:  BP 97/45 (BP Location: Right arm, Patient Position: Lying)   Pulse 83   Temp 98.3 °F (36.8 °C) (Oral)   Resp 16   Ht 180.3 cm (71\")   Wt 91 kg (200 lb 9.9 oz)   SpO2 94%   BMI 27.98 kg/m²     Physical Exam  Vitals and nursing note reviewed.   Constitutional:       General: He is not in acute distress.     Appearance: Normal appearance. He is ill-appearing.   HENT:      Head: Normocephalic and atraumatic.      Nose: Nose normal.   Eyes:      General: No scleral icterus.  Cardiovascular:      Rate and Rhythm: Normal rate and regular rhythm.      Heart sounds: Normal heart sounds.   Pulmonary:      Effort: Pulmonary effort is normal. No " respiratory distress.      Breath sounds: Rales (moderate left side ) present.   Abdominal:      Palpations: Abdomen is soft.      Tenderness: There is no abdominal tenderness.   Musculoskeletal:         General: Normal range of motion.      Cervical back: Neck supple.      Right lower leg: No edema.      Left lower leg: No edema.   Skin:     General: Skin is warm and dry.   Neurological:      General: No focal deficit present.      Mental Status: He is lethargic.      Sensory: No sensory deficit.      Motor: No weakness.              Medications in the Emergency Department:  Medications   sodium chloride 0.9 % flush 10 mL (has no administration in time range)   sodium chloride 0.9 % flush 10 mL (has no administration in time range)   sodium chloride 0.9 % flush 10 mL (10 mL Intravenous Given 7/15/21 2127)   sodium chloride 0.9 % flush 10 mL (has no administration in time range)   acetaminophen (TYLENOL) tablet 650 mg (has no administration in time range)   Pharmacy to Dose Zosyn (has no administration in time range)   aspirin EC tablet 81 mg (81 mg Oral Given 7/15/21 0948)   carvedilol (COREG) tablet 3.125 mg (3.125 mg Oral Given 7/15/21 2127)   arformoterol (BROVANA) nebulizer solution 15 mcg (15 mcg Nebulization Given 7/15/21 2034)   ipratropium-albuterol (DUO-NEB) nebulizer solution 3 mL (3 mL Nebulization Given 7/16/21 0024)   budesonide (PULMICORT) nebulizer solution 0.5 mg (0.5 mg Nebulization Given 7/15/21 2034)   nitroglycerin (NITROSTAT) SL tablet 0.4 mg (has no administration in time range)   furosemide (LASIX) tablet 40 mg (40 mg Oral Given 7/15/21 0948)   piperacillin-tazobactam (ZOSYN) 3.375 g in iso-osmotic dextrose 50 ml (premix) (3.375 g Intravenous New Bag 7/16/21 0244)   atorvastatin (LIPITOR) tablet 40 mg (40 mg Oral Given 7/15/21 2127)   piperacillin-tazobactam (ZOSYN) 3.375 g in iso-osmotic dextrose 50 ml (premix) (0 g Intravenous Stopped 7/14/21 1827)        Labs  Lab Results (last 24 hours)      Procedure Component Value Units Date/Time    Troponin [[id number]]  (Abnormal) Collected: 07/15/21 0829    Specimen: Blood Updated: 07/15/21 1043     Troponin T 0.043 ng/mL     Narrative:      Troponin T Reference Range:  <= 0.03 ng/mL-   Negative for AMI  >0.03 ng/mL-     Abnormal for myocardial necrosis.  Clinicians would have to utilize clinical acumen, EKG, Troponin and serial changes to determine if it is an Acute Myocardial Infarction or myocardial injury due to an underlying chronic condition.       Results may be falsely decreased if patient taking Biotin.      CBC Auto Differential [[id number]]  (Abnormal) Collected: 07/15/21 0829    Specimen: Blood Updated: 07/15/21 0950     WBC 13.80 10*3/mm3      RBC 4.20 10*6/mm3      Hemoglobin 10.1 g/dL      Hematocrit 32.8 %      MCV 78.1 fL      MCH 24.0 pg      MCHC 30.8 g/dL      RDW 22.0 %      RDW-SD 61.6 fl      MPV 0.0 fL      Platelets 237 10*3/mm3      Neutrophil % 83.6 %      Lymphocyte % 7.2 %      Monocyte % 7.9 %      Eosinophil % 0.8 %      Basophil % 0.1 %      Immature Grans % 0.4 %      Neutrophils, Absolute 11.53 10*3/mm3      Lymphocytes, Absolute 0.99 10*3/mm3      Monocytes, Absolute 1.09 10*3/mm3      Eosinophils, Absolute 0.11 10*3/mm3      Basophils, Absolute 0.02 10*3/mm3      Immature Grans, Absolute 0.06 10*3/mm3      nRBC 0.0 /100 WBC     Comprehensive Metabolic Panel [[id number]]  (Abnormal) Collected: 07/15/21 0829    Specimen: Blood Updated: 07/15/21 0957     Glucose 87 mg/dL      BUN 27 mg/dL      Creatinine 1.02 mg/dL      Sodium 144 mmol/L      Potassium 3.8 mmol/L      Chloride 101 mmol/L      CO2 38.4 mmol/L      Calcium 8.9 mg/dL      Total Protein 5.7 g/dL      Albumin 3.40 g/dL      ALT (SGPT) 19 U/L      AST (SGOT) 15 U/L      Alkaline Phosphatase 103 U/L      Total Bilirubin 0.9 mg/dL      eGFR Non African Amer 69 mL/min/1.73      Globulin 2.3 gm/dL      A/G Ratio 1.5 g/dL      BUN/Creatinine Ratio 26.5     Anion Gap 4.6  mmol/L     Narrative:      GFR Normal >60  Chronic Kidney Disease <60  Kidney Failure <15      Magnesium [[id number]]  (Normal) Collected: 07/15/21 0829    Specimen: Blood Updated: 07/15/21 0958     Magnesium 1.9 mg/dL     Scan Slide [[id number]] Collected: 07/15/21 0829    Specimen: Blood Updated: 07/15/21 0952     Anisocytosis Slight/1+     Hypochromia Slight/1+     Microcytes Slight/1+     Macrocytes Slight/1+     Ovalocytes Slight/1+     Poikilocytes Slight/1+     WBC Morphology Normal     Platelet Estimate Adequate     Clumped Platelets Present     Large Platelets Slight/1+    MRSA Screen, PCR (Inpatient) - Swab, Nares [[id number]]  (Abnormal) Collected: 07/15/21 1103    Specimen: Swab from Nares Updated: 07/15/21 2010     MRSA PCR MRSA Detected    Troponin [[id number]]  (Abnormal) Collected: 07/15/21 1154    Specimen: Blood Updated: 07/15/21 1300     Troponin T 0.040 ng/mL     Narrative:      Troponin T Reference Range:  <= 0.03 ng/mL-   Negative for AMI  >0.03 ng/mL-     Abnormal for myocardial necrosis.  Clinicians would have to utilize clinical acumen, EKG, Troponin and serial changes to determine if it is an Acute Myocardial Infarction or myocardial injury due to an underlying chronic condition.       Results may be falsely decreased if patient taking Biotin.      Aspergillus Antibodies [[id number]] Collected: 07/15/21 1154    Specimen: Blood Updated: 07/15/21 1200    Aspergillus Galactomannan Antigen [[id number]] Collected: 07/15/21 1154    Specimen: Blood Updated: 07/15/21 1200    Fungitell B-D Glucan [[id number]] Collected: 07/15/21 1154    Specimen: Blood Updated: 07/15/21 1200    Troponin [[id number]]  (Abnormal) Collected: 07/15/21 1820    Specimen: Blood Updated: 07/15/21 1957     Troponin T 0.034 ng/mL     Narrative:      Troponin T Reference Range:  <= 0.03 ng/mL-   Negative for AMI  >0.03 ng/mL-     Abnormal for myocardial necrosis.  Clinicians would have to utilize clinical acumen, EKG, Troponin and  serial changes to determine if it is an Acute Myocardial Infarction or myocardial injury due to an underlying chronic condition.       Results may be falsely decreased if patient taking Biotin.      Basic Metabolic Panel [[id number]]  (Abnormal) Collected: 07/16/21 0425    Specimen: Blood Updated: 07/16/21 0527     Glucose 89 mg/dL      BUN 28 mg/dL      Creatinine 0.98 mg/dL      Sodium 139 mmol/L      Potassium 3.5 mmol/L      Chloride 99 mmol/L      CO2 33.3 mmol/L      Calcium 7.9 mg/dL      eGFR Non African Amer 73 mL/min/1.73      BUN/Creatinine Ratio 28.6     Anion Gap 6.7 mmol/L     Narrative:      GFR Normal >60  Chronic Kidney Disease <60  Kidney Failure <15      Magnesium [[id number]]  (Normal) Collected: 07/16/21 0425    Specimen: Blood Updated: 07/16/21 0527     Magnesium 1.8 mg/dL     CBC & Differential [[id number]]  (Abnormal) Collected: 07/16/21 0425    Specimen: Blood Updated: 07/16/21 0535    Narrative:      The following orders were created for panel order CBC & Differential.  Procedure                               Abnormality         Status                     ---------                               -----------         ------                     Scan Slide[[id number]]                   Normal              Final result               CBC Auto Differential[[id number]]        Abnormal            Final result                 Please view results for these tests on the individual orders.    CBC Auto Differential [[id number]]  (Abnormal) Collected: 07/16/21 0425    Specimen: Blood Updated: 07/16/21 0535     WBC 12.24 10*3/mm3      RBC 3.68 10*6/mm3      Hemoglobin 8.7 g/dL      Hematocrit 28.4 %      MCV 77.2 fL      MCH 23.6 pg      MCHC 30.6 g/dL      RDW 21.7 %      RDW-SD 59.8 fl      MPV 12.3 fL      Platelets 202 10*3/mm3      Neutrophil % 81.3 %      Lymphocyte % 7.5 %      Monocyte % 9.6 %      Eosinophil % 1.1 %      Basophil % 0.1 %      Immature Grans % 0.4 %      Neutrophils, Absolute 9.95  10*3/mm3      Lymphocytes, Absolute 0.92 10*3/mm3      Monocytes, Absolute 1.18 10*3/mm3      Eosinophils, Absolute 0.13 10*3/mm3      Basophils, Absolute 0.01 10*3/mm3      Immature Grans, Absolute 0.05 10*3/mm3      nRBC 0.0 /100 WBC     Scan Slide [[id number]]  (Normal) Collected: 07/16/21 0425    Specimen: Blood Updated: 07/16/21 0535     RBC Morphology Normal     WBC Morphology Normal     Platelet Morphology Normal           Imaging:  No Radiology Exams Resulted Within Past 24 Hours    Procedures:  Procedures  EKG: Sinus rhythm with a rate of 80. First degree AV block. Normal QRS. Nonspecific ST changes. Normal QT. No previous for comparison.     Progress                            Medical Decision Making:  MDM  Number of Diagnoses or Management Options     Amount and/or Complexity of Data Reviewed  Clinical lab tests: ordered and reviewed  Tests in the radiology section of CPT®: ordered and reviewed  Obtain history from someone other than the patient: yes  Discuss the patient with other providers: yes       Patient denies chest pain but has an elevated troponin.  ECG is nonischemic.  Patient was discussed with Dr. Ho of cardiology who agrees to consultation but has no specific recommendations at this time.  Patient is currently on antiplatelet and anticoagulation medication already.  Patient discussed with hospitalist for admission.  Due to patient's advanced age and hypothermia there is concern for possible infections and patient was empirically covered with Zosyn.  The patient's airway is intact, vital signs, and respiratory status are safe for admission at this time.        Final diagnoses:   Dysphagia, oropharyngeal   Abnormal chest CT   Cavitary lesion of lung        Disposition:  ED Disposition     ED Disposition Condition Comment    Decision to Admit  Level of Care: Telemetry [5]   Diagnosis: Troponin level elevated [[id number]]   Admitting Physician: ESTRADA OLIVA [[id number]]   Isolate for COVID?: No [0]    Certification: I Certify That Inpatient Hospital Services Are Medically Necessary For Greater Than 2 Midnights            Documentation assistance provided by Kennedi Isaac acting as scribe for Mitch Ramirez MD. Information recorded by the scribe was done at my direction and has been verified and validated by me.         Kennedi Isaac  07/14/21 1506       Kennedi Isaac  07/14/21 1507       Mitch Ramirez MD  07/16/21 0704

## 2021-07-14 NOTE — H&P
[facility] HISTORY AND PHYSICAL  Date: 2021   Patient Name: Alexander Rouse  : 1935  MRN: [id number]  Primary Care Physician:  Sharif Singer MD  Date of admission: 2021    Subjective   Subjective     Chief Complaint: AMS/Lethargy     HPI:    Alexander Rouse is a 85 y.o. male past medical history of coronary artery disease status post stenting in , diastolic heart failure, COPD, pulmonary realism on chronic anticoagulation, and remote history of lung cancer status post right lobectomy who presents from rehab facility with altered mental status/confusion    History is obtained from the patient's 2 sons were at bedside.  About 7 to 10 days ago the patient woke up [place] he did not want a walk.  As result, the patient was placed in rehab where he made dramatic improvements.  They state that at baseline he has a chronic cough due to COPD and he also sounds a bit gurgly at baseline.  Patient is also chronically on oxygen.  He is to be treated for acute COPD exacerbation at the outside hospital started on .  Yesterday he seemed completely normal and was walking on his own volition.  However, this morning he woke up and he was not his normal self.  He is very sleepy and minimally interactive.  He had no worsening cough.  No worsening shortness of breath.  No chest pain.  No orthopnea.  No PND.  As a result of the symptoms the patient was brought to the emergency department for further evaluation.  Of note the patient had his Covid vaccine.    In the emergency department his vitals were as follows he was found of a temperature of 95.6, heart rate in the 80s, blood pressure 125/65, respiratory rate 16, 96% on room air.  CBC shows white blood cell count of 9 and hemoglobin of 10.5.  CMP shows a sodium of 140, potassium 4.4, bicarb of 35 and a creatinine of 0.97.  Lactate was 1.1.  BNP was 400 and troponin was slightly elevated 0.036.  ABG showed 7.44/49.8/70.4.  EKG showed  no abnormalities or signs of ischemia.  The case was discussed with cardiology and they wanted the patient to be trended overnight and continued on his Eliquis for anticoagulation.  Chest x-ray showed no abnormalities except for maybe a small right-sided pleural effusion.  CT of the head showed no abnormalities except for some chronic atrophy.  Blood cultures were sent.  Urinalysis was bland.  Due to the fact that the patient has altered mental status and hypothermia I asked the emergency department to give him a dose of Zosyn for possible bacteremia.  He will be admitted to the hospital for altered mental status, hypothermia, and possible bacteremia    Cannot review systems due to confusion    Personal History     Past Medical History:   COPD on chronic oxygen   Lung cancer.   Obstructive sleep apnea.   Coronary artery disease with MI in 2008 and stent to his left circumflex   Hypertension.   Hyperlipidemia.   DJD.   Pulmonary embolism on chronic anticoagulation   Diastolic heart failure with echocardiogram showing LVEF 50 percent 2016    SURGICAL HISTORY:   Cardiac stent 2008   Right-sided partial lobectomy of the lung for lung cancer.   Shoulder surgery.    SOCIAL HISTORY:  Currently at [facility].  History of long-term tobacco use but quit.  No alcohol.    FAMILY HISTORY:  Negative.    ALLERGIES:  No known drug allergies.      Home Medications:  Selenium, albuterol, apixaban, aspirin, atorvastatin, carvedilol, hydroCHLOROthiazide, lisinopril, montelukast, and selenium sulfide    Allergies:  No Known Allergies    Objective   Objective     Vitals:   Temp:  [95.6 °F (35.3 °C)-96.4 °F (35.8 °C)] 96.4 °F (35.8 °C)  Heart Rate:  [80-81] 81  Resp:  [16] 16  BP: (104-125)/(59-68) 112/68    Physical Exam    Constitutional: Awake, alert, no acute distress   Eyes: Pupils equal, sclerae anicteric, no conjunctival injection   HENT: NCAT, mucous membranes moist.  Can move his neck all around with no pain or problems.   Could touch his chin to his chest could extend his neck with no pain   Neck: Supple, no thyromegaly, no lymphadenopathy, trachea midline   Respiratory: Coarse breath sounds throughout.  Slightly gurgly but this is his baseline per his sounds.  No wheezing.  No rhonchi   Cardiovascular: RRR, no murmurs, rubs, or gallops, palpable pedal pulses bilaterally   Gastrointestinal: Positive bowel sounds, soft, nontender, nondistended   Musculoskeletal:  patient has 1+ pitting edema on bilateral lower extremities up to his knee that this is his baseline per his son's   Psychiatric: Appropriate affect, cooperative   Neurologic: Oriented to self, cannot fully cooperate with neuro exam but no focal deficits   Skin: No rashes     Result Review    Result Review:  I have personally reviewed the results from the time of this admission to 7/14/2021 17:19 EDT and agree with these findings:      Assessment/Plan   Assessment / Plan     Assessment/Plan:  Hypothermia discerning for bacteremia  Acute COPD exacerbation being treated at [place]   --We will hold steroids at this time due to no wheezing on exam  Elevated troponin with history of coronary artery disease   History of diastolic heart failure  History of pulmonary  embolism chronic anticoagulation  COVID-19 vaccine in the distant past     Alexander is a 85-year-old gentleman with past medical history of coronary artery disease status post stenting in 2008, diastolic heart failure, COPD on home oxygen, remote history of lung cancer, and history of pulmonary embolism on chronic anticoagulation who presents today with altered mental status from his nursing home.  History was primarily obtained from his sons.  He was not quite himself about a week ago and stopped walking and as result was placed in rehab and [place].  This week he is worked with physical therapy and has done relatively well.  Yesterday was his normal self and then this morning woke up confused and lethargic.   When he presented to ER he was hypothermic, his chest x-ray showed no real concerning abnormalities and his UA was bland.  Considered meningitis due to lethargy and hypothermia but could move neck in all directions with no pain.  However, there was some concern infection for it so he was started on Zosyn and will need to be followed up for possible bacteremia causing his hypothermia.  He was also noted to have an elevated troponin and cardiology was consulted.  He has no chest pain and no signs of ischemia on his EKG.  We will get serial troponins and EKGs and he will be evaluated by cardiology tomorrow.  He is currently anticoagulated so he will remain on his anticoagulation.      Plan:  --Admit to telemetry bed due to elevated troponin  --Serial troponins and EKGs  --Nitroglycerin for chest pain  --Continue Eliquis  --Consult cardiology  --Continue atorvastatin, carvedilol, and aspirin  --Hold HCTZ and lisinopril  --Continue home Lasix  --Bear hugger for hypothermia and treat possible bacteremia  --Blood culture and urine culture  --Sputum culture  --We will start Zosyn due to hypothermia without a source concerning for bacteremia we will hold vancomycin at this time  --CT scan of the chest due to area that is concerning for possible pleural effusion  --Strep Legionella urine antigen  --Send influenza  --COVID-19 in case patient is to go to Cath Lab tomorrow  --Giancarlo/Pulmicort/duo nebs  --He was being treated with prednisone for COPD exacerbation that started on 7/9 since he has completed 5 days and has no wheezing will hold that medication  --His diet is n.p.o. but if you comes more responsive consider feeding him until midnight        DVT prophylaxis:  Continue Eliquis  CODE STATUS:    DNR/DNI prolonged conversation with sons who are his POA       Admission Status:  I believe this patient meets admission status.    Electronically signed by Marquise Knox MD, 07/14/21, 5:19 PM EDT.

## 2021-07-15 PROBLEM — J98.4 CAVITARY LESION OF LUNG: Status: ACTIVE | Noted: 2021-01-01

## 2021-07-15 PROBLEM — R93.89 ABNORMAL CHEST CT: Status: ACTIVE | Noted: 2021-01-01

## 2021-07-15 NOTE — CONSULTS
[facility]   Cardiology Consult Note    Patient Name: Alexander Rouse  : 1935  MRN: [id number]  Primary Care Physician:  Sharif Singer MD  Referring Physician: No ref. provider found  Date of admission: 2021    Subjective   Subjective     Reason for Consult/ Chief Complaint: Altered mental status    HPI:  Alexander Rouse is a 85 y.o. male admitted with altered mental status.  Denies any chest pain.  Shortness of breath is stable.  Noticed to have slightly increased troponin at 0.02.  He has previous history of coronary disease and COPD.    Review of Systems:   Constitutional no fever,  no weight loss   Skin no rash   Otolaryngeal no difficulty swallowing   Cardiovascular See HPI   Pulmonary no cough, no sputum production   Gastrointestinal no constipation, no diarrhea   Genitourinary no dysuria, no hematuria   Hematologic no easy bruisability, no abnormal bleeding   Musculoskeletal no muscle pain   Neurologic no dizziness, no falls         Personal History       Past Medical/Surgical History:   Past Medical History:   Diagnosis Date   • Apnea, sleep    • Asthma    • COPD (chronic obstructive pulmonary disease) (CMS/[facility])    • Hyperlipidemia    • TIA (transient ischemic attack)      Past Surgical History:   Procedure Laterality Date   • CORONARY ANGIOPLASTY WITH STENT PLACEMENT     • LUNG CANCER SURGERY     • SHOULDER SURGERY Bilateral          Family History:  Family History of CAD.    Social History:  reports that he has been smoking. He does not have any smokeless tobacco history on file. He reports that he does not drink alcohol and does not use drugs.    Medications:  Medications Prior to Admission   Medication Sig Dispense Refill Last Dose   • albuterol (PROVENTIL) (2.5 MG/3ML) 0.083% nebulizer solution Take 2.5 mg by nebulization every 4 (four) hours as needed for wheezing.   2021 at Unknown time   • apixaban (ELIQUIS) 5 MG tablet tablet Take 5 mg by mouth 2 (Two) Times a Day.    7/14/2021 at Unknown time   • aspirin 81 MG tablet Take 81 mg by mouth Daily.   7/14/2021 at Unknown time   • atorvastatin (LIPITOR) 80 MG tablet Take 40 mg by mouth Every Night.   7/13/2021 at Unknown time   • carvedilol (COREG) 3.125 MG tablet Take 3.125 mg by mouth 2 (Two) Times a Day With Meals.   7/14/2021 at Unknown time   • cetirizine (zyrTEC) 10 MG tablet Take 10 mg by mouth Daily.   7/14/2021 at Unknown time   • cholecalciferol (VITAMIN D3) 25 MCG (1000 UT) tablet Take 1,000 Units by mouth Daily.   7/14/2021 at Unknown time   • furosemide (LASIX) 40 MG tablet Take 40 mg by mouth Daily.   7/14/2021 at Unknown time   • guaiFENesin (MUCINEX) 600 MG 12 hr tablet Take 1,200 mg by mouth 2 (Two) Times a Day.   7/14/2021 at Unknown time   • ipratropium-albuterol (DUO-NEB) 0.5-2.5 mg/3 ml nebulizer Take 3 mL by nebulization Every 4 (Four) Hours As Needed for Wheezing.   7/14/2021 at Unknown time   • mometasone-formoterol (DULERA 100) 100-5 MCG/ACT inhaler Inhale 2 puffs 2 (Two) Times a Day.   7/14/2021 at Unknown time   • montelukast (SINGULAIR) 10 MG tablet Take 10 mg by mouth Every Night.   7/13/2021 at Unknown time   • omeprazole (priLOSEC) 40 MG capsule Take 40 mg by mouth Daily.   7/14/2021 at Unknown time   • Selenium 200 MCG tablet Take 200 mcg by mouth.   7/14/2021 at Unknown time   • traZODone (DESYREL) 50 MG tablet Take 50 mg by mouth Every Night.   7/13/2021 at Unknown time     Current medications:  arformoterol, 15 mcg, Nebulization, BID - RT  aspirin, 81 mg, Oral, Daily  atorvastatin, 40 mg, Oral, Nightly  budesonide, 0.5 mg, Nebulization, BID - RT  carvedilol, 3.125 mg, Oral, Q12H  furosemide, 40 mg, Oral, Daily  ipratropium-albuterol, 3 mL, Nebulization, 4x Daily - RT  piperacillin-tazobactam, 3.375 g, Intravenous, Q8H  sodium chloride, 10 mL, Intravenous, Q12H      Current IV drips:  Pharmacy to Dose Zosyn,         Allergies:  No Known Allergies    Objective    Objective     Vitals:   Temp:  [95.6 °F  (35.3 °C)-97.9 °F (36.6 °C)] 97.9 °F (36.6 °C)  Heart Rate:  [76-91] 77  Resp:  [16-20] 20  BP: (101-128)/(48-70) 103/52  Flow (L/min):  [2] 2      Physical Exam:   Constitutional: Awake, alert, No acute distress    Eyes: PERRLA, sclerae anicteric, no conjunctival injection   HENT: NCAT, mucous membranes moist   Neck: Supple, no thyromegaly, no lymphadenopathy, trachea midline   Respiratory: Decreased at the bases bilaterally, nonlabored respirations    Cardiovascular: RRR, no murmurs, rubs, or gallops, palpable pedal pulses bilaterally   Gastrointestinal: Positive bowel sounds, soft, nontender, nondistended   Musculoskeletal: No bilateral ankle edema, no clubbing or cyanosis to extremities   Psychiatric: Appropriate affect, cooperative   Neurologic: Oriented x 3, strength symmetric in all extremities, Cranial Nerves grossly intact to confrontation, speech clear   Skin: No rashes.    Result Review    Result Review:  I have personally reviewed the results from the time of this admission to 7/15/2021 09:22 EDT and agree with these findings:  [x]  Laboratory  [x]  EKG/Telemetry   [x]  Cardiology/Vascular   []  Pathology  [x]  Old records  [x]  Medications  Basic Metabolic Panel    Sodium Sodium   Date Value Ref Range Status   07/14/2021 140 136 - 145 mmol/L Final     Sodium, Arterial   Date Value Ref Range Status   07/14/2021 136.6 136 - 146 mmol/L Final      Potassium Potassium   Date Value Ref Range Status   07/14/2021 4.4 3.5 - 5.2 mmol/L Final      Chloride Chloride   Date Value Ref Range Status   07/14/2021 98 98 - 107 mmol/L Final      Bicarbonate No results found for: PLASMABICARB   BUN BUN   Date Value Ref Range Status   07/14/2021 30 (H) 8 - 23 mg/dL Final      Creatinine Creatinine   Date Value Ref Range Status   07/14/2021 0.97 0.76 - 1.27 mg/dL Final      Calcium Calcium   Date Value Ref Range Status   07/14/2021 8.8 8.6 - 10.5 mg/dL Final              EKG shows sinus rhythm with no acute  changes.  Telemetry reviewed    Assessment / Plan     Impression:   1.  Hypothermia probably septic  2.  Coronary disease history of PTCA/stent  3.  Chronic diastolic heart failure.  4.  COPD  5.  Slightly increased troponin probably secondary to above.      Plan:   Echocardiogram.  Treatment for hypothermia and probable sepsis.  Continue current home medications.  Lexiscan stress test when patient is improved.    Electronically signed by López Ho MD, 07/15/21, 9:22 AM EDT.

## 2021-07-15 NOTE — CONSULTS
Pulmonary / Critical Care Consult Note      Patient Name: Alexander Rouse  : 1935  MRN: [id number]  Primary Care Physician:  Sharif Singer MD  Referring Physician: Tahir Rojas MD  Date of admission: 2021    Subjective   Subjective     Reason for Consult/ Chief Complaint:   Abnormal chest CT    HPI:  Alexander Rouse is a 85 y.o. male with history of COPD, pulmonary embolism on chronic anticoagulation, history of adenocarcinoma status post right upper lobe wedge resection back in  was admitted yesterday from rehab facility with confusion.  There is a report of 10 days of increasing confusion and lethargy.  Also came in hypothermic.  He has a chronic cough which is at baseline with no sputum production.  Patient denies any dyspnea, wheezing, headaches, chest pain, weight loss or hemoptysis.  Patient was hypothermic in the emergency department and had evidence concerning for sepsis.  Chest CT shows cavitary right lower lobe lesion versus pneumonia.  Also air-fluid level versus obstructing at the right lower lobe bronchus.    Patient is resting in bed on 2 L of oxygen.  He denies any coughing or choking on food or aspiration.  Son is at bedside and I discussed CAT scan results with him.        Review of Systems  Constitutional symptoms: Fatigue, otherwise Denied complaints   Ear, nose, throat: Denied complaints  Cardiovascular:  Denied complaints  Respiratory: Cough, otherwise denied complaints  Gastrointestinal: Denied complaints  Musculoskeletal: Weakness, otherwise denied complaints  Genitourinary: Denied complaints  Allergy / Immunology: Denied complaints  Hematologic: Denied complaints  Neurologic: Denied complaints  Skin: Denied complaints  Endocrine: Denied complaints  Psychiatric: Denied complaints      Personal History     Past Medical History:   Diagnosis Date   • Apnea, sleep    • Asthma    • COPD (chronic obstructive pulmonary disease) (CMS/HCC)    • Hyperlipidemia    • TIA  (transient ischemic attack)        Past Surgical History:   Procedure Laterality Date   • CORONARY ANGIOPLASTY WITH STENT PLACEMENT     • LUNG CANCER SURGERY     • SHOULDER SURGERY Bilateral        Family History:   No pulmonary comorbidities in primary family members    Social History:  reports that he has been smoking. He does not have any smokeless tobacco history on file. He reports that he does not drink alcohol and does not use drugs.    Home Medications:  Selenium, albuterol, apixaban, aspirin, atorvastatin, carvedilol, cetirizine, cholecalciferol, furosemide, guaiFENesin, ipratropium-albuterol, mometasone-formoterol, montelukast, omeprazole, and traZODone    Allergies:  No Known Allergies    Objective    Objective     Vitals:   Temp:  [95.6 °F (35.3 °C)-97.9 °F (36.6 °C)] 97.9 °F (36.6 °C)  Heart Rate:  [76-91] 77  Resp:  [16-20] 20  BP: (101-128)/(48-70) 103/52  Flow (L/min):  [2] 2    Physical Exam:  Vital Signs Reviewed   WDWN, Alert, NAD.    HEENT:  PERRL, EOMI.  OP, nares clear, no sinus tenderness  Neck:  Supple, no JVD, no thyromegaly  Lymph: no axillary, cervical, supraclavicular lymphadenopathy noted bilaterally  Chest:  good aeration, diminished with coarse crackles and rhonchi right base, tympanic to percussion bilaterally, no work of breathing noted  CV: RRR, no MGR, pulses 2+, equal.  Abd:  Soft, NT, ND, + BS, no HSM  EXT:  no clubbing, no cyanosis, no edema, no joint tenderness  Neuro:  A&Ox3, CN grossly intact, no focal deficits.  Skin: No rashes or lesions noted      Result Review    Result Review:  I have personally reviewed the results from the time of this admission to 7/15/2021 08:50 EDT and agree with these findings:  [x]  Laboratory  [x]  Microbiology  [x]  Radiology  [x]  EKG/Telemetry   []  Cardiology/Vascular   []  Pathology  []  Old records  []  Other:  Most notable findings include: Chest CT personally reviewed       Assessment/Plan   Assessment / Plan     Active Hospital  Problems:  Active Hospital Problems    Diagnosis    • **Troponin level elevated    • Hypothermia    • Altered mental status          Impression:  Abnormal chest CT concerning for endobronchial lesion versus mucous plug with right lower lobe lung abscess  Right lower lobe postobstructive pneumonia with lung abscess from unspecified organism  Sepsis  Hypothermia  COPD without exacerbation  Question aspiration and airways  History of non-small cell lung cancer/adenocarcinoma status post right upper lobe wedge resection in 2012  Tobacco abuse of cigarettes in remission    Plan:  Differential diagnoses mucous plugging with postobstructive pneumonia/lung abscess versus foreign body versus endobronchial lesion/cancer.  Take patient tomorrow for bronchoscopy with endobronchial ultrasound/fine-needle aspiration, brushings, biopsies, bronchoalveolar lavage  I have discussed the risks of the procedure with the patient including pneumothorax, hemothorax, bleeding, hypoxia, required mechanical ventilation and death. The patient recognizes these findings, acknowledges these findings and is agreeable to the procedure.  N.p.o. after midnight  Hold Eliquis  Continue antibiotics.  De-escalate based on cultures  Continue nebulizers and bronchopulmonary hygiene  Agree speech therapy evaluation  Wean O2 to keep SPO2 greater than 90%    DVT prophylaxis:  No DVT prophylaxis order currently exists.     Code Status and Medical Interventions:   Ordered at: 07/14/21 1752     Level Of Support Discussed With:    Health Care Surrogate     Code Status:    No CPR     Medical Interventions (Level of Support Prior to Arrest):    Full     Comments:    POA son was at bedside and we discussed          Labs, imaging, microbiology, notes and medications personally reviewed.  Discussed with primary and bedside nurse    Thank you for involving me in the care of this patient.    Electronically signed by Artur Randolph MD, 07/15/21, 12:32 PM EDT.

## 2021-07-15 NOTE — PLAN OF CARE
Goal Outcome Evaluation:  Plan of Care Reviewed With: patient        Progress: improving  Outcome Summary: See Note

## 2021-07-15 NOTE — THERAPY EVALUATION
Acute Care - Speech Language Pathology   Swallow Initial Evaluation  [facility]     Patient Name: Alexander Rouse  : 1935  MRN: [id number]  Today's Date: 7/15/2021               Admit Date: 2021    Visit Dx:     ICD-10-CM ICD-9-CM   1. Cavitary lesion of lung  J98.4 518.89   2. Dysphagia, oropharyngeal  R13.12 787.22   3. Abnormal chest CT  R93.89 793.2     Patient Active Problem List   Diagnosis   • CAFL (chronic airflow limitation) (CMS/[facility])   • Cough   • Cancer of lower lobe of lung (CMS/HCC)   • Disease of larynx   • Rotator cuff tear arthropathy   • Status post complete repair of rotator cuff   • Nodule of right lung   • Troponin level elevated   • Hypothermia   • Altered mental status     Past Medical History:   Diagnosis Date   • Apnea, sleep    • Asthma    • COPD (chronic obstructive pulmonary disease) (CMS/[facility])    • Hyperlipidemia    • TIA (transient ischemic attack)      Past Surgical History:   Procedure Laterality Date   • CORONARY ANGIOPLASTY WITH STENT PLACEMENT     • LUNG CANCER SURGERY     • SHOULDER SURGERY Bilateral      PAIN SCALE: None noted    PRECAUTIONS/CONTRAINDICATIONS: None noted    SUSPECTED ABUSE/NEGLECT/EXPLOITATION: None noted    SOCIAL/PSYCHOLOGICAL NEEDS/BARRIERS: None noted    PAST SOCIAL HISTORY: Resident of [place]    PRIOR FUNCTION: Some recent decline in function    PATIENT GOALS/EXPECTATIONS: Did not report    HISTORY: This patient is an 85-year-old white male admitted with COPD exacerbation and hypothermia.  Patient denies any history of dysphagia.  Son is at bedside denies any overt signs and symptoms of aspiration.    CURRENT DIET LEVEL: Regular diet    OBJECTIVE:    TEST ADMINISTERED: Clinical dysphagia evaluation    COGNITION/SAFETY AWARENESS: Alert and oriented    BEHAVIORAL OBSERVATIONS: Does not and cooperative    ORAL MOTOR EXAM: Generalized oral motor weakness is noted.    VOICE QUALITY: Reduced vocal intensity/breathiness    REFLEX EXAM:  Deferred    POSTURE: 90 degrees upright    FEEDING/SWALLOWING FUNCTION: Assessed with thin liquids, nectar thick liquids, purée consistencies and soft solids    CLINICAL OBSERVATIONS: Oral stage is characterized by adequate bolus preparation and control.  Appears to have timely oral transit.  Pharyngeal phase is mildly delayed and disorganized with cough noted with thin liquid trials.  Patient appears to tolerate nectar thick liquids well without clinical sign or symptom of aspiration.  Vocal quality remained clear to cervical auscultation.  Denies globus sensation after completion of swallow.  Appears to have good laryngeal elevation per palpation.    DYSPHAGIA CRITERIA: Risk of aspiration    FUNCTIONAL ASSESSMENT INSTRUMENT: Patient currently scored a level 5 of 7 on Functional Communication Measures for swallowing indicating a 20-39% limitation in function.    ASSESSMENT/ PLAN OF CARE:  Pt presents with limitations, noted below, that impede his ability to swallow safely. The skills of a therapist will be required to safely and effectively implement the following treatment plan to restore maximal level of function.    PROBLEMS:  1.  Dysphagia   LTG 1: (30 days) patient will increase ability to tolerate least restrictive diet and improve functional communication measure for swallowing to a 6 of 7   STG 1a: (14 days) patient will tolerate mechanical soft diet and nectar thick liquids without clinical sign or symptom of aspiration with 8 of 10 trials.   STG 1b: (14 days) patient tolerate regular solids and thin liquids without clinical sign or symptom of aspiration with 8 of 10 trials   STG 1c: (14 days) patient/family teaching regarding diet recommendations, safe swallow strategies and signs and symptoms of aspiration.   TREATMENT: Dysphagia therapy to ensure diet tolerance, advance to least restrictive diet and analyze for aspiration risk.    FREQUENCY/DURATION: Twice daily 5 times a week    REHAB POTENTIAL:  Pt  has good rehab potential.  The following limitations may influence improvement/ length of tx medical status.    RECOMMENDATIONS:   1.   DIET: Mechanical soft diet-nectar thick liquids    2.  POSITION: 90 degrees upright for all intake    3.  COMPENSATORY STRATEGIES: Slow rate, small bites/drinks, encourage frequent rest periods of short of breath during male, oral meds whole in applesauce    Patient could benefit from modified barium swallow study.    Pt/responsible party agrees with plan of care and has been informed of all alternatives, risks and benefits.    EDUCATION  The patient has been educated in the following areas:   Modified Diet Instruction.           Dulce Maria Larsen, SLP  7/15/2021

## 2021-07-15 NOTE — PROGRESS NOTES
[facility]   Hospitalist Progress Note  Date: 7/15/2021  Patient Name: Alexander Rouse  : 1935  MRN: [id number]  Date of admission: 2021  Consultants:   -Pulmonology: Dr. Artur Randolph  -Cardiology: Dr. López Ho    Subjective   Subjective     Chief Complaint: Altered mental status, lethargy    Summary:   Alexander Rouse is a 85 y.o. 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  Upon presentation to the ED patient was hypothermic, chest x-ray did not show any acute concerning abnormalities and urinalysis was bland.  Due to concern for infection patient was started on Zosyn and infectious work-up started.  CT of the chest obtained and was abnormal, pulmonology consulted to assist in care.  Troponins were slightly elevated admission, cardiology consulted to assist in care.    Interval Followup:   No acute events overnight.  Due to abnormal CT scan pulmonology was consulted.  Patient denies any chest pain, shortness breath, abdominal pain nausea or vomiting.  Nursing with no additional acute issues to report.    Antibiotics:   -Zosyn    Review of Systems   10 point review of systems performed and negative unless stated otherwise under subjective.    Objective   Objective     Vitals:   Temp:  [96.4 °F (35.8 °C)-97.9 °F (36.6 °C)] 97.9 °F (36.6 °C)  Heart Rate:  [76-91] 81  Resp:  [16-20] 20  BP: (101-128)/(48-70) 113/63  Flow (L/min):  [2] 2  Physical Exam   Gen: No acute distress, Conversant, Pleasant, sitting up in bed eating breakfast   HEENT: MMM, Atraumatic  Neck: Supple, Trachea midline  Resp: Good aeration, diminished breath sounds bilaterally, coarse crackles and rhonchi in right base, normal respiratory effort, equal chest rise bilaterally  Card: RRR, No  m/r/g  Abd: Soft, Nontender, Nondistended, + bowel sounds  Ext: No cyanosis, No clubbing  Neuro: CN II-XII grossly intact, No focal deficits appreciated  Psych: AAO x 3, Normal mood, Normal affect    Result Review    Result Review:  I have personally reviewed the results from the time of this admission to 7/15/2021 15:19 EDT and agree with these findings:  [x]  Laboratory: WBC and hemoglobin elevated, electrolytes stable, creatinine stable, troponin increased.  [x]  Microbiology: Blood culture no growth to date.  Strep pneumo and Legionella urinary antigens negative.  COVID-19 screen negative.  MRSA screen pending.  [x]  Radiology: CT chest reviewed and abnormal.  CT showed cavitary right lower lobe lesion versus pneumonia.  Air-fluid level versus obstructing right lower lobe bronchus also appreciated.  [x]  EKG/Telemetry: Sinus rhythm.  PVCs.  NSVT.  Bradycardia.  []  Cardiology/Vascular:    []  Pathology:  []  Old records:  []  Other:    Assessment/Plan   Assessment / Plan     Assessment:  Right lower lobe postobstructive pneumonia with lung abscess from unspecified organism  Abnormal chest CT concerning for endobronchial lesion versus mucous plug with right lower lobe lung abscess  Sepsis due to above  Hypothermia  COPD without exacerbation  Possible aspiration  Elevated troponin  History of diastolic heart failure, not acutely exacerbated  History of tobacco abuse of cigarettes in remission     Plan:  -Cardiology and pulmonology consulted and following, appreciate assistance and recommendations in the care of this patient.  -Continue Zosyn, follow-up infectious work-up and tailoring biotics accordingly  -Hold Eliquis because patient is having bronchoscopy  -Trend troponins and EKGs  -Continue atorvastatin, Gravol and aspirin  -Echocardiogram ordered  -Continue nebulizers and bronchopulmonary hygiene protocol  -Speech therapy consulted, diet per speech  -Continue supplemental O2 to maintain sats greater than 90%,  wean as tolerated  -Will monitor electrolytes and renal function with BMP and magnesium level in the AM  -Will monitor WBC and Hgb with CBC in the AM  -Clinical course will dictate further management     DVT Prophylaxis: Eliquis  Diet: Mechanical soft, chopped, nectar thick liquids  Dispo: Return to nursing home when appropriate for discharge  Code Status: DNR     Personally reviewed patients labs and imaging, discussed with patient and nurse at bedside. Discussed case with the following consultants: Cardiology and Pulmonology.     Part of this note may be an electronic transcription/translation of spoken language to printed text using the Dragon dictation system.    DVT prophylaxis:  No DVT prophylaxis order currently exists.    CODE STATUS:   Level Of Support Discussed With: Health Care Surrogate  Code Status: No CPR  Medical Interventions (Level of Support Prior to Arrest): Full  Comments: POA son was at bedside and we discussed        Electronically signed by Tahir Rojas MD, 07/15/21, 3:19 PM EDT.

## 2021-07-15 NOTE — PLAN OF CARE
Goal Outcome Evaluation:     FUNCTIONAL ASSESSMENT INSTRUMENT: Patient currently scored a level 5 of 7 on Functional Communication Measures for swallowing indicating a 20-39% limitation in function.    ASSESSMENT/ PLAN OF CARE:  Pt presents with limitations, noted below, that impede his ability to swallow safely. The skills of a therapist will be required to safely and effectively implement the following treatment plan to restore maximal level of function.    PROBLEMS:  1.  Dysphagia  TREATMENT: Dysphagia therapy to ensure diet tolerance, advance to least restrictive diet and analyze for aspiration risk.    FREQUENCY/DURATION: Twice daily 5 times a week    REHAB POTENTIAL:  Pt has good rehab potential.  The following limitations may influence improvement/ length of tx medical status.    RECOMMENDATIONS:   1.   DIET: Mechanical soft diet-nectar thick liquids    2.  POSITION: 90 degrees upright for all intake    3.  COMPENSATORY STRATEGIES: Slow rate, small bites/drinks, encourage frequent rest periods of short of breath during male, oral meds whole in applesauce    Patient could benefit from modified barium swallow study.    Pt/responsible party agrees with plan of care and has been informed of all alternatives, risks and benefits.

## 2021-07-15 NOTE — PLAN OF CARE
Goal Outcome Evaluation:   Spoke with patient when admitted from ED, son at bedside, daughter and grandson. Patient completely A/Ox4. Was joking around with family. Around 0000 patient had warming blanket off, and patient was hard to wake up, but came to and stated to Leave him alone, he wasn't taking medication, not gettng nosed swab. This was a change from earlier. Contacted Vinita Talamantes in ED pt would not make eye contact with provider and was completely confused in ED. Vinita seen patient and noted no change with patient from ED. Patients temp was taken at that time 96.2 Rectally. Heating blanket back on patient. Patients temp was 97.5 Rectally this morning, patient A/Ox4 now, and had no collection of last night and what was stated. MD aware.

## 2021-07-15 NOTE — CASE MANAGEMENT/SOCIAL WORK
Discharge Planning Assessment   [facility]     Patient Name: Alexander Rouse  MRN: [id number]  Today's Date: 7/15/2021    Admit Date: 7/14/2021    Discharge Needs Assessment     Row Name 07/15/21 1321       Living Environment    Lives With  alone    Unique Family Situation  Family had been staying with Pt 24/7 for the last two years    Current Living Arrangements  home/apartment/condo    Duration at Residence  30 years    Primary Care Provided by  child(john)    Provides Primary Care For  no one    Family Caregiver if Needed  child(john), adult    Family Caregiver Names  Zeke Rouse (POA)    Able to Return to Prior Arrangements  yes    Living Arrangement Comments  Pt will return to [place]       Resource/Environmental Concerns    Resource/Environmental Concerns  none    Transportation Concerns  car, none       Transition Planning    Transportation Anticipated  family or friend will provide       Discharge Needs Assessment    Equipment Currently Used at Home  wheelchair;walker, standard;commode;shower chair;oxygen    Anticipated Changes Related to Illness  inability to care for self    Discharge Facility/Level of Care Needs  nursing facility, skilled    Discharge Coordination/Progress  Pt will return to [place] at discharge.        Discharge Plan     Row Name 07/15/21 6055       Plan    Plan  Pt will return to [place] at discharge.        Continued Care and Services - Admitted Since 7/14/2021     Destination     Service Provider Request Status Selected Services Address Phone Fax [facility]  [facility] - Request Sent N/A [address] [facility] [address] [phone] --                Demographic Summary     Row Name 07/15/21 1315       General Information    Preferred Language  English        Functional Status     Row Name 07/15/21 1318       Functional Status    Usual Activity Tolerance  fair    Current Activity Tolerance  fair    Functional Status  Comments  For the last few years family had stayed with Pt 24/7.       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        Psychosocial    No documentation.       Abuse/Neglect    No documentation.       Legal     Row Name 07/15/21 1320       Financial/Legal    Source of Income  social security    Application for Public Assistance  not applied       Legal    Criminal Activity/Legal Involvement  none        Substance Abuse    No documentation.       Patient Forms    No documentation.           Christine Malin

## 2021-07-15 NOTE — PLAN OF CARE
Goal Outcome Evaluation:            Pt has bear hugger due to hypothermia on admission. Most recent temp 98.6 Pt to have bronchoscopy tomorrow 7/16/21. NPO after midnight. External male catheter placed to suction due to frequent incontinence and inability to get out of bed. On zosyn IVPB. Speech changed diet to nectar thick liquids and mechanical soft.

## 2021-07-16 NOTE — PLAN OF CARE
Goal Outcome Evaluation:   Thania cornell removed, temp wnl, pt npo since midnight. To have bronchoscopy done today. No complaints or acute changes through night, now contact isolation for mrsa of the nares. Jeffrey Quigley RN

## 2021-07-16 NOTE — OP NOTE
Bronchoscopy with endobronchial ultrasound, transbronchial lung biopsies, brushings, bronchoalveolar lavage, bronchial washings     Pre-Operative Diagnosis: Lung abscess versus endobronchial lesion, mucous plugging     Post-Operative Diagnosis: No evidence of adenopathy or tumor.  Stricture of medial segmental bronchus of right lower lobe bronchus.  Mucous plugging and abscess of right lower lobe bronchus.     Anesthesia: MAC anesthesia     Timeout performed     Procedure Details: The patient was consented for the procedure with all risk and benefit of the procedure explained in detail.  He was given the opportunity to ask questions and all concerns were answered. The bronchoscope was inserted into the main airway via the oral cavity. An anatomical survey was done of the main airways and the subsegmental bronchus.      Findings: First the endobronchial ultrasound was inserted into the airway. Using ultrasound, the following lymph nodes were identified and found to be adequate for samplin, 4R, 11 R.  These lymph nodes appeared minimal in size and appearance.  No adenopathy or endobronchial lesions or tumors noted via ultrasound.  There was no lymph nodes large enough to successfully sample.  Ultrasound was then switched out for a therapeutic bronchoscope.     The vocal cords were normal in appearance and movement with abduction and adduction without difficulty. The trachea was normal in caliber and had no mucosal abnormalities. The left tracheobronchial tree appeared anatomically normal with no mucosal abnormalities. The right tracheobronchial tree appeared anatomically normal with evidence of narrowing at the medial segmental bronchus of the right lower lobe bronchus.  There was difficulty passing the scope into this segment.  There was a large pus At this area and significant thick viscous purulent secretions obstructing the right lower lobe bronchus, right mainstem bronchus and distal trachea.  All secretions  were suctioned and removed.  No endobronchial lesions were noted.  Transbronchial lung biopsies were performed x6 at the right lower lobe of lung.  Brushings x2 were performed at the right lower lobe bronchus. A bronchoalveolar lavage was performed using 3x60 cc aliquots of normal saline  instilled into the right lower lobe bronchus then aspirated back. There was 32 cc purulent blood-tinged fluid in return.  Bronchial washings were collected.     Findings:    Normal, tiny lymph nodes noted via endobronchial ultrasound.  No endobronchial lesions noted  Large crust Obstructing medial segmental bronchus of right lower lobe bronchus.  Also significant purulent mucus plugging of right lower lobe bronchus, right mainstem bronchus and distal trachea.  No endobronchial lesions noted.     Estimated Blood Loss: Less than 10 cc     Specimens:  Transbronchial lung biopsies of right lower lobe of lung  Brushings right lower lobe bronchus  Bronchoalveolar lavage of right lower lobe bronchus  Bronchial washings     Complications: None; patient tolerated the procedure well.     Disposition: Return to floor     Recommendations: Follow-up test results.  Maximize airway clearance.     Patient tolerated the procedure well.     Electronically signed by Artur Randolph MD, 07/16/21, 9:52 AM EDT.

## 2021-07-16 NOTE — PROGRESS NOTES
Pulmonary / Critical Care Progress Note      Patient Name: Alexander Rouse  : 1935  MRN: [id number]  Attending:  Tahir Rojas MD  Date of admission: 2021    Subjective   Subjective   Follow-up for abnormal chest CT, pneumonia, hypoxia    Over past 24 hours:  On 1 L of oxygen.  MRSA PCR positive  Weakness and fatigue unchanged.  No recent fevers  Does have dry cough with no dyspnea  No chest pain or hemoptysis  No nausea, fevers or chills   again discussed bronchoscopy with patient.  He is agreeable to having it done today.      Review of Systems  General: Fatigue and weakness, otherwise denied complaints  Cardiovascular:  Denied complaints  Respiratory: Cough, otherwise denied complaints  Gastrointestinal: Denied complaints        Objective   Objective     Vitals:   Temp:  [97.2 °F (36.2 °C)-99.1 °F (37.3 °C)] 97.2 °F (36.2 °C)  Heart Rate:  [67-94] 80  Resp:  [14-22] 17  BP: ()/(45-63) 121/61  Flow (L/min):  [1-10] 1    Physical Exam   Vital Signs Reviewed   WDWN, Alert, NAD.    HEENT:  PERRL, EOMI.  OP, nares clear, no sinus tenderness  Neck:  Supple, no JVD, no thyromegaly  Chest:  good aeration, diminished with coarse crackles and rhonchi right base, tympanic to percussion bilaterally, no work of breathing noted  CV: RRR, no MGR, pulses 2+, equal.  Abd:  Soft, NT, ND, + BS, no HSM  EXT:  no clubbing, no cyanosis, no edema  Neuro:  A&Ox3, CN grossly intact, no focal deficits.  Skin: No rashes or lesions noted    Result Review    Result Review:  I have personally reviewed the results from the time of this admission to 2021 09:07 EDT and agree with these findings:  [x]  Laboratory  [x]  Microbiology  [x]  Radiology  []  EKG/Telemetry   []  Cardiology/Vascular   []  Pathology  []  Old records  []  Other:  Most notable findings include: Chest CT personally reviewed.  MRSA PCR positive.  Rest of infectious work-up so far pending/negative.    Assessment/Plan   Assessment / Plan     Active  Hospital Problems:  Active Hospital Problems    Diagnosis    • **Troponin level elevated    • Hypothermia    • Altered mental status    • Abnormal chest CT      Added automatically from request for surgery [id number]     • Cavitary lesion of lung      Added automatically from request for surgery [id number]           Impression:  Abnormal chest CT concerning for endobronchial lesion versus mucous plug with right lower lobe lung abscess  Right lower lobe postobstructive pneumonia with lung abscess from unspecified organism.  Question MRSA  Sepsis  Hypothermia  COPD without exacerbation  Therapeutic drug monitoring of antibiotics  Question aspiration into airways  History of non-small cell lung cancer/adenocarcinoma status post right upper lobe wedge resection in 2012  Tobacco abuse of cigarettes in remission     Plan:  Differential diagnoses mucous plugging with postobstructive pneumonia/lung abscess versus foreign body versus endobronchial lesion/cancer.  Taking today for bronchoscopy with endobronchial ultrasound/fine-needle aspiration, brushings, biopsies, bronchoalveolar lavage  I have discussed the risks of the procedure with the patient including pneumothorax, hemothorax, bleeding, hypoxia, required mechanical ventilation and death. The patient recognizes these findings, acknowledges these findings and is agreeable to the procedure.  Can restart Eliquis tomorrow  Continue Zosyn, day 2.  Add Zyvox given MRSA PCR positive.  May need as long as 6 weeks of antibiotics for possible lung abscess.  Minimum 2 weeks for postobstructive pneumonia  Continue nebulizers and bronchopulmonary hygiene  Diet per speech therapy.  Appreciate assistance  Wean O2 to keep SPO2 greater than 90%    DVT prophylaxis:  No DVT prophylaxis order currently exists.    CODE STATUS:   Level Of Support Discussed With: Health Care Surrogate  Code Status: No CPR  Medical Interventions (Level of Support Prior to Arrest): Full  Comments: POA son was at  bedside and we discussed      Microbiology, imaging, labs, notes and medications personally reviewed.  Discussed with primary and endoscopy staff    Electronically signed by Artur Randolph MD, 07/16/21, 9:09 AM EDT.

## 2021-07-16 NOTE — PLAN OF CARE
Goal Outcome Evaluation:  Plan of Care Reviewed With: patient        Progress: improving  Outcome Summary: Patient presents with limitations affecting strength, activity tolerance, and balance impacting patient's ability to return home safely and independently.  The skills of a therapist will be required to safely and effectively implement the following treatment plan to restore maximal level of function

## 2021-07-16 NOTE — ANESTHESIA POSTPROCEDURE EVALUATION
Patient: Alexanedr Rouse    Procedure Summary     Date: 07/16/21 Room / Location: [facility] [place] ENDOSCOPY 3 / [facility]    Anesthesia Start: 0916 Anesthesia Stop: 1003    Procedure: BRONCHOSCOPY WITH ENDOBRONCHIAL ULTRASOUND/BRONCHIAL ALVEOLAR LAVAGE/BIOPSIES/BRUSHINGS/WASHINGS (N/A Bronchus) Diagnosis:       Abnormal chest CT      Cavitary lesion of lung      (Abnormal chest CT [R93.89])      (Cavitary lesion of lung [J98.4])    Surgeons: Artur Randolph MD Provider: Cher Julien MD    Anesthesia Type: general ASA Status: 4          Anesthesia Type: general    Vitals  Vitals Value Taken Time   /62 07/16/21 1012   Temp 35.7 °C (96.3 °F) 07/16/21 1005   Pulse 85 07/16/21 1015   Resp 21 07/16/21 1010   SpO2 100 % 07/16/21 1015   Vitals shown include unvalidated device data.        Post Anesthesia Care and Evaluation    Patient location during evaluation: bedside  Patient participation: complete - patient participated  Level of consciousness: awake  Pain score: 0  Pain management: adequate  Airway patency: patent  Anesthetic complications: No anesthetic complications  PONV Status: none  Cardiovascular status: acceptable and stable  Respiratory status: acceptable and room air  Hydration status: acceptable    Comments: An Anesthesiologist personally participated in the most demanding procedures (including induction and emergence if applicable) in the anesthesia plan, monitored the course of anesthesia administration at frequent intervals and remained physically present and available for immediate diagnosis and treatment of emergencies.

## 2021-07-16 NOTE — ANESTHESIA PREPROCEDURE EVALUATION
Anesthesia Evaluation     Patient summary reviewed and Nursing notes reviewed   no history of anesthetic complications:  NPO Solid Status: > 8 hours  NPO Liquid Status: > 2 hours           Airway   Mallampati: II  Dental    (+) poor dentition and upper dentures    Pulmonary    (+) lung cancer, COPD, asthma,  Rhonchi: bilateral.  Cardiovascular - normal exam  Exercise tolerance: poor (<4 METS)    Patient on routine beta blocker and Beta blocker given within 24 hours of surgery    (+) hyperlipidemia,       Neuro/Psych  (+) TIA,     GI/Hepatic/Renal/Endo - negative ROS     Musculoskeletal (-) negative ROS    Abdominal    Substance History - negative use     OB/GYN negative ob/gyn ROS         Other      history of cancer                    Anesthesia Plan    ASA 4     general   total IV anesthesia(Patient understands anesthesia not responsible for dental damage. Suspension of DNR discussed during procedure. Risk of respiratory failure discussed.)  intravenous induction     Anesthetic plan, all risks, benefits, and alternatives have been provided, discussed and informed consent has been obtained with: patient.  Use of blood products discussed with patient .   Plan discussed with CRNA.

## 2021-07-16 NOTE — PROGRESS NOTES
[facility]     Cardiology Progress Note    Patient Name: Alexander Rouse  : 1935  MRN: [id number]  Primary Care Physician:  Sharif Singer MD  Date of admission: 2021    Subjective   Subjective   CC: Altered mental status, lethargy    HPI:    Alexander Rouse is a 85 y.o. male with history of admitted with altered mental status, lethargy.  More alert today.  No chest pain or shortness of breath.    Objective     ROS:  Respiratory: No Cough, No Dyspnea  Cardiovascular: No CP Classic for Angina    Vitals:   Temp:  [97.9 °F (36.6 °C)-99.1 °F (37.3 °C)] 98.3 °F (36.8 °C)  Heart Rate:  [67-94] 72  Resp:  [14-22] 16  BP: ()/(45-63) 97/45  Flow (L/min):  [1-10] 10  Physical Exam    Constitutional: Awake, alert, No acute distress   Eyes: PERRLA, sclerae anicteric, no conjunctival injection   HENT: NCAT, mucous membranes moist   Neck: Supple, no thyromegaly, no lymphadenopathy, trachea midline   Respiratory: Clear to auscultation bilaterally, nonlabored respirations    Cardiovascular: RRR, no murmurs, rubs, or gallops, palpable pedal pulses bilaterally   Musculoskeletal: No bilateral ankle edema, no clubbing or cyanosis to extremities   Neurologic: Oriented x 3, strength symmetric in all extremities, speech clear  Current medications:  arformoterol, 15 mcg, Nebulization, BID - RT  aspirin, 81 mg, Oral, Daily  atorvastatin, 40 mg, Oral, Nightly  budesonide, 0.5 mg, Nebulization, BID - RT  carvedilol, 3.125 mg, Oral, Q12H  furosemide, 40 mg, Oral, Daily  ipratropium-albuterol, 3 mL, Nebulization, 4x Daily - RT  Linezolid, 600 mg, Intravenous, BID  piperacillin-tazobactam, 3.375 g, Intravenous, Q8H  potassium chloride, 40 mEq, Oral, Once  sodium chloride, 10 mL, Intravenous, Q12H      Current IV drips:  lactated ringers, 30 mL/hr  Pharmacy to Dose Zosyn,          Result Review    Result Review:  I have personally reviewed the results from the time of this admission to 2021 08:47 EDT and agree  with these findings:  [x]  Laboratory  []  Microbiology  [x]  Radiology  [x]  EKG  [x]  Cardiology/Vascular   CBC    CBC 7/14/21 7/15/21 7/16/21   WBC 9.58 13.80 (A) 12.24 (A)   RBC 4.46 4.20 3.68 (A)   Hemoglobin 10.5 (A) 10.1 (A) 8.7 (A)   Hematocrit 34.8 (A) 32.8 (A) 28.4 (A)   MCV 78.0 (A) 78.1 (A) 77.2 (A)   MCH 23.5 (A) 24.0 (A) 23.6 (A)   MCHC 30.2 (A) 30.8 (A) 30.6 (A)   RDW 21.7 (A) 22.0 (A) 21.7 (A)   Platelets 231 237 202   (A) Abnormal value            CMP    CMP 7/14/21 7/14/21 7/15/21 7/16/21    1503 1542     Glucose 147 (A) 139 (A) 87 89   BUN 30 (A)  27 (A) 28 (A)   Creatinine 0.97  1.02 0.98   eGFR Non African Am 74  69 73   Sodium 140 136.6 144 139   Potassium 4.4  3.8 3.5   Chloride 98  101 99   Calcium 8.8  8.9 7.9 (A)   Albumin 3.80  3.40 (A)    Total Bilirubin 0.7  0.9    Alkaline Phosphatase 103  103    AST (SGOT) 18  15    ALT (SGPT) 23  19    (A) Abnormal value                       Telemetry reviewed  Cardiac Medications Reviewed.    Assessment/Plan   Assessment / Plan   1.  Pneumonia  2.  COPD  3.  Sepsis  4.  Coronary disease  5.  Chronic diastolic heart failure  6.  Slightly increased troponin secondary to above.  Plan:  Continue antibiotics and current management for pneumonia  Continue home medications.  Stable cardiac  We will sign off and see if needed.    Electronically signed by López Ho MD, 07/16/21, 8:47 AM EDT.

## 2021-07-16 NOTE — PROGRESS NOTES
[facility]   Hospitalist Progress Note  Date: 2021  Patient Name: Alexander Rouse  : 1935  MRN: [id number]  Date of admission: 2021  Consultants:   -Pulmonology: Dr. Artur Randolph  -Cardiology: Dr. López Ho    Subjective   Subjective     Chief Complaint: Altered mental status, lethargy    Summary:   Alexander Rouse is a 85 y.o. 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  Upon presentation to the ED patient was hypothermic, chest x-ray did not show any acute concerning abnormalities and urinalysis was bland.  Due to concern for infection patient was started on Zosyn and infectious work-up started.  CT of the chest obtained and was abnormal, pulmonology consulted to assist in care.  Troponins were slightly elevated admission, cardiology consulted to assist in care.    Interval Followup:   No acute events overnight.  Patient had bronchoscopy done today (2021), significant findings included large crust obstructing medial segmental bronchus of the right lower lobe bronchus and purulent mucus plugging of right lower lobe bronchus, right mainstem bronchus and distal trachea.  Cultures obtained.  Patient denied any chest pain, abdominal pain, nausea or vomiting.  Stated he felt like his shortness of air was stable.  Patient MRSA screen returned positive.  Nursing with no additional acute issues to report.    Antibiotics:   -Linezolid  -Zosyn    Review of Systems   10 point review of systems performed and negative unless stated otherwise under subjective.    Objective   Objective     Vitals:   Temp:  [95.6 °F (35.3 °C)-99.1 °F (37.3 °C)] 97 °F (36.1 °C)  Heart Rate:  [67-94] 74  Resp:  [14-26] 17  BP: ()/(45-70) 112/56  Flow (L/min):  [1-10]  2  Physical Exam   Gen: No acute distress, Conversant, Pleasant, sitting up in chair at bedside, resting comfortably  HEENT: MMM, Atraumatic  Neck: Supple, Trachea midline  Resp: Good aeration, diminished breath sounds bilaterally, right basilar crackles and rhonchi, equal chest rise bilaterally, no increased work of breathing  Card: RRR, No m/r/g  Abd: Soft, Nontender, Nondistended, + bowel sounds  Ext: No cyanosis, No clubbing  Neuro: CN II-XII grossly intact, No focal deficits appreciated  Psych: AAO x 3, Normal mood, Normal affect    Result Review    Result Review:  I have personally reviewed the results from the time of this admission to 7/16/2021 15:56 EDT and agree with these findings:  [x]  Laboratory: Troponin flat.  Creatinine and electrolytes stable.  WBC stable.  Hemoglobin slightly decreased.  [x]  Microbiology: Blood cultures with no growth to date.  MRSA screen positive.  []  Radiology:   [x]  EKG/Telemetry: NSVT.  PVCs.  Sinus rhythm.  []  Cardiology/Vascular:    []  Pathology:  []  Old records:  []  Other:    Assessment/Plan   Assessment / Plan     Assessment:  Right lower lobe postobstructive pneumonia with lung abscess from unspecified organism  Abnormal chest CT concerning for endobronchial lesion versus mucous plug with right lower lobe lung abscess  Sepsis due to above  Hypothermia  COPD without exacerbation  Possible aspiration  Elevated troponin  History of diastolic heart failure, not acutely exacerbated  History of tobacco abuse of cigarettes in remission     Plan:  -Cardiology and pulmonology consulted and following, appreciate assistance and recommendations in the care of this patient.   -Continue Zosyn (Day 2)  -MRSA screen returned positive, will add linezolid  -Follow-up infectious work-up and tailor antibiotic therapy accordingly  -Per pulmonology patient can resume Eliquis on 07/17/2021  -Continue atorvastatin, carvedilol and aspirin  -Continue nebulizers and bronchopulmonary hygiene  protocol  -Speech therapy consulted, diet per speech  -Continue supplemental O2 to maintain sats greater than 90%, wean as tolerated  -Monitor electrolytes and renal function with BMP and magnesium level in the AM  -Monitor WBC and Hgb with CBC in the AM  -Clinical course will dictate further management     DVT Prophylaxis: Eliquis  Diet: Mechanical soft, chopped, nectar thick liquids  Dispo: Return to nursing home when appropriate for discharge  Code Status: DNR     Personally reviewed patients labs and imaging, discussed with patient and nurse at bedside. Discussed case with the following consultants: Cardiology and Pulmonology.     Part of this note may be an electronic transcription/translation of spoken language to printed text using the Dragon dictation system.    DVT prophylaxis:  No DVT prophylaxis order currently exists.    CODE STATUS:   Level Of Support Discussed With: Health Care Surrogate  Code Status: No CPR  Medical Interventions (Level of Support Prior to Arrest): Full  Comments: POA son was at bedside and we discussed      Electronically signed by Tahir Rojas MD, 07/16/21, 3:56 PM EDT.

## 2021-07-16 NOTE — THERAPY EVALUATION
Patient Name: Alexander Rouse  : 1935    MRN: [id number]                              Today's Date: 2021       Admit Date: 2021    Visit Dx:     ICD-10-CM ICD-9-CM   1. Cavitary lesion of lung  J98.4 518.89   2. Dysphagia, oropharyngeal  R13.12 787.22   3. Abnormal chest CT  R93.89 793.2   4. Decreased activities of daily living (ADL)  Z78.9 V49.89     Patient Active Problem List   Diagnosis   • CAFL (chronic airflow limitation) (CMS/[facility])   • Cough   • Cancer of lower lobe of lung (CMS/[facility])   • Disease of larynx   • Rotator cuff tear arthropathy   • Status post complete repair of rotator cuff   • Nodule of right lung   • Troponin level elevated   • Hypothermia   • Altered mental status   • Abnormal chest CT   • Cavitary lesion of lung     Past Medical History:   Diagnosis Date   • Apnea, sleep    • Asthma    • COPD (chronic obstructive pulmonary disease) (CMS/[facility])    • Hyperlipidemia    • TIA (transient ischemic attack)      Past Surgical History:   Procedure Laterality Date   • CORONARY ANGIOPLASTY WITH STENT PLACEMENT     • LUNG CANCER SURGERY     • SHOULDER SURGERY Bilateral      General Information     Row Name 21 0842 21 0832       OT Time and Intention    Document Type  therapy note (daily note)  -PG  evaluation  -PG    Mode of Treatment  individual therapy;occupational therapy  -PG  individual therapy;occupational therapy  -PG    Row Name 21 0832          General Information    Patient Profile Reviewed  yes  -PG     Prior Level of Function  mod assist:;ADL's  -PG     Existing Precautions/Restrictions  fall  -PG     Barriers to Rehab  none identified  -PG     Row Name 21 0832          Occupational Profile    Reason for Services/Referral (Occupational Profile)  Decreased ADL performance  -PG     Row Name 21 0832          Living Environment    Lives With  -- Patient currently at [facility] nursing [facility] re[facility]  -PG     Row Name 21 0832          Cognition     Orientation Status (Cognition)  oriented to;person;verbal cues/prompts needed for orientation  -PG     Row Name 07/16/21 0832          Safety Issues, Functional Mobility    Safety Issues Affecting Function (Mobility)  ability to follow commands;awareness of need for assistance  -PG     Impairments Affecting Function (Mobility)  cognition;endurance/activity tolerance;strength  -PG     Cognitive Impairments, Mobility Safety/Performance  sequencing abilities;problem-solving/reasoning;judgment  -PG       User Key  (r) = Recorded By, (t) = Taken By, (c) = Cosigned By    Initials Name Provider Type    PG Akbar Brambila OT Occupational Therapist          Mobility/ADL's     Row Name 07/16/21 0842 07/16/21 0833       Transfers    Transfers  sit-stand transfer  -PG  sit-stand transfer  -PG    Comment (Transfers)  Recliner transfer contact-guard/minimal assist and verbal cues for safety  -PG  Recliner transfer contact-guard/minimal assist  -PG    Sit-Stand [place] (Transfers)  contact guard;minimum assist (75% patient effort);verbal cues  -PG  contact guard;minimum assist (75% patient effort)  -PG    Row Name 07/16/21 0842 07/16/21 0833       Sit-Stand Transfer    Assistive Device (Sit-Stand Transfers)  walker, front-wheeled  -PG  walker, front-wheeled  -PG    Row Name 07/16/21 0833          Activities of Daily Living    BADL Assessment/Intervention  -- Upper body dressing and bathing minimal, lower body self-care maximal, toileting dependent  -PG       User Key  (r) = Recorded By, (t) = Taken By, (c) = Cosigned By    Initials Name Provider Type    PG Akbar Brambila OT Occupational Therapist        Obj/Interventions     Row Name 07/16/21 0835          Sensory Assessment (Somatosensory)    Sensory Assessment (Somatosensory)  sensation intact  -PG     Row Name 07/16/21 0835          Vision Assessment/Intervention    Visual Impairment/Limitations  unable/difficult to assess  -PG     Row Name 07/16/21 0835          Range of  Motion Comprehensive    General Range of Motion  no range of motion deficits identified  -PG     Row Name 07/16/21 0835          Strength Comprehensive (MMT)    Comment, General Manual Muscle Testing (MMT) Assessment  Bilateral upper extremity strength 4 - to 4/5  -PG     Row Name 07/16/21 0835          Motor Skills    Motor Skills  coordination;functional endurance  -PG     Functional Endurance  Fair minus  -PG       User Key  (r) = Recorded By, (t) = Taken By, (c) = Cosigned By    Initials Name Provider Type    PG Akbar Brambila OT Occupational Therapist        Goals/Plan     Row Name 07/16/21 0836          Transfer Goal 1 (OT)    Activity/Assistive Device (Transfer Goal 1, OT)  transfers, all  -PG     [place] Level/Cues Needed (Transfer Goal 1, OT)  standby assist  -PG     Time Frame (Transfer Goal 1, OT)  10 days;long term goal (LTG)  -PG     Row Name 07/16/21 0836          Bathing Goal 1 (OT)    Activity/Device (Bathing Goal 1, OT)  bathing skills, all  -PG     [place] Level/Cues Needed (Bathing Goal 1, OT)  minimum assist (75% or more patient effort)  -PG     Time Frame (Bathing Goal 1, OT)  10 days;long term goal (LTG)  -PG     Row Name 07/16/21 0836          Dressing Goal 1 (OT)    Activity/Device (Dressing Goal 1, OT)  dressing skills, all  -PG     [place]/Cues Needed (Dressing Goal 1, OT)  verbal cues required  -PG     Time Frame (Dressing Goal 1, OT)  10 days;long term goal (LTG)  -PG     Row Name 07/16/21 0836          Toileting Goal 1 (OT)    Activity/Device (Toileting Goal 1, OT)  toileting skills, all  -PG     [place] Level/Cues Needed (Toileting Goal 1, OT)  minimum assist (75% or more patient effort)  -PG     Time Frame (Toileting Goal 1, OT)  10 days;long term goal (LTG)  -PG     Row Name 07/16/21 0836          Grooming Goal 1 (OT)    Activity/Device (Grooming Goal 1, OT)  grooming skills, all  -PG     [place] (Grooming Goal 1, OT)  contact guard assist  -PG     Time Frame  (Grooming Goal 1, OT)  long term goal (LTG);10 days  -PG     Row Name 07/16/21 0836          Strength Goal 1 (OT)    Strength Goal 1 (OT)  Patient will improve bilateral upper extremity strength to 4+/5 to support ADL activities  -PG     Time Frame (Strength Goal 1, OT)  10 days;long term goal (LTG)  -PG     Row Name 07/16/21 0836          Therapy Assessment/Plan (OT)    Planned Therapy Interventions (OT)  activity tolerance training;BADL retraining;patient/caregiver education/training;transfer/mobility retraining;strengthening exercise  -PG       User Key  (r) = Recorded By, (t) = Taken By, (c) = Cosigned By    Initials Name Provider Type    PG Akbar Brambila, ATTILA Occupational Therapist        Clinical Impression     [facility] Name 07/16/21 0836          Pain Assessment    Additional Documentation  Pain Scale: Numbers Pre/Post-Treatment (Group)  -PG     Row Name 07/16/21 0836          Pain Scale: Numbers Pre/Post-Treatment    Pretreatment Pain Rating  0/10 - no pain  -PG     Posttreatment Pain Rating  0/10 - no pain  -PG     Row Name 07/16/21 0836          Plan of Care Review    Plan of Care Reviewed With  patient  -PG     Progress  improving  -PG     Outcome Summary  Patient presents with limitations affecting strength, activity tolerance, and balance impacting patient's ability to return home safely and independently.  The skills of a therapist will be required to safely and effectively implement the following treatment plan to restore maximal level of function  -PG     Row Name 07/16/21 0836          Therapy Assessment/Plan (OT)    Patient/Family Therapy Goal Statement (OT)  Patient would like to return home with his family  -PG     Rehab Potential (OT)  good, to achieve stated therapy goals  -PG     Criteria for Skilled Therapeutic Interventions Met (OT)  yes;meets criteria;skilled treatment is necessary  -PG     Therapy Frequency (OT)  5 times/wk  -PG     Row Name 07/16/21 0836          Therapy Plan Review/Discharge  Plan (OT)    Anticipated Discharge Disposition (OT)  sub acute care setting  -PG       User Key  (r) = Recorded By, (t) = Taken By, (c) = Cosigned By    Initials Name Provider Type    Akbar Darby OT Occupational Therapist        Outcome Measures     Row Name 07/16/21 0839          How much help from another is currently needed...    Putting on and taking off regular lower body clothing?  1  -PG     Bathing (including washing, rinsing, and drying)  1  -PG     Toileting (which includes using toilet bed pan or urinal)  2  -PG     Putting on and taking off regular upper body clothing  2  -PG     Taking care of personal grooming (such as brushing teeth)  3  -PG     Eating meals  4  -PG     AM-PAC 6 Clicks Score (OT)  13  -PG     Row Name 07/16/21 0755 07/15/21 2200       How much help from another person do you currently need...    Turning from your back to your side while in flat bed without using bedrails?  3  -AG  2  -HG    Moving from lying on back to sitting on the side of a flat bed without bedrails?  3  -AG  2  -HG    Moving to and from a bed to a chair (including a wheelchair)?  2  -AG  1  -HG    Standing up from a chair using your arms (e.g., wheelchair, bedside chair)?  2  -AG  1  -HG    Climbing 3-5 steps with a railing?  1  -AG  1  -HG    To walk in hospital room?  1  -AG  1  -HG    AM-PAC 6 Clicks Score (PT)  12  -AG  8  -HG    Row Name 07/16/21 0839          Functional Assessment    Outcome Measure Options  AM-PAC 6 Clicks Daily Activity (OT);Optimal Instrument  -PG     Row Name 07/16/21 0839          Optimal Instrument    Optimal Instrument  Optimal - 3  -PG     Bending/Stooping  4  -PG     Standing  2  -PG     Reaching  2  -PG     From the list, choose the 3 activities you would most like to be able to do without any difficulty  Bending/stooping;Standing;Reaching  -PG     Total Score Optimal - 3  8  -PG       User Key  (r) = Recorded By, (t) = Taken By, (c) = Cosigned By    Initials Name Provider  Type    AG Sailaja Willoughby, RN Registered Nurse    Jeffrey Davis RN Registered Nurse    Akbar Darby OT Occupational Therapist        Occupational Therapy Education                 Title: PT OT SLP Therapies (In Progress)     Topic: Occupational Therapy (In Progress)     Point: ADL training (In Progress)     Description:   Instruct learner(s) on proper safety adaptation and remediation techniques during self care or transfers.   Instruct in proper use of assistive devices.              Learning Progress Summary           Patient Acceptance, E,D, NR by PG at 7/16/2021 0841                   Point: Home exercise program (In Progress)     Description:   Instruct learner(s) on appropriate technique for monitoring, assisting and/or progressing therapeutic exercises/activities.              Learning Progress Summary           Patient Acceptance, E,D, NR by PG at 7/16/2021 0841                   Point: Precautions (In Progress)     Description:   Instruct learner(s) on prescribed precautions during self-care and functional transfers.              Learning Progress Summary           Patient Acceptance, E,D, NR by PG at 7/16/2021 0841                   Point: Body mechanics (In Progress)     Description:   Instruct learner(s) on proper positioning and spine alignment during self-care, functional mobility activities and/or exercises.              Learning Progress Summary           Patient Acceptance, E,D, NR by PG at 7/16/2021 0841                               User Key     Initials Effective Dates Name Provider Type Discipline     06/16/21 -  Akbar Brambila OT Occupational Therapist OT              OT Recommendation and Plan  Planned Therapy Interventions (OT): activity tolerance training, BADL retraining, patient/caregiver education/training, transfer/mobility retraining, strengthening exercise  Therapy Frequency (OT): 5 times/wk  Plan of Care Review  Plan of Care Reviewed With: patient  Progress:  improving  Outcome Summary: Patient presents with limitations affecting strength, activity tolerance, and balance impacting patient's ability to return home safely and independently.  The skills of a therapist will be required to safely and effectively implement the following treatment plan to restore maximal level of function     Time Calculation:   Time Calculation- OT     Row Name 07/16/21 0844             Time Calculation- OT    OT Received On  07/16/21  -PG      OT Goal Re-Cert Due Date  07/25/21  -PG         Timed Charges    94178 - OT Therapeutic Activity Minutes  8  -PG         Untimed Charges    OT Eval/Re-eval Minutes  35  -PG         Total Minutes    Timed Charges Total Minutes  8  -PG      Untimed Charges Total Minutes  35  -PG       Total Minutes  43  -PG        User Key  (r) = Recorded By, (t) = Taken By, (c) = Cosigned By    Initials Name Provider Type    PG Akbar Brambila OT Occupational Therapist        Therapy Charges for Today     Code Description Service Date Service Provider Modifiers Qty    07080308677  OT THERAPEUTIC ACT EA 15 MIN 7/16/2021 Akbar Brambila OT GO 1    74492773666 HC OT EVAL LOW COMPLEXITY 3 7/16/2021 Akbar Brambila OT GO 1               Akbar Brambila OT  7/16/2021

## 2021-07-17 NOTE — THERAPY EVALUATION
Acute Care - Physical Therapy Initial Evaluation  [facility]     Patient Name: Alexander Rouse  : 1935  MRN: [id number]  Today's Date: 2021      Admit date: 2021     Referring Physician: Tahir Rojas MD     Visit diagnosis    ICD-10-CM ICD-9-CM   1. Cavitary lesion of lung  J98.4 518.89   2. Dysphagia, oropharyngeal  R13.12 787.22   3. Abnormal chest CT  R93.89 793.2   4. Decreased activities of daily living (ADL)  Z78.9 V49.89   5. Elevated troponin  R77.8 790.6   6. Difficulty walking  R26.2 719.7        Active Hospital Problems    Diagnosis  POA   • **Troponin level elevated [R77.8]  Yes   • Hypothermia [T68.XXXA]  Yes   • Altered mental status [R41.82]  Yes   • Abnormal chest CT [R93.89]  Yes     Added automatically from request for surgery [id number]     • Cavitary lesion of lung [J98.4]  Yes     Added automatically from request for surgery [id number]          SurgeryDate:2021 - 2021   Procedure(s) (LRB):  BRONCHOSCOPY WITH ENDOBRONCHIAL ULTRASOUND/BRONCHIAL ALVEOLAR LAVAGE/BIOPSIES/BRUSHINGS/WASHINGS (N/A)     Past Medical History:   Diagnosis Date   • Apnea, sleep    • Asthma    • COPD (chronic obstructive pulmonary disease) (CMS/HCC)    • Hyperlipidemia    • TIA (transient ischemic attack)         Past Surgical History:   Procedure Laterality Date   • CORONARY ANGIOPLASTY WITH STENT PLACEMENT     • LUNG CANCER SURGERY     • SHOULDER SURGERY Bilateral            PT Assessment (last 12 hours)      PT Evaluation and Treatment     Row Name 21 1236          Physical Therapy Time and Intention    Subjective Information  complains of;weakness  -DP     Document Type  evaluation  -DP     Mode of Treatment  individual therapy;physical therapy  -DP     Row Name 21 1236          General Information    Patient Profile Reviewed  yes  -DP     Prior Level of Function  min assist:;ADL's;independent:;gait  -DP     Equipment Currently Used at Home  power chair, (recliner lift);walker, rolling   -DP     Existing Precautions/Restrictions  fall  -DP     Row Name 07/17/21 1236          Living Environment    Current Living Arrangements  home/apartment/condo  -DP     Home Accessibility  stairs to enter home  -DP     Lives With  child(john), adult  -DP     Row Name 07/17/21 1236          Home Main Entrance    Number of Stairs, Main Entrance  two  -DP     Row Name 07/17/21 1236          Range of Motion (ROM)    Range of Motion  ROM is WFL;bilateral lower extremities  -DP     Row Name 07/17/21 1236          Strength (Manual Muscle Testing)    Strength (Manual Muscle Testing)  bilateral lower extremities 3-/5  -DP     Row Name 07/17/21 1236          Bed Mobility    Bed Mobility  supine-sit  -DP     Supine-Sit [place] (Bed Mobility)  moderate assist (50% patient effort)  -DP     Assistive Device (Bed Mobility)  bed rails;head of bed elevated  -DP     Row Name 07/17/21 1236          Transfers    Transfers  sit-stand transfer  -DP     Sit-Stand [place] (Transfers)  minimum assist (75% patient effort)  -DP     Row Name 07/17/21 1236          Sit-Stand Transfer    Assistive Device (Sit-Stand Transfers)  walker, front-wheeled  -DP     Row Name 07/17/21 1236          Gait/Stairs (Locomotion)    Gait/Stairs Locomotion  gait/ambulation assistive device  -DP     [place] Level (Gait)  contact guard;minimum assist (75% patient effort)  -DP     Assistive Device (Gait)  walker, front-wheeled  -DP     Distance in Feet (Gait)  5  -DP     Pattern (Gait)  -- shuffling  -DP     Row Name 07/17/21 1236          Plan of Care Review    Plan of Care Reviewed With  patient  -DP     Outcome Summary  Patient presents with decreased strength, transfers, and functional mobility.  Patient presents with deficits that will benefit from skilled physical therapy services.  He would also benefit from a rehab placement upon discharge from the hospital.  -DP     Row Name 07/17/21 1236          Therapy Assessment/Plan (PT)    Rehab  Potential (PT)  good, to achieve stated therapy goals  -DP     Criteria for Skilled Interventions Met (PT)  yes  -DP     Predicted Duration of Therapy Intervention (PT)  10 days  -DP     Problem List (PT)  problems related to;balance;mobility;strength  -DP     Row Name 07/17/21 1236          PT Evaluation Complexity    History, PT Evaluation Complexity  no personal factors and/or comorbidities  -DP     Examination of Body Systems (PT Eval Complexity)  total of 4 or more elements  -DP     Clinical Presentation (PT Evaluation Complexity)  stable  -DP     Clinical Decision Making (PT Evaluation Complexity)  low complexity  -DP     Overall Complexity (PT Evaluation Complexity)  low complexity  -DP       User Key  (r) = Recorded By, (t) = Taken By, (c) = Cosigned By    Initials Name Provider Type    DP Francheska Hunt, PT Physical Therapist        Physical Therapy Education                 Title: PT OT SLP Therapies (In Progress)     Topic: Physical Therapy (Done)     Point: Mobility training (Done)     Learning Progress Summary           Patient Acceptance, E,TB, VU by DP at 7/17/2021 1254                   Point: Home exercise program (Done)     Learning Progress Summary           Patient Acceptance, E,TB, VU by DP at 7/17/2021 1254                   Point: Body mechanics (Done)     Learning Progress Summary           Patient Acceptance, E,TB, VU by DP at 7/17/2021 1254                   Point: Precautions (Done)     Learning Progress Summary           Patient Acceptance, E,TB, VU by DP at 7/17/2021 1254                               User Key     Initials Effective Dates Name Provider Type Discipline    DP 06/03/21 -  Francheska Hunt, PT Physical Therapist PT              PT Recommendation and Plan  Anticipated Discharge Disposition (PT): skilled nursing facility, inpatient rehabilitation facility  Planned Therapy Interventions (PT): balance training, bed mobility training, gait training, strengthening, transfer  training  Therapy Frequency (PT): daily  Plan of Care Reviewed With: patient  Outcome Summary: Patient presents with decreased strength, transfers, and functional mobility.  Patient presents with deficits that will benefit from skilled physical therapy services.  He would also benefit from a rehab placement upon discharge from the hospital.  Outcome Measures     Row Name 07/17/21 1253             How much help from another person do you currently need...    Turning from your back to your side while in flat bed without using bedrails?  2  -DP      Moving from lying on back to sitting on the side of a flat bed without bedrails?  2  -DP      Moving to and from a bed to a chair (including a wheelchair)?  2  -DP      Standing up from a chair using your arms (e.g., wheelchair, bedside chair)?  2  -DP      Climbing 3-5 steps with a railing?  2  -DP      To walk in hospital room?  2  -DP      AM-PAC 6 Clicks Score (PT)  12  -DP         Functional Assessment    Outcome Measure Options  AM-PAC 6 Clicks Basic Mobility (PT)  -DP        User Key  (r) = Recorded By, (t) = Taken By, (c) = Cosigned By    Initials Name Provider Type    Francheska Grissom, PT Physical Therapist           Time Calculation:   PT Charges     Row Name 07/17/21 1259             Time Calculation    PT Received On  07/17/21  -DP      PT Goal Re-Cert Due Date  07/26/21  -DP         Untimed Charges    PT Eval/Re-eval Minutes  60  -DP         Total Minutes    Untimed Charges Total Minutes  60  -DP       Total Minutes  60  -DP        User Key  (r) = Recorded By, (t) = Taken By, (c) = Cosigned By    Initials Name Provider Type    Francheska Grissom PT Physical Therapist        Therapy Charges for Today     Code Description Service Date Service Provider Modifiers Qty    58609283163 HC PT EVAL LOW COMPLEXITY 4 7/17/2021 Francheska Hunt, PT GP 1          PT G-Codes  Outcome Measure Options: AM-PAC 6 Clicks Basic Mobility (PT)  AM-PAC 6 Clicks Score (PT):  12  AM-PAC 6 Clicks Score (OT): 13    Francheska Hunt, PT  7/17/2021

## 2021-07-17 NOTE — PROGRESS NOTES
Pulmonary / Critical Care Progress Note      Patient Name: Alexander Rouse  : 1935  MRN: [id number]  Attending:  Tahir Rojas MD  Date of admission: 2021    Subjective   Subjective   Follow-up for abnormal chest CT, pneumonia, hypoxia    Over past 24 hours:  On 1 L of oxygen.  MRSA PCR positive  Tolerated bronchoscopy well  Doing well  Feels breathing has improved  Feels breathing treatments are helping      Review of Systems  General: Fatigue and weakness, otherwise denied complaints  Cardiovascular:  Denied complaints  Respiratory: Cough, otherwise denied complaints  Gastrointestinal: Denied complaints        Objective   Objective     Vitals:   Temp:  [97 °F (36.1 °C)-98 °F (36.7 °C)] 97.3 °F (36.3 °C)  Heart Rate:  [69-80] 75  Resp:  [16-20] 16  BP: (110-120)/(58-74) 118/74  Flow (L/min):  [2-3] 2    Physical Exam   Reviewed and no change from the below  Vital Signs Reviewed   WDWN, Alert, NAD.    HEENT:  PERRL, EOMI.  OP, nares clear, no sinus tenderness  Neck:  Supple, no JVD, no thyromegaly  Chest:  good aeration, diminished with coarse crackles and rhonchi right base, tympanic to percussion bilaterally, no work of breathing noted  CV: RRR, no MGR, pulses 2+, equal.  Abd:  Soft, NT, ND, + BS, no HSM  EXT:  no clubbing, no cyanosis, no edema  Neuro:  A&Ox3, CN grossly intact, no focal deficits.  Skin: No rashes or lesions noted    Result Review    Result Review:  I have personally reviewed the results from the time of this admission to 2021 18:46 EDT and agree with these findings:  [x]  Laboratory  [x]  Microbiology  [x]  Radiology  []  EKG/Telemetry   []  Cardiology/Vascular   []  Pathology  []  Old records  []  Other:  Most notable findings include: Chest CT personally reviewed.  MRSA PCR positive.  Rest of infectious work-up so far pending/negative.    Assessment/Plan   Assessment / Plan     Active Hospital Problems:  Active Hospital Problems    Diagnosis    • **Troponin level elevated     • Hypothermia    • Altered mental status    • Abnormal chest CT      Added automatically from request for surgery [id number]     • Cavitary lesion of lung      Added automatically from request for surgery [id number]           Impression:  Abnormal chest CT concerning for endobronchial lesion versus mucous plug with right lower lobe lung abscess  Right lower lobe postobstructive pneumonia with lung abscess from unspecified organism.  Question MRSA  Sepsis  Hypothermia  COPD without exacerbation  Therapeutic drug monitoring of antibiotics  Question aspiration into airways  History of non-small cell lung cancer/adenocarcinoma status post right upper lobe wedge resection in 2012  Tobacco abuse of cigarettes in remission     Plan:  Bronchoscopy showed mucous plugging  PCR positive for Proteus  Doing well  Restart Eliquis  Continue Zosyn  Await cultures and sensitivities to be final  Continue oxygen  Continue nebulizers  Continue airway clearance      DVT prophylaxis:  Medical DVT prophylaxis orders are present.    CODE STATUS:   Level Of Support Discussed With: Health Care Surrogate  Code Status: No CPR  Medical Interventions (Level of Support Prior to Arrest): Full  Comments: POA son was at bedside and we discussed      Microbiology, imaging, labs, notes and medications personally reviewed.  Discussed with primary and endoscopy staff    Electronically signed by Anmol Walsh DO, 07/17/21, 6:46 PM EDT.

## 2021-07-17 NOTE — PROGRESS NOTES
[facility]   Hospitalist Progress Note  Date: 2021  Patient Name: Alexander Rouse  : 1935  MRN: [id number]  Date of admission: 2021  Consultants:   -Pulmonology: Dr. Artur Randolph  -Cardiology: Dr. López Ho    Subjective   Subjective     Chief Complaint: Altered mental status, lethargy    Summary:   Alexander Rouse is a 85 y.o. 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  Upon presentation to the ED patient was hypothermic, chest x-ray did not show any acute concerning abnormalities and urinalysis was bland.  Due to concern for infection patient was started on Zosyn and infectious work-up started.  CT of the chest obtained and was abnormal, pulmonology consulted to assist in care.  Troponins were slightly elevated admission, cardiology consulted to assist in care. MRSA screen returned positive and patient started on linezolid. Patient had bronchoscopy on 2021, significant findings included large crust obstructing medial segmental bronchus of the right lower lobe bronchus and purulent mucus plugging of right lower lobe bronchus, right mainstem bronchus and distal trachea.     Interval Followup:   No acute events overnight. Patient stated that he like his breathing was stable. Culture results from bronchoscopy still pending. Patient denies chest pain, abdominal pain, nausea or vomiting. Nursing with no additional acute issues reported.    Antibiotics:   -Linezolid  -Zosyn    Review of Systems   10 point review of systems performed and negative unless stated otherwise under subjective.    Objective   Objective     Vitals:   Temp:  [97 °F (36.1 °C)-98 °F (36.7 °C)] 97.3 °F (36.3 °C)  Heart Rate:  [69-80] 69  Resp:  [18-20] 18  BP: (110-120)/(58-74)  118/74  Flow (L/min):  [2-3] 3  Physical Exam   Gen: No acute distress, Conversant, Pleasant, sitting up in chair at bedside watching TV  HEENT: MMM, Atraumatic  Neck: Supple, Trachea midline  Resp: Good aeration, diminished breath sounds bilaterally, improved right basilar crackles rhonchi, normal respiratory effort, equal chest rise bilaterally  Card: RRR, No m/r/g  Abd: Soft, Nontender, Nondistended, + bowel sounds  Ext: No cyanosis, No clubbing  Neuro: CN II-XII grossly intact, No focal deficits appreciated  Psych: AAO x 3, Normal mood, Normal affect    Result Review    Result Review:  I have personally reviewed the results from the time of this admission to 7/17/2021 16:33 EDT and agree with these findings:  [x]  Laboratory: Electrolytes and renal function stable. WBC and hemoglobin stable.  [x]  Microbiology: BAL culture showing few gram-positive bacilli and few gram-positive cocci in pairs and chains.  []  Radiology:   [x]  EKG/Telemetry: Sinus rhythm. PVCs. NSVT.  []  Cardiology/Vascular:    []  Pathology:  []  Old records:  []  Other:    Assessment/Plan   Assessment / Plan     Assessment:  Right lower lobe postobstructive pneumonia with lung abscess from unspecified organism  Abnormal chest CT concerning for endobronchial lesion versus mucous plug with right lower lobe lung abscess  Sepsis due to above  Hypothermia  COPD without exacerbation  Possible aspiration  Elevated troponin  History of diastolic heart failure, not acutely exacerbated  History of tobacco abuse of cigarettes in remission     Plan:  -Cardiology and pulmonology consulted and following, appreciate assistance and recommendations in the care of this patient.   -Continue Zosyn (Day 3) and linezolid (Day 2) tailor antibiotics based on results of infectious work-up  -Resume Eliquis  -Continue atorvastatin, carvedilol and aspirin  -Continue nebulizers and bronchopulmonary hygiene protocol  -Continue supplemental O2 to maintain sats greater than  90%, wean as tolerated  -Will monitor electrolytes and renal function with BMP and magnesium level in the AM  -Will monitor WBC and Hgb with CBC in the AM  -Clinical course will dictate further management     DVT Prophylaxis: Eliquis  Diet: Mechanical soft, chopped, nectar thick liquids  Dispo: [facility] working on placement  Code Status: DNR     Personally reviewed patients labs and imaging, discussed with patient and nurse at bedside. Discussed case with the following consultants: Pulmonology.     Part of this note may be an electronic transcription/translation of spoken language to printed text using the Dragon dictation system.    DVT prophylaxis:  No DVT prophylaxis order currently exists.    CODE STATUS:   Level Of Support Discussed With: Health Care Surrogate  Code Status: No CPR  Medical Interventions (Level of Support Prior to Arrest): Full  Comments: POA son was at bedside and we discussed      Electronically signed by Tahir Rojas MD, 07/17/21, 4:33 PM EDT.

## 2021-07-17 NOTE — PLAN OF CARE
Goal Outcome Evaluation:  Plan of Care Reviewed With: patient           Outcome Summary: Patient presents with decreased strength, transfers, and functional mobility.  Patient presents with deficits that will benefit from skilled physical therapy services.  He would also benefit from a rehab placement upon discharge from the hospital.

## 2021-07-17 NOTE — PLAN OF CARE
Goal Outcome Evaluation:  Plan of Care Reviewed With: patient        Progress: improving  Outcome Summary: Pt stable this shift, family changed minds on pt going to [facility], social work to speak with family.    Eleni Villalobos RN

## 2021-07-17 NOTE — PLAN OF CARE
Goal Outcome Evaluation:   Pt states he is feeling much better after bronch. Much more alert, speech still hard to understand, pt still intermittently confused but pleasant. No acute changes or complaints this shift. Jeffrey Quigley RN         Progress: improving

## 2021-07-18 NOTE — THERAPY TREATMENT NOTE
Acute Care - Physical Therapy Treatment Note   [facility]     Patient Name: Alexander Rouse  : 1935  MRN: [id number]  Today's Date: 2021           PT Assessment (last 12 hours)      PT Evaluation and Treatment     Row Name 21          Physical Therapy Time and Intention    Subjective Information  complains of;swelling  -DF     Document Type  therapy note (daily note)  -DF     Mode of Treatment  individual therapy;physical therapy  -DF     Total Minutes, Physical Therapy  24 24  -DF     Patient Effort  poor  -DF     Row Name 21          Motor Skills    Motor Skills  therapeutic exercise  -DF     Therapeutic Exercise  hip;knee;ankle  -DF     Row Name 21          Hip (Therapeutic Exercise)    Hip (Therapeutic Exercise)  strengthening exercise  -DF     Hip Strengthening (Therapeutic Exercise)  bilateral;aBduction;heel slides;marching while seated;sitting;supine;10 repetitions;2 sets  -DF     Row Name 21          Knee (Therapeutic Exercise)    Knee (Therapeutic Exercise)  strengthening exercise  -DF     Knee Strengthening (Therapeutic Exercise)  bilateral;heel slides;LAQ (long arc quad);sitting;supine;10 repetitions;2 sets  -DF     Row Name 21          Ankle (Therapeutic Exercise)    Ankle (Therapeutic Exercise)  AROM (active range of motion)  -DF     Ankle AROM (Therapeutic Exercise)  bilateral;dorsiflexion;plantarflexion  -DF     Row Name 21          Positioning and Restraints    Pre-Treatment Position  in bed  -DF     Post Treatment Position  chair  -DF     In Chair  notified nsg;reclined;call light within reach;encouraged to call for assist;exit alarm on;legs elevated;heels elevated  -DF     Row Name 21          Progress Summary (PT)    Progress Toward Functional Goals (PT)  progress toward functional goals is gradual  -DF     Daily Progress Summary (PT)  -- skilled care required to attend to deficits outlined.  -DF       User Key   (r) = Recorded By, (t) = Taken By, (c) = Cosigned By    Initials Name Provider Type    Raven Mcdonnell PTA Physical Therapy Assistant        Physical Therapy Education                 Title: PT OT SLP Therapies (In Progress)     Topic: Physical Therapy (Done)     Point: Mobility training (Done)     Learning Progress Summary           Patient Acceptance, E,TB, VU by DP at 7/17/2021 1254                   Point: Home exercise program (Done)     Learning Progress Summary           Patient Acceptance, E,TB, VU by DP at 7/17/2021 1254                   Point: Body mechanics (Done)     Learning Progress Summary           Patient Acceptance, E,TB, VU by DP at 7/17/2021 1254                   Point: Precautions (Done)     Learning Progress Summary           Patient Acceptance, E,TB, VU by DP at 7/17/2021 1254                               User Key     Initials Effective Dates Name Provider Type Discipline    DP 06/03/21 -  Francheska Hunt, PT Physical Therapist PT              PT Recommendation and Plan     Progress Summary (PT)  Progress Toward Functional Goals (PT): progress toward functional goals is gradual  Daily Progress Summary (PT):  (skilled care required to attend to deficits outlined.)  Outcome Measures     Row Name 07/18/21 0900 07/17/21 1253          How much help from another person do you currently need...    Turning from your back to your side while in flat bed without using bedrails?  2  -DF  2  -DP     Moving from lying on back to sitting on the side of a flat bed without bedrails?  2  -DF  2  -DP     Moving to and from a bed to a chair (including a wheelchair)?  2  -DF  2  -DP     Standing up from a chair using your arms (e.g., wheelchair, bedside chair)?  2  -DF  2  -DP     Climbing 3-5 steps with a railing?  2  -DF  2  -DP     To walk in hospital room?  3  -DF  2  -DP     AM-PAC 6 Clicks Score (PT)  13  -DF  12  -DP        Functional Assessment    Outcome Measure Options  AM-PAC 6 Clicks Basic  Mobility (PT)  -DF  AM-PAC 6 Clicks Basic Mobility (PT)  -DP       User Key  (r) = Recorded By, (t) = Taken By, (c) = Cosigned By    Initials Name Provider Type    Raven Mcdonnell PTA Physical Therapy Assistant    Francheska Grissom, PT Physical Therapist           Time Calculation:   PT Charges     Row Name 07/18/21 0930             Time Calculation    PT Received On  07/18/21  -DF      PT Goal Re-Cert Due Date  07/26/21  -DF         Timed Charges    19509 - PT Therapeutic Exercise Minutes  10  -DF      43244 - Gait Training Minutes   4  -DF      86553 - PT Therapeutic Activity Minutes  10  -DF         Total Minutes    Timed Charges Total Minutes  24  -DF       Total Minutes  24  -DF        User Key  (r) = Recorded By, (t) = Taken By, (c) = Cosigned By    Initials Name Provider Type    Raven Mcdonnell PTA Physical Therapy Assistant        Therapy Charges for Today     Code Description Service Date Service Provider Modifiers Qty    83931684108 HC PT THER PROC EA 15 MIN 7/18/2021 Raven Knox PTA GP 1    34135564121 HC PT THERAPEUTIC ACT EA 15 MIN 7/18/2021 Raven Knox PTA GP 1          PT G-Codes  Outcome Measure Options: AM-PAC 6 Clicks Basic Mobility (PT)  AM-PAC 6 Clicks Score (PT): 13  AM-PAC 6 Clicks Score (OT): 13    Raven Knox PTA  7/18/2021

## 2021-07-18 NOTE — PLAN OF CARE
Goal Outcome Evaluation:  Plan of Care Reviewed With: patient, family        Progress: no change  Outcome Summary: Pt was tired today, rested a great deal of the day, ate meals with encouragement from family and staff.    Eleni iVllalobos RN

## 2021-07-18 NOTE — PLAN OF CARE
Goal Outcome Evaluation:   Lungs clear now, pt had rectal temp of 95, back on jasmin hugger. Jeffrey Quigley, RN         Progress: declining

## 2021-07-18 NOTE — PROGRESS NOTES
[facility]   Hospitalist Progress Note  Date: 2021  Patient Name: Alexander Rouse  : 1935  MRN: [id number]  Date of admission: 2021  Consultants:   -Pulmonology: Dr. Artur Randolph  -Cardiology: Dr. López Ho    Subjective   Subjective     Chief Complaint: Altered mental status, lethargy    Summary:   Alexander Rouse is a 85 y.o. 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  Upon presentation to the ED patient was hypothermic, chest x-ray did not show any acute concerning abnormalities and urinalysis was bland.  Due to concern for infection patient was started on Zosyn and infectious work-up started.  CT of the chest obtained and was abnormal, pulmonology consulted to assist in care.  Troponins were slightly elevated admission, cardiology consulted to assist in care. MRSA screen returned positive and patient started on linezolid. Patient had bronchoscopy on 2021, significant findings included large crust obstructing medial segmental bronchus of the right lower lobe bronchus and purulent mucus plugging of right lower lobe bronchus, right mainstem bronchus and distal trachea.     Interval Followup:   No acute events overnight.  Patient sitting up in chair at bedside and denies any chest pain, shortness breath, abdominal pain, nausea or vomiting.  Patient stated he felt like his breathing was improving.  Sensitivities still pending at this time.  Nursing with no additional acute issues to report.    Antibiotics:   -Linezolid  -Zosyn    Review of Systems   10 point review of systems performed and negative unless stated otherwise under subjective.    Objective   Objective     Vitals:   Temp:  [95 °F (35 °C)-97.5 °F (36.4 °C)] 96.8 °F (36 °C)  Heart Rate:   [67-99] 77  Resp:  [16-20] 18  BP: (114-120)/(60-80) 118/60  Flow (L/min):  [2] 2  Physical Exam   Gen: No acute distress, Conversant, Pleasant, sitting up in chair at bedside, resting comfortably  HEENT: MMM, Atraumatic  Neck: Supple, Trachea midline  Resp: Good aeration, coarse right basilar crackles and rhonchi (improved), equal chest rise bilaterally, no increased work of breathing  Card: RRR, No m/r/g  Abd: Soft, Nontender, Nondistended, + bowel sounds  Ext: No cyanosis, No clubbing  Neuro: CN II-XII grossly intact, No focal deficits appreciated  Psych: AAO x 3, Normal mood, Normal affect    Result Review    Result Review:  I have personally reviewed the results from the time of this admission to 7/18/2021 12:10 EDT and agree with these findings:  [x]  Laboratory: Electrolytes and renal function stable.  WBC and hemoglobin stable and reviewed.  [x]  Microbiology: PCR positive for Proteus.  []  Radiology:   [x]  EKG/Telemetry: PVCs.  NSVT.  Sinus rhythm.  []  Cardiology/Vascular:    []  Pathology:  []  Old records:  []  Other:    Assessment/Plan   Assessment / Plan     Assessment:  Right lower lobe postobstructive pneumonia with lung abscess from unspecified organism  Abnormal chest CT concerning for endobronchial lesion versus mucous plug with right lower lobe lung abscess  Sepsis due to above  Hypothermia  COPD without exacerbation  Possible aspiration  Elevated troponin  History of diastolic heart failure, not acutely exacerbated  History of tobacco abuse of cigarettes in remission     Plan:  -Cardiology and pulmonology consulted and following, appreciate assistance and recommendations in the care of this patient.   -Continue Zosyn (Day 4) and linezolid (Day 3) tailor antibiotics based on results of infectious work-up  -Continue Eliquis  -Continue atorvastatin, carvedilol and aspirin  -Continue nebulizers and bronchopulmonary hygiene protocol  -Continue supplemental O2 to maintain sats greater than 90%, wean  as tolerated  -PT/OT consulted  -Monitor electrolytes and renal function with BMP and magnesium level in the AM  -Monitor WBC and Hgb with CBC in the AM  -Clinical course will dictate further management     DVT Prophylaxis: Eliquis  Diet: Mechanical soft, chopped, nectar thick liquids  Dispo: [facility] working on placement  Code Status: DNR     Personally reviewed patients labs and imaging, discussed with patient and nurse at bedside. Discussed case with the following consultants: Pulmonology.     Part of this note may be an electronic transcription/translation of spoken language to printed text using the Dragon dictation system.    DVT prophylaxis:  Medical DVT prophylaxis orders are present.    CODE STATUS:   Level Of Support Discussed With: Health Care Surrogate  Code Status: No CPR  Medical Interventions (Level of Support Prior to Arrest): Full  Comments: POA son was at bedside and we discussed      Electronically signed by Tahir Rojas MD, 07/18/21, 12:10 PM EDT.

## 2021-07-18 NOTE — PROGRESS NOTES
Pulmonary / Critical Care Progress Note      Patient Name: Alexander Rouse  : 1935  MRN: [id number]  Attending:  Tahir Rojas MD  Date of admission: 2021    Subjective   Subjective   Follow-up for abnormal chest CT, pneumonia, hypoxia    Over past 24 hours:  On 2 L of oxygen.  MRSA PCR positive  Tolerated bronchoscopy well  Doing well  Feels breathing has improved  Feels breathing treatments are helping  Still weak  overall breathing is better      Review of Systems  General: Fatigue and weakness, otherwise denied complaints  Cardiovascular:  Denied complaints  Respiratory: Cough, otherwise denied complaints  Gastrointestinal: Denied complaints        Objective   Objective     Vitals:   Temp:  [95 °F (35 °C)-97.7 °F (36.5 °C)] 96.9 °F (36.1 °C)  Heart Rate:  [67-99] 82  Resp:  [16-24] 20  BP: (110-120)/(54-75) 114/54  Flow (L/min):  [2-8] 8    Physical Exam   Reviewed and no change from the below  Vital Signs Reviewed   WDWN, Alert, NAD.    HEENT:  PERRL, EOMI.  OP, nares clear, no sinus tenderness  Neck:  Supple, no JVD, no thyromegaly  Chest:  good aeration, diminished with coarse crackles and rhonchi right base, tympanic to percussion bilaterally, no work of breathing noted  CV: RRR, no MGR, pulses 2+, equal.  Abd:  Soft, NT, ND, + BS, no HSM  EXT:  no clubbing, no cyanosis, no edema  Neuro:  A&Ox3, CN grossly intact, no focal deficits.  Skin: No rashes or lesions noted    Result Review    Result Review:  I have personally reviewed the results from the time of this admission to 2021 19:29 EDT and agree with these findings:  [x]  Laboratory  [x]  Microbiology  [x]  Radiology  []  EKG/Telemetry   []  Cardiology/Vascular   []  Pathology  []  Old records  []  Other:  Most notable findings include: Chest CT personally reviewed.  MRSA PCR positive.  Rest of infectious work-up so far pending/negative.    Assessment/Plan   Assessment / Plan     Active Hospital Problems:  Active Hospital Problems     Diagnosis    • **Troponin level elevated    • Hypothermia    • Altered mental status    • Abnormal chest CT      Added automatically from request for surgery [id number]     • Cavitary lesion of lung      Added automatically from request for surgery [id number]           Impression:  Abnormal chest CT concerning for endobronchial lesion versus mucous plug with right lower lobe lung abscess  Right lower lobe postobstructive pneumonia with lung abscess from unspecified organism.  Question MRSA  Sepsis  Hypothermia  COPD without exacerbation  Therapeutic drug monitoring of antibiotics  Question aspiration into airways  History of non-small cell lung cancer/adenocarcinoma status post right upper lobe wedge resection in 2012  Tobacco abuse of cigarettes in remission     Plan:  Bronchoscopy showed mucous plugging  PCR positive for Proteus  Continue Zosyn  Will need 7 days Rx  Continue oxygen  Continue nebulizers  Continue airway clearance  Cont IV lasix    DVT prophylaxis:  Medical DVT prophylaxis orders are present.    CODE STATUS:   Level Of Support Discussed With: Health Care Surrogate  Code Status: No CPR  Medical Interventions (Level of Support Prior to Arrest): Full  Comments: POA son was at bedside and we discussed      Microbiology, imaging, labs, notes and medications personally reviewed.  Discussed with primary and endoscopy staff    Electronically signed by Anmol Walsh DO, 07/18/21, 7:30 PM EDT.

## 2021-07-19 NOTE — PLAN OF CARE
Goal Outcome Evaluation:  Plan of Care Reviewed With: patient, family            Pt sleeping frequently today. No change in mental status, alert to self, place, and situation. Had to wake up for meals but ate 100%. Zosyn and zyvox given IV. US placed new IV 20g right forearm. Contact for MRSA nares.

## 2021-07-19 NOTE — PROGRESS NOTES
Pulmonary / Critical Care Progress Note      Patient Name: Alexander Rouse  : 1935  MRN: [id number]  Attending:  Tahir Rojas MD  Date of admission: 2021    Subjective   Subjective   Follow-up for abnormal chest CT, pneumonia, hypoxia    21 Broncoscopy --> Mucous plugging and abscess of RLL.  BAL with + Proteus    Over past 24 hours, continues on IV zosyn, diuretics, and nebulizers.  No acute events overnight.    This morning,  Remains weak and bedridden.   Lying in bed on 4L NC.   Shortness of breath is better.   Has non-productive cough.   Denies any chest pain  No nausea, vomiting.       Review of Systems  General: Fatigue and weakness, otherwise denied complaints  Cardiovascular:  Denied complaints  Respiratory: Cough, otherwise denied complaints  Gastrointestinal: Denied complaints  Neuro: Generalized weakness+, bed ridden      Objective   Objective     Vitals:   Temp:  [96.9 °F (36.1 °C)-99.2 °F (37.3 °C)] 98.5 °F (36.9 °C)  Heart Rate:  [65-88] 84  Resp:  [14-28] 14  BP: (110-122)/(54-80) 112/62  Flow (L/min):  [2-8] 4    Physical Exam   Reviewed and no change from the below  Vital Signs Reviewed   General: WDWN male, Awake and Alert, NAD on 4L NC    HEENT:  PERRL, EOMI.  OP, nares clear, no sinus tenderness  Neck:  Supple, no JVD, no thyromegaly  Chest:  good aeration, diminished with coarse crackles and rhonchi right base, tympanic to percussion bilaterally, no work of breathing noted  CV: RRR, no MGR, pulses 2+, equal  Abd:  Soft, NT, ND, + BS, no HSM  EXT:  no clubbing, no cyanosis, no edema  Neuro:  A&Ox3, CN grossly intact, no focal deficits  Skin: No rashes or lesions noted    Result Review    Result Review:  I have personally reviewed the results from the time of this admission to 2021 10:07 EDT and agree with these findings:  [x]  Laboratory  [x]  Microbiology  [x]  Radiology  []  EKG/Telemetry   []  Cardiology/Vascular   []  Pathology  []  Old records  []  Other:  Most  notable findings include: MRSA PCR +. BAL with Proteus PCR +, WBC 12.24, Mg+ 1.7, K+ 4.0, Cr 0.82    Assessment/Plan   Assessment / Plan     Active Hospital Problems:  Active Hospital Problems    Diagnosis    • **Troponin level elevated    • Hypothermia    • Altered mental status    • Abnormal chest CT      Added automatically from request for surgery [id number]     • Cavitary lesion of lung      Added automatically from request for surgery [id number]       Impression:  Abnormal chest CT concerning for endobronchial lesion versus mucous plug with right lower lobe lung abscess  Right lower lobe postobstructive pneumonia with lung abscess from Proteus.    Sepsis  Hypothermia  COPD without exacerbation  Therapeutic drug monitoring of antibiotics  Question aspiration into airways  History of non-small cell lung cancer/adenocarcinoma status post right upper lobe wedge resection in 2012  Tobacco abuse of cigarettes in remission     Plan:  BAL with Procteus PCR +, Continue Zosyn, day #5.  Can switch to oral cephalosporin once the sensitivities back.    MRSA PCR + on swab, Continue Zyxox, day #4.  Continue supplemental O2 to keep sats >90%, wean as tolerated.  Continue triple nebulizer therapy with Brovana, Pulmicort, and Duonebs.  Continue airway clearance with metaneb.  Bronchopulmonary hygiene.  Encourage IS and flutter valve.  Continue Lasix 40 mg oral daily.    Trend renal panel and electrolytes and replace as needed.  Speech therapy on board and recommend Modified Barium.  Encourage activity.  PT/OT on board.  Up to chair/ambulate as tolerated.  Awaiting rehab placement at [facility].  Will need prolonged rehabilitation.    DVT prophylaxis:  Medical DVT prophylaxis orders are present.    CODE STATUS:   Level Of Support Discussed With: Health Care Surrogate  Code Status: No CPR  Medical Interventions (Level of Support Prior to Arrest): Full  Comments: POA son was at bedside and we discussed      Microbiology, imaging, labs,  notes and medications personally reviewed.  Discussed with primary service and bedside RN.    Electronically signed by JULIANNA Vila, 07/19/21, 2:03 PM EDT.  Electronically signed by Juancarlos Spring MD, 07/19/21, 10:07 AM EDT.  Electronically signed by Juancarlos Spring MD, 07/19/21, 5:13 PM EDT.

## 2021-07-19 NOTE — CASE MANAGEMENT/SOCIAL WORK
Pt and family have accepted bed at [facility].      [facility] can offer bed tomorrow 7/20/21.    No Pre-cert required.

## 2021-07-19 NOTE — PROGRESS NOTES
[facility]   Hospitalist Progress Note  Date: 2021  Patient Name: Alexander Rouse  : 1935  MRN: [id number]  Date of admission: 2021  Consultants:   -Pulmonology: Dr. Artur Walsh, Dr. Juancarlos Spring  -Cardiology: Dr. López Ho    Subjective   Subjective     Chief Complaint: Altered mental status, lethargy    Summary:   Alexander Rouse is a 85 y.o. 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  Upon presentation to the ED patient was hypothermic, chest x-ray did not show any acute concerning abnormalities and urinalysis was bland.  Due to concern for infection patient was started on Zosyn and infectious work-up started.  CT of the chest obtained and was abnormal, pulmonology consulted to assist in care.  Troponins were slightly elevated admission, cardiology consulted to assist in care. MRSA screen returned positive and patient started on linezolid. Patient had bronchoscopy on 2021, significant findings included large crust obstructing medial segmental bronchus of the right lower lobe bronchus and purulent mucus plugging of right lower lobe bronchus, right mainstem bronchus and distal trachea.     Interval Followup:   No acute events overnight.  Patient sitting up in bed, drowsy.  Speech therapy evaluated and recommended modified barium swallow study.  Patient's son present at bedside, stated that he noticed that his father had increased somnolence.  Patient denies chest pain, abdominal pain, nausea or vomiting.  He stated he did feel short of air but that it was essentially unchanged.  Nursing with no additional acute issues to report.    Antibiotics:   -Linezolid  -Zosyn    Review of Systems   10 point review of systems performed  and negative unless stated otherwise under subjective.    Objective   Objective     Vitals:   Temp:  [96.9 °F (36.1 °C)-99.2 °F (37.3 °C)] 98.5 °F (36.9 °C)  Heart Rate:  [65-88] 74  Resp:  [14-28] 18  BP: (110-122)/(54-80) 118/70  Flow (L/min):  [2-8] 4  Physical Exam   Gen: No acute distress, somnolent, Pleasant, sitting up in bed HEENT: MMM, Atraumatic  Neck: Supple, Trachea midline  Resp: Good aeration, improved right basilar crackles and rhonchi, normal respiratory effort, equal chest rise bilaterally  Card: RRR, No m/r/g  Abd: Soft, Nontender, Nondistended, + bowel sounds  Ext: No cyanosis, No clubbing  Neuro: CN II-XII grossly intact, No focal deficits appreciated  Psych: AAO x 3, Normal mood, Normal affect    Result Review    Result Review:  I have personally reviewed the results from the time of this admission to 7/19/2021 12:23 EDT and agree with these findings:  [x]  Laboratory: Creatinine and electrolytes stable.  WBC and hemoglobin remained stable as well.  [x]  Microbiology: PCR positive for Proteus.  Sensitivities pending  []  Radiology:   [x]  EKG/Telemetry: PVCs.  NSVT.  []  Cardiology/Vascular:    []  Pathology:  []  Old records:  []  Other:    Assessment/Plan   Assessment / Plan     Assessment:  Right lower lobe postobstructive pneumonia with lung abscess from unspecified organism, possible MRSA  Sepsis due to above  Hypothermia  COPD without exacerbation  Possible aspiration  Elevated troponin  History of diastolic heart failure, not acutely exacerbated  History of tobacco abuse of cigarettes in remission     Plan:  -Cardiology and pulmonology consulted and following, appreciate assistance and recommendations in the care of this patient.   -Continue Zosyn (Day 5) and Linezolid (Day 4) tailor antibiotics based on results of infectious work-up  -Continue Eliquis  -Continue atorvastatin, carvedilol and aspirin  -Continue nebulizers and bronchopulmonary hygiene protocol  -Ordered ABG  -Continue  supplemental O2 to maintain sats greater than 90%, wean as tolerated  -PT/OT consulted  -Modified barium swallow ordered, diet per speech   -Will monitor electrolytes and renal function with BMP and magnesium level in the AM  -Will monitor WBC and Hgb with CBC in the AM  -Clinical course will dictate further management     DVT Prophylaxis: Eliquis  Diet: Mechanical soft, chopped, nectar thick liquids  Dispo: SW working on placement  Code Status: DNR     Personally reviewed patients labs and imaging, discussed with patient and nurse at bedside. Discussed case with the following consultants: Pulmonology.     Part of this note may be an electronic transcription/translation of spoken language to printed text using the Dragon dictation system.    DVT prophylaxis:  Medical DVT prophylaxis orders are present.    CODE STATUS:   Level Of Support Discussed With: Health Care Surrogate  Code Status: No CPR  Medical Interventions (Level of Support Prior to Arrest): Full  Comments: POA son was at bedside and we discussed      Electronically signed by Tahir Rojas MD, 07/19/21, 12:23 PM EDT.

## 2021-07-19 NOTE — MBS/VFSS/FEES
Acute Care - Speech Language Pathology   Swallow Modified Barium Swallow Study [facility]     Patient Name: Alexander Rouse  : 1935  MRN: [id number]  Today's Date: 2021               Admit Date: 2021    Visit Dx:     ICD-10-CM ICD-9-CM   1. Cavitary lesion of lung  J98.4 518.89   2. Dysphagia, oropharyngeal  R13.12 787.22   3. Abnormal chest CT  R93.89 793.2   4. Decreased activities of daily living (ADL)  Z78.9 V49.89   5. Elevated troponin  R77.8 790.6   6. Difficulty walking  R26.2 719.7     Patient Active Problem List   Diagnosis   • CAFL (chronic airflow limitation) (CMS/HCC)   • Cough   • Cancer of lower lobe of lung (CMS/HCC)   • Disease of larynx   • Rotator cuff tear arthropathy   • Status post complete repair of rotator cuff   • Nodule of right lung   • Troponin level elevated   • Hypothermia   • Altered mental status   • Abnormal chest CT   • Cavitary lesion of lung     Past Medical History:   Diagnosis Date   • Apnea, sleep    • Asthma    • COPD (chronic obstructive pulmonary disease) (CMS/HCC)    • Hyperlipidemia    • TIA (transient ischemic attack)      Past Surgical History:   Procedure Laterality Date   • BRONCHOSCOPY N/A 2021    Procedure: BRONCHOSCOPY WITH ENDOBRONCHIAL ULTRASOUND/BRONCHIAL ALVEOLAR LAVAGE/BIOPSIES/BRUSHINGS/WASHINGS;  Surgeon: Artur Randolph MD;  Location: [facility] ENDOSCOPY;  Service: Pulmonary;  Laterality: N/A;  LUNG ABCESS   • CORONARY ANGIOPLASTY WITH STENT PLACEMENT     • LUNG CANCER SURGERY     • SHOULDER SURGERY Bilateral    DATE OF SERVICE: 2021    PERTINENT INFORMATION:  Patient is a 85 year old white male referred for modified barium swallow study to rule out silent aspiration..    Patient was referred for an MBSS by Dr. Rojas to rule out aspiration as well as to determine appropriate treatment plan for this patient.      PROCEDURE:    Patient was alert and cooperative.  The patient was viewed in lateral projection.  The following Ba  consistencies were administered: Thin liquids, nectar thick liquids, purée consistencies and soft solids.  The following compensatory swallowing strategies were performed: Bolus as modification      RESULTS:    1. Oral stage is characterized by adequate bolus preparation and control. Appears to have timely oral transit. Pharyngeal phase is disorganized with pooling in the vallecular space prior to swallow with all trials presented. Some mildly reduced laryngeal elevation noted. Deep laryngeal penetration with thin liquids noted at times. Vallecular and piriform sinus residue after the swallow with thin liquid trials. Spillover with continued laryngeal penetration from residue. Patient tolerates nectar thick liquids well without penetration or aspiration. Tolerates puréed and soft solids without penetration or aspiration.      IMPRESSIONS:    Patient demonstrated mild to moderate pharyngeal dysphagia characterized by deep laryngeal penetration with thin liquids and pharyngeal residue.     FUNCTIONAL DEFICIT: Patient scored level 4 of 7 on Functional Communication Measures for swallowing indicating a 40-59% limitation in function for current status, goal status, and discharge status.      RECOMMENDATIONS:   1. Mechanical soft diet-nectar thick liquids  2. 90 degrees upright for all intake  3. Allow single sips of regular ice water between meals only following good oral care l  4. Slow rate, small bites/drinks  Yes patient/responsible party agrees with the plan of care and has been informed of all alternatives, risks and benefits.    Thank you for this referral.    EDUCATION  The patient has been educated in the following areas:   Modified Diet Instruction.               Dulce Maria Larsen, SLP  7/19/2021

## 2021-07-19 NOTE — PLAN OF CARE
Goal Outcome Evaluation:  PT RESTED WELL WITH NO COMPLAINTS OF PAIN DOESN'T SAY MUCH MS RN

## 2021-07-20 NOTE — PROGRESS NOTES
Pulmonary / Critical Care Progress Note      Patient Name: Alexander Rouse  : 1935  MRN: [id number]  Attending:  Jacky Hewitt MD  Date of admission: 2021    Subjective   Subjective   Follow-up for abnormal chest CT, pneumonia, hypoxia    21 Broncoscopy --> Mucous plugging and abscess of RLL.  BAL with + Proteus    Over past 24 hours, continues on ceftriaxone, diuretics, and nebulizers.  No acute events overnight.    This morning,  Feels better.  Has no chest pain or chest tightness.  Up in chair on 4L NC   No distress noted  Denies any shortness of breath or cough.  No nausea, vomiting  Awaiting rehab    Review of Systems  General: Fatigue and weakness, otherwise denied complaints  Cardiovascular:  Denied complaints  Respiratory: Cough, otherwise denied complaints  Gastrointestinal: Denied complaints  Neuro: Generalized weakness+, bed ridden      Objective   Objective     Vitals:   Temp:  [97 °F (36.1 °C)-97.7 °F (36.5 °C)] 97.7 °F (36.5 °C)  Heart Rate:  [64-85] 80  Resp:  [14-18] 16  BP: (118-130)/(60-74) 130/74  Flow (L/min):  [4] 4    Physical Exam   Vital Signs Reviewed   General: WDWN male, Awake and Alert, NAD on 4L NC    HEENT:  PERRL, EOMI.  OP, nares clear, no sinus tenderness  Neck:  Supple, no JVD, no thyromegaly  Chest:  good aeration, diminished with bibasilar crackles, tympanic to percussion bilaterally, no work of breathing noted  CV: RRR, no MGR, pulses 2+, equal  Abd:  Soft, NT, ND, + BS, no HSM  EXT:  no clubbing, no cyanosis, no edema  Neuro:  A&Ox3, CN grossly intact, no focal deficits  Skin: No rashes or lesions noted    Result Review    Result Review:  I have personally reviewed the results from the time of this admission to 2021 10:39 EDT and agree with these findings:  [x]  Laboratory  [x]  Microbiology  [x]  Radiology  []  EKG/Telemetry   []  Cardiology/Vascular   []  Pathology  []  Old records  []  Other:  Most notable findings include: ABGs --> 7.39, 53.2, 83.6,  31.8 on 4L.   WBC 9.83, Mg+ 1.9, K+ 4.1, Cr 0.91    Assessment/Plan   Assessment / Plan     Active Hospital Problems:  Active Hospital Problems    Diagnosis    • **Troponin level elevated    • Hypothermia    • Altered mental status    • Abnormal chest CT      Added automatically from request for surgery [id number]     • Cavitary lesion of lung      Added automatically from request for surgery [id number]       Impression:  Abnormal chest CT concerning for endobronchial lesion versus mucous plug with right lower lobe lung abscess  Right lower lobe postobstructive pneumonia with lung abscess from Proteus.    Sepsis  Hypothermia  COPD without exacerbation  Therapeutic drug monitoring of antibiotics  Question aspiration into airways  History of non-small cell lung cancer/adenocarcinoma status post right upper lobe wedge resection in 2012  Tobacco abuse of cigarettes in remission     Plan:  BAL with Procteus PCR +, switch to IV ceftriaxone while in hospital.  Oral cefdinir twice daily at discharge.  MRSA PCR + on swab, Continue Zyxox, day #5.  We will do 7 more days orally as an outpatient.  Wean oxygen as tolerated. Continue airway clearance with metaneb.  Continue triple nebulizer therapy with Brovana, Pulmicort, and Duonebs.  Bronchopulmonary hygiene.  Encourage IS and flutter valve.  Continue Lasix 40 mg oral daily.    Trend renal panel and electrolytes and replace as needed.  Speech therapy on board.  Encourage activity.  PT/OT on board.  Up to chair/ambulate as tolerated.  Awaiting discharge to [facility] today.  Stable for discharge from pulmonary perspective.    DVT prophylaxis:  Medical DVT prophylaxis orders are present.    CODE STATUS:   Level Of Support Discussed With: Health Care Surrogate  Code Status: No CPR  Medical Interventions (Level of Support Prior to Arrest): Full  Comments: POA son was at bedside and we discussed      Microbiology, imaging, labs, notes and medications personally reviewed.  Discussed with  primary service and bedside RN.    Electronically signed by JULIANNA Vila, 07/20/21, 3:30 PM EDT.  Electronically signed by Juancarlos Spring MD, 07/20/21, 5:51 PM EDT.

## 2021-07-20 NOTE — THERAPY TREATMENT NOTE
Acute Care - Speech Language Pathology   Swallow Treatment Note  [facility]     Patient Name: Alexander Rouse  : 1935  MRN: [id number]  Today's Date: 2021               Admit Date: 2021    Visit Dx:     ICD-10-CM ICD-9-CM   1. Cavitary lesion of lung  J98.4 518.89   2. Dysphagia, oropharyngeal  R13.12 787.22   3. Abnormal chest CT  R93.89 793.2   4. Decreased activities of daily living (ADL)  Z78.9 V49.89   5. Elevated troponin  R77.8 790.6   6. Difficulty walking  R26.2 719.7     Patient Active Problem List   Diagnosis   • CAFL (chronic airflow limitation) (CMS/HCC)   • Cough   • Cancer of lower lobe of lung (CMS/HCC)   • Disease of larynx   • Rotator cuff tear arthropathy   • Status post complete repair of rotator cuff   • Nodule of right lung   • Troponin level elevated   • Hypothermia   • Altered mental status   • Abnormal chest CT   • Cavitary lesion of lung     Past Medical History:   Diagnosis Date   • Apnea, sleep    • Asthma    • COPD (chronic obstructive pulmonary disease) (CMS/HCC)    • Hyperlipidemia    • TIA (transient ischemic attack)      Past Surgical History:   Procedure Laterality Date   • BRONCHOSCOPY N/A 2021    Procedure: BRONCHOSCOPY WITH ENDOBRONCHIAL ULTRASOUND/BRONCHIAL ALVEOLAR LAVAGE/BIOPSIES/BRUSHINGS/WASHINGS;  Surgeon: Artur Randolph MD;  Location: [facility] ENDOSCOPY;  Service: Pulmonary;  Laterality: N/A;  LUNG ABCESS   • CORONARY ANGIOPLASTY WITH STENT PLACEMENT     • LUNG CANCER SURGERY     • SHOULDER SURGERY Bilateral      Subjective/Behavioral Observations: Patient seen for dysphagia therapy      Current Diet: Mechanical soft diet-nectar thick liquids        Current Strategies: Small bites/drinks        Treatment received: Results and recommendations of modified barium swallow study were reviewed at length.  Patient educated on need for thickened liquids.  Patient tolerating nectar thick liquids well without clinical sign or symptom of aspiration.  Patient may  have spoonfed trials of regular ice water between meals only.  Vocal quality remained clear cervical sedation    Results of treatment: As stated        Progress toward goals: Progressing        Barriers to Achieving goals: Medical status        Plan of care:/changes in plan: Mechanical soft diet-nectar thick liquids, may have spoon fed sips of regular water between meals only.  Oral meds in applesauce        EDUCATION  The patient has been educated in the following areas:   Dysphagia (Swallowing Impairment) Modified Diet Instruction.            Dulce Maria Larsen, SLP  7/20/2021

## 2021-07-20 NOTE — SIGNIFICANT NOTE
07/20/21 0837   Plan   Final Discharge Disposition Code 62 - inpatient rehab facility   Final Note Patient has a rehab bed at [facility] today.

## 2021-07-20 NOTE — PLAN OF CARE
Goal Outcome Evaluation:  PATIENT RESTED WELL WITH NO COMPLAINTS OF PAIN ROSY ARZOLA ON RECEIVED 2 ANTIBIOTICS DURING NIGHT MALE PUREWICK REMOVED DUE TO LEAKING MS RN

## 2021-07-20 NOTE — THERAPY TREATMENT NOTE
Acute Care - Physical Therapy Treatment Note   [facility]     Patient Name: Alexander Rouse  : 1935  MRN: [id number]  Today's Date: 2021      Visit Dx:     ICD-10-CM ICD-9-CM   1. Cavitary lesion of lung  J98.4 518.89   2. Dysphagia, oropharyngeal  R13.12 787.22   3. Abnormal chest CT  R93.89 793.2   4. Decreased activities of daily living (ADL)  Z78.9 V49.89   5. Elevated troponin  R77.8 790.6   6. Difficulty walking  R26.2 719.7     Patient Active Problem List   Diagnosis   • CAFL (chronic airflow limitation) (CMS/[facility])   • Cough   • Cancer of lower lobe of lung (CMS/HCC)   • Disease of larynx   • Rotator cuff tear arthropathy   • Status post complete repair of rotator cuff   • Nodule of right lung   • Troponin level elevated   • Hypothermia   • Altered mental status   • Abnormal chest CT   • Cavitary lesion of lung     Past Medical History:   Diagnosis Date   • Apnea, sleep    • Asthma    • COPD (chronic obstructive pulmonary disease) (CMS/[facility])    • Hyperlipidemia    • TIA (transient ischemic attack)      Past Surgical History:   Procedure Laterality Date   • BRONCHOSCOPY N/A 2021    Procedure: BRONCHOSCOPY WITH ENDOBRONCHIAL ULTRASOUND/BRONCHIAL ALVEOLAR LAVAGE/BIOPSIES/BRUSHINGS/WASHINGS;  Surgeon: Artur Randolph MD;  Location: [facility] ENDOSCOPY;  Service: Pulmonary;  Laterality: N/A;  LUNG ABCESS   • CORONARY ANGIOPLASTY WITH STENT PLACEMENT     • LUNG CANCER SURGERY     • SHOULDER SURGERY Bilateral         PT Assessment (last 12 hours)      PT Evaluation and Treatment     Row Name 21 1141          Physical Therapy Time and Intention    Subjective Information  complains of;weakness  -DK     Document Type  therapy note (daily note)  -DK     Mode of Treatment  individual therapy;physical therapy  -DK     Patient Effort  adequate  -DK     Symptoms Noted During/After Treatment  none  -DK     Row Name 21 1141          Cognition    Affect/Mental Status (Cognitive)  confused;flat/blunted  affect  -DK     Orientation Status (Cognition)  oriented to;person;situation  -DK     Follows Commands (Cognition)  physical/tactile prompts required;repetition of directions required;verbal cues/prompting required  -DK     Cognitive Function (Cognitive)  attention deficit  -DK     Attention Deficit (Cognitive)  distractible in noisy environment;distractible in quiet environment;focused/sustained attention;minimal deficit  -DK     Personal Safety Interventions  gait belt;nonskid shoes/slippers when out of bed;supervised activity  -DK     Row Name 07/20/21 1141          Pain Scale: Numbers Pre/Post-Treatment    Pretreatment Pain Rating  0/10 - no pain  -DK     Posttreatment Pain Rating  0/10 - no pain  -DK     Row Name 07/20/21 1141          Transfers    Transfers  sit-stand transfer;stand-sit transfer  -DK     Sit-Stand [place] (Transfers)  contact guard;minimum assist (75% patient effort);1 person assist  -DK     Stand-Sit [place] (Transfers)  contact guard;minimum assist (75% patient effort);1 person assist  -DK     Row Name 07/20/21 1141          Sit-Stand Transfer    Assistive Device (Sit-Stand Transfers)  walker, front-wheeled  -DK     Row Name 07/20/21 1141          Stand-Sit Transfer    Assistive Device (Stand-Sit Transfers)  walker, front-wheeled  -DK     Row Name 07/20/21 1141          Gait/Stairs (Locomotion)    Gait/Stairs Locomotion  gait/ambulation independence;gait/ambulation assistive device;distance ambulated;gait pattern  -DK     [place] Level (Gait)  contact guard;minimum assist (75% patient effort);1 person assist  -DK     Assistive Device (Gait)  walker, front-wheeled  -     Distance in Feet (Gait)  8 4' forward / reverse  -DK     Pattern (Gait)  step-to  -DK     Deviations/Abnormal Patterns (Gait)  festinating/shuffling;gait speed decreased;stride length decreased;base of support, narrow  -DK     Bilateral Gait Deviations  forward flexed posture  -DK     Row Name 07/20/21 1141           Safety Issues, Functional Mobility    Safety Issues Affecting Function (Mobility)  at risk behavior observed;impulsivity;awareness of need for assistance;judgment;insight into deficits/self-awareness;safety precaution awareness  -     Impairments Affecting Function (Mobility)  balance;cognition;endurance/activity tolerance;strength;range of motion (ROM)  -     Cognitive Impairments, Mobility Safety/Performance  attention;awareness, need for assistance;impulsivity;judgment;insight into deficits/self-awareness;safety precaution awareness  -     Row Name 07/20/21 1141          Balance    Balance Assessment  standing dynamic balance  -     Dynamic Standing Balance  mild impairment;supported;standing  -DK     Balance Interventions  standing;supported;dynamic;tandem gait  -     Row Name 07/20/21 1141          Motor Skills    Motor Skills  therapeutic exercise  -     Therapeutic Exercise  hip;knee;ankle  -     Row Name 07/20/21 1141          Hip (Therapeutic Exercise)    Hip (Therapeutic Exercise)  AROM (active range of motion)  -     Hip AROM (Therapeutic Exercise)  bilateral;flexion;extension;aBduction;aDduction;sitting;10 repetitions 2 sets  -     Row Name 07/20/21 1141          Knee (Therapeutic Exercise)    Knee (Therapeutic Exercise)  AROM (active range of motion)  -     Knee AROM (Therapeutic Exercise)  bilateral;flexion;extension;LAQ (long arc quad);sitting;10 repetitions;2 sets  -     Row Name 07/20/21 1141          Ankle (Therapeutic Exercise)    Ankle (Therapeutic Exercise)  AROM (active range of motion)  -     Ankle AROM (Therapeutic Exercise)  dorsiflexion;plantarflexion;bilateral;sitting;10 repetitions;2 sets  -     Row Name 07/20/21 1141          Plan of Care Review    Plan of Care Reviewed With  patient  -DK     Progress  improving  -     Row Name 07/20/21 1141          Positioning and Restraints    Pre-Treatment Position  sitting in chair/recliner  -DK     Post  Treatment Position  chair  -DK     In Chair  reclined;encouraged to call for assist;call light within reach;exit alarm on;legs elevated  -DK     Row Name 07/20/21 1141          Therapy Assessment/Plan (PT)    Rehab Potential (PT)  good, to achieve stated therapy goals  -DK     Criteria for Skilled Interventions Met (PT)  skilled treatment is necessary  -DK     Problem List (PT)  problems related to;balance;cognition;mobility;strength;hearing;communication  -DK     Activity Limitations Related to Problem List (PT)  unable to ambulate safely;unable to transfer safely  -DK     Row Name 07/20/21 1141          Progress Summary (PT)    Progress Toward Functional Goals (PT)  progress toward functional goals is good  -DK       User Key  (r) = Recorded By, (t) = Taken By, (c) = Cosigned By    Initials Name Provider Type    Marcela Gay PTA Physical Therapy Assistant        Physical Therapy Education                 Title: PT OT SLP Therapies (In Progress)     Topic: Physical Therapy (Done)     Point: Mobility training (Done)     Learning Progress Summary           Patient Acceptance, E,TB, VU by DP at 7/17/2021 1254                   Point: Home exercise program (Done)     Learning Progress Summary           Patient Acceptance, E,TB, VU by DP at 7/17/2021 1254                   Point: Body mechanics (Done)     Learning Progress Summary           Patient Acceptance, E,TB, VU by DP at 7/17/2021 1254                   Point: Precautions (Done)     Learning Progress Summary           Patient Acceptance, E,TB, VU by DP at 7/17/2021 1254                               User Key     Initials Effective Dates Name Provider Type Discipline    DP 06/03/21 -  Francheska Hunt, PT Physical Therapist PT              PT Recommendation and Plan  Planned Therapy Interventions (PT): balance training, bed mobility training, gait training, ROM (range of motion), strengthening, transfer training  Therapy Frequency (PT): daily  Progress  Summary (PT)  Progress Toward Functional Goals (PT): progress toward functional goals is good  Plan of Care Reviewed With: patient  Progress: improving  Outcome Measures     Row Name 07/20/21 1146 07/18/21 0900 07/17/21 1253       How much help from another person do you currently need...    Turning from your back to your side while in flat bed without using bedrails?  2  -DK  2  -DF  2  -DP    Moving from lying on back to sitting on the side of a flat bed without bedrails?  2  -DK  2  -DF  2  -DP    Moving to and from a bed to a chair (including a wheelchair)?  3  -DK  2  -DF  2  -DP    Standing up from a chair using your arms (e.g., wheelchair, bedside chair)?  3  -DK  2  -DF  2  -DP    Climbing 3-5 steps with a railing?  2  -DK  2  -DF  2  -DP    To walk in hospital room?  3  -DK  3  -DF  2  -DP    AM-PAC 6 Clicks Score (PT)  15  -DK  13  -DF  12  -DP       Functional Assessment    Outcome Measure Options  AM-PAC 6 Clicks Basic Mobility (PT)  -DK  AM-PAC 6 Clicks Basic Mobility (PT)  -DF  AM-PAC 6 Clicks Basic Mobility (PT)  -DP      User Key  (r) = Recorded By, (t) = Taken By, (c) = Cosigned By    Initials Name Provider Type    Raven Mcdonnell, JIE Physical Therapy Assistant    Marcela Gay PTA Physical Therapy Assistant    Francheska Grissom, PT Physical Therapist           Time Calculation:   PT Charges     Row Name 07/20/21 1147             Time Calculation    PT Received On  07/20/21  -DK      PT Goal Re-Cert Due Date  07/26/21  -DK         Timed Charges    22950 - PT Therapeutic Exercise Minutes  14  -DK      15035 - Gait Training Minutes   3  -DK      45115 - PT Therapeutic Activity Minutes  6  -DK         Total Minutes    Timed Charges Total Minutes  23  -DK       Total Minutes  23  -DK        User Key  (r) = Recorded By, (t) = Taken By, (c) = Cosigned By    Initials Name Provider Type    Marcela Gay PTA Physical Therapy Assistant        Therapy Charges for Today     Code Description  Service Date Service Provider Modifiers Qty    29717262641 HC PT THER PROC EA 15 MIN 7/20/2021 Marcela Mary, JIE GP 1    51636105774 HC PT THERAPEUTIC ACT EA 15 MIN 7/20/2021 Marcela Mary PTA GP 1          PT G-Codes  Outcome Measure Options: AM-PAC 6 Clicks Basic Mobility (PT)  AM-PAC 6 Clicks Score (PT): 15  AM-PAC 6 Clicks Score (OT): 13    Marcela Mary PTA  7/20/2021

## 2021-07-20 NOTE — DISCHARGE SUMMARY
[facility]         HOSPITALIST  DISCHARGE SUMMARY    Patient Name: Alexander Rouse  : 1935  MRN: [id number]    Date of Admission: 2021  Date of Discharge:  21    Primary Care Physician: Sharif Singer MD    Consults     Date and Time Order Name Status Description    7/15/2021  7:39 AM Inpatient Pulmonology Consult Completed     2021  8:51 PM Inpatient Cardiology Consult Completed     2021  4:43 PM Hospitalist (on-call MD unless specified) Completed     2021  4:37 PM Cardiology (on-call MD unless specified) Completed           Final Diagnosis:  Right lower lobe postobstructive pneumonia with lung abscess from Proteus Pneumonia and possible MRSA  Sepsis due to above  Hypothermia  COPD without exacerbation  Possible aspiration  Elevated troponin  History of diastolic heart failure, not acutely exacerbated  History of tobacco abuse of cigarettes in remission       Hospital Course     Hospital Course:  Alexander Rouse is a 85 y.o. 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  Upon presentation to the ED patient was hypothermic, chest x-ray did not show any acute concerning abnormalities and urinalysis was bland.  Due to concern for infection patient was started on Zosyn and infectious work-up started.  CT of the chest obtained and was abnormal, pulmonology consulted to assist in care.  Troponins were slightly elevated admission, cardiology consulted to assist in care but no acute intervention was required. MRSA screen returned positive and patient started on linezolid.  Patient had bronchoscopy on 2021, significant findings included large crust obstructing medial segmental bronchus of the right lower lobe bronchus and  purulent mucus plugging of right lower lobe bronchus, right mainstem bronchus and distal trachea. Proteus grew from bal studies.  Patient was transitioned to cefdinir and zyvox to complete a 7 day course of antibiotics (cefdinir to complete last day on 7/21 and zyvox to complete 7/23).  Patients oxygen continued to wean down. Rehab was established at [facility].  Patient will likely have a prolonged recovery course given the severity of his initial infection.      Patient discharged in stable condition to [facility] rehab.      CODE STATUS:  Code Status and Medical Interventions:   Ordered at: 07/14/21 1751     Level Of Support Discussed With:    Health Care Surrogate     Code Status:    No CPR     Medical Interventions (Level of Support Prior to Arrest):    Full     Comments:    POA son was at bedside and we discussed       DISCHARGE Follow Up Recommendations for labs and diagnostics:   -continue speech, pt and ot evaluations  -continued modified diet as per speech evaluation as noted below       Day of Discharge     Vital Signs:  Temp:  [97 °F (36.1 °C)-97.7 °F (36.5 °C)] 97.7 °F (36.5 °C)  Heart Rate:  [64-85] 80  Resp:  [14-18] 16  BP: (118-130)/(60-74) 130/74  Flow (L/min):  [4] 4    Physical Exam  Gen: awake, resting in chair at bedside, appears older than stated age, NAD, conversant  HENT: eomi, no scleral icterus, no discharge  Resp: mild ronchi, no wheezes, on 3L NC breathing comfortably sat'ing 96%   CV: RRR, no LE pitting edema  GI: Abdomen soft, NT, ND, no guarding, +BS  Psych: fair mood and affect, answering basic questions appropriately, requires prompting for year         Discharge Details        Discharge Medications      New Medications      Instructions Start Date   cefdinir 300 MG capsule  Commonly known as: OMNICEF   300 mg, Oral, 2 Times Daily   Start Date: July 21, 2021     linezolid 600 MG tablet  Commonly known as: Zyvox   600 mg, Oral, 2 Times Daily      nitroglycerin 0.4 MG SL  tablet  Commonly known as: NITROSTAT   0.4 mg, Sublingual, Every 5 Minutes PRN, Take no more than 3 doses in 15 minutes.      saccharomyces boulardii 250 MG capsule  Commonly known as: Florastor   250 mg, Oral, 2 Times Daily         Continue These Medications      Instructions Start Date   albuterol (2.5 MG/3ML) 0.083% nebulizer solution  Commonly known as: PROVENTIL   2.5 mg, Nebulization, Every 4 Hours PRN      apixaban 5 MG tablet tablet  Commonly known as: ELIQUIS   5 mg, Oral, 2 Times Daily      aspirin 81 MG tablet   81 mg, Oral, Daily      atorvastatin 80 MG tablet  Commonly known as: LIPITOR   40 mg, Oral, Nightly      carvedilol 3.125 MG tablet  Commonly known as: COREG   3.125 mg, Oral, 2 Times Daily With Meals      cetirizine 10 MG tablet  Commonly known as: zyrTEC   10 mg, Oral, Daily      cholecalciferol 25 MCG (1000 UT) tablet  Commonly known as: VITAMIN D3   1,000 Units, Oral, Daily      furosemide 40 MG tablet  Commonly known as: LASIX   40 mg, Oral, Daily      guaiFENesin 600 MG 12 hr tablet  Commonly known as: MUCINEX   1,200 mg, Oral, 2 Times Daily      ipratropium-albuterol 0.5-2.5 mg/3 ml nebulizer  Commonly known as: DUO-NEB   3 mL, Nebulization, Every 4 Hours PRN      mometasone-formoterol 100-5 MCG/ACT inhaler  Commonly known as: DULERA 100   2 puffs, Inhalation, 2 Times Daily - RT      montelukast 10 MG tablet  Commonly known as: SINGULAIR   10 mg, Oral, Nightly      omeprazole 40 MG capsule  Commonly known as: priLOSEC   40 mg, Oral, Daily      Selenium 200 MCG tablet   200 mcg, Oral         Stop These Medications    traZODone 50 MG tablet  Commonly known as: DESYREL              Discharge Disposition:  Rehab Facility or Unit (DC - External)    Diet:     RECOMMENDATIONS:   1. Mechanical soft diet-nectar thick liquids  2. 90 degrees upright for all intake  3. Allow single sips of regular ice water between meals only following good oral care l  4. Slow rate, small bites/drinks  Yes  patient/responsible party agrees with the plan of care and has been informed of all alternatives, risks and benefits.    Discharge Activity: advance as tolerated    Future Appointments   Date Time Provider Department Center   11/17/2021  9:45 AM Mitch Montelongo MD [facility] CD [facility]           Pertinent  and/or Most Recent Results     PROCEDURES:   7/16/21 [facility] --> Mucous plugging and abscess of RLL.  BAL with + Proteus    LAB RESULTS:      Lab 07/20/21 0527 07/19/21 0437 07/18/21 0436 07/17/21  0424 07/16/21  0425 07/15/21  0829 07/14/21  1542 07/14/21  1504   WBC 9.83 12.24* 12.43* 13.98* 12.24* 13.80*  --   --    HEMOGLOBIN 9.4* 8.8* 9.3* 8.5* 8.7* 10.1*  --   --    HEMATOCRIT 31.2* 28.6* 31.5* 28.3* 28.4* 32.8*  --   --    PLATELETS 202 185 215 196 202 237  --   --    NEUTROS ABS 7.67* 9.89* 10.06*  --  9.95* 11.53*  --   --    IMMATURE GRANS (ABS) 0.04 0.05 0.04  --  0.05 0.06*  --   --    LYMPHS ABS 0.84 0.86 0.89  --  0.92 0.99  --   --    MONOS ABS 0.96* 1.30* 1.08*  --  1.18* 1.09*  --   --    EOS ABS 0.30 0.13 0.34  --  0.13 0.11  --   --    MCV 79.0 77.5* 78.9* 79.1 77.2* 78.1*  --   --    LACTATE  --   --   --   --   --   --   --  1.1   LACTATE, ARTERIAL  --   --   --   --   --   --  0.98  --          Lab 07/20/21 0527 07/19/21 0437 07/18/21  0436 07/17/21  0424 07/16/21  0425 07/14/21  1542   SODIUM 139 134* 137 136 139  --    SODIUM, ARTERIAL  --   --   --   --   --  136.6   POTASSIUM 4.1 4.0 3.7 4.1 3.5  --    CHLORIDE 97* 96* 96* 98 99  --    CO2 32.4* 31.9* 34.8* 33.8* 33.3*  --    ANION GAP 9.6 6.1 6.2 4.2* 6.7  --    BUN 15 14 16 22 28*  --    CREATININE 0.91 0.82 0.80 0.87 0.98  --    GLUCOSE 87 96 98 97 89  --    GLUCOSE, ARTERIAL  --   --   --   --   --  139*   CALCIUM 8.6 8.2* 8.3* 7.9* 7.9*  --    IONIZED CALCIUM  --   --   --   --   --  1.12*   MAGNESIUM 1.9 1.7 1.8 1.8 1.8  --          Lab 07/15/21  0829 07/14/21  1503   TOTAL PROTEIN 5.7* 6.0   ALBUMIN 3.40* 3.80   GLOBULIN 2.3  2.2   ALT (SGPT) 19 23   AST (SGOT) 15 18   BILIRUBIN 0.9 0.7   ALK PHOS 103 103         Lab 07/15/21  1820 07/15/21  1154 07/15/21  0829 07/15/21  0042 07/14/21  1503   PROBNP  --   --   --   --  409.3   TROPONIN T 0.034* 0.040* 0.043* 0.025 0.037*  0.036*                 Lab 07/19/21  1251 07/14/21  1542   PH, ARTERIAL 7.395 7.442   PCO2, ARTERIAL 53.2* 49.8*   PO2 ART 83.6 70.4*   O2 SATURATION ART 95.2 92.3*   FIO2  --  28   HCO3 ART 31.8* 33.2*   BASE EXCESS ART 6.0* 8.0*   CARBOXYHEMOGLOBIN 0.1 1.0     Brief Urine Lab Results  (Last result in the past 365 days)      Color   Clarity   Blood   Leuk Est   Nitrite   Protein   CREAT   Urine HCG        07/14/21 1536 Yellow Clear Negative Negative Negative Negative             Microbiology Results (last 10 days)     Procedure Component Value - Date/Time    AFB Culture - Lavage, Endobronchial [[id number]] Collected: 07/16/21 0948    Lab Status: Preliminary result Specimen: Lavage from Endobronchial Updated: 07/19/21 1510     AFB Stain No acid fast bacilli seen on direct smear      No acid fast bacilli seen on concentrated smear    Respiratory Culture - Wash, Lung, Right Lower Lobe [[id number]] Collected: 07/16/21 0948    Lab Status: Final result Specimen: Wash from Lung, Right Lower Lobe Updated: 07/18/21 1012     Respiratory Culture Light growth (2+) Normal Respiratory Rena: NO S.aureus/MRSA or Pseudomonas aeruginosa     Gram Stain Many (4+) WBCs seen      Moderate (3+) Yeast with hyphae seen      Few (2+) Gram positive bacilli resembling diphtheroids      Few (2+) Gram positive cocci in pairs and chains    Virus Culture - Lavage, Endobronchial [[id number]] Collected: 07/16/21 0945    Lab Status: Preliminary result Specimen: Lavage from Endobronchial Updated: 07/19/21 1306     Viral Culture, General Comment     Comment: Preliminary Report:  No virus isolated at 24 hours. Next report to follow after 4 days.       Narrative:      Performed at:   - [facility]  [place]  [address], [place]  [id number]  : Roberto Stokes MD, Phone:  [phone]    AFB Culture - Lavage, Endobronchial [[id number]] Collected: 07/16/21 0945    Lab Status: Preliminary result Specimen: Lavage from Endobronchial Updated: 07/19/21 1510     AFB Stain No acid fast bacilli seen on direct smear      No acid fast bacilli seen on concentrated smear    BAL Culture, Quantitative - Lavage, Endobronchial [[id number]] Collected: 07/16/21 0945    Lab Status: Final result Specimen: Lavage from Endobronchial Updated: 07/18/21 1012     BAL Culture Light growth (2+) Normal Respiratory Rena: NO S.aureus/MRSA or Pseudomonas aeruginosa     Gram Stain Many (4+) WBCs seen      Few (2+) Gram positive bacilli resembling diphtheroids      Few (2+) Gram positive cocci in pairs and chains      Few (2+) Yeast with hyphae seen    Pneumonia Panel - Lavage, Endobronchial [[id number]]  (Abnormal) Collected: 07/16/21 0945    Lab Status: Final result Specimen: Lavage from Endobronchial Updated: 07/16/21 1538     Escherichia coli PCR Not Detected     Acinetobacter calcoaceticus-baumannii complex PCR Not Detected     Enterobacter cloacae PCR Not Detected     Klebsiella oxytoca PCR Not Detected     Klebsiella pneumoniae group PCR Not Detected     Klebsiella aerogenes PCR Not Detected     Moraxella catarrhalis PCR Not Detected     Proteus species PCR Detected     Comment: 10^5 Bin copies/mL        Pseudomonas aeroginosa PCR Not Detected     Serratia marcescens PCR Not Detected     Staphylococcus aureus PCR Not Detected     Streptococcus pyogenes PCR Not Detected     Haemophilus influenzae PCR Not Detected     Streptococcus agalactiae PCR Not Detected     Streptococcus pneumoniae PCR Not Detected     Chlamydophila pneumoniae PCR Not Detected     Legionella pneumophilia PCR Not Detected     Mycoplasma pneumo by PCR Not Detected     ADENOVIRUS, PCR Not Detected     CTX-M Gene Not Detected     IMP Gene Not Detected      KPC Gene Not Detected     mecA/C and MREJ Gene N/A     NDM Gene Not Detected     OXA-48-like Gene Not Detected     VIM Gene Not Detected     Coronavirus Not Detected     Human Metapneumovirus Not Detected     Human Rhinovirus/Enterovirus Not Detected     Influenza A PCR Not Detected     Influenza B PCR Not Detected     RSV, PCR Not Detected     Parainfluenza virus PCR Not Detected    Gram Stain [[id number]] Collected: 07/16/21 0941    Lab Status: Final result Specimen: Brushing from Lung, Right Lower Lobe Updated: 07/16/21 1421     Gram Stain Occasional Yeast      Moderate (3+) WBCs seen      Occasional Gram positive bacilli resembling diphtheroids    AFB Stain - Brushing, Lung, Right Lower Lobe [[id number]] Collected: 07/16/21 0941    Lab Status: Final result Specimen: Brushing from Lung, Right Lower Lobe Updated: 07/16/21 1521     AFB Stain No acid fast bacilli seen    MRSA Screen, PCR (Inpatient) - Swab, Nares [[id number]]  (Abnormal) Collected: 07/15/21 1103    Lab Status: Final result Specimen: Swab from Nares Updated: 07/15/21 2010     MRSA PCR MRSA Detected    Influenza Antigen, Rapid - Swab, Nasopharynx [[id number]]  (Normal) Collected: 07/15/21 0128    Lab Status: Final result Specimen: Swab from Nasopharynx Updated: 07/15/21 0209     Influenza A Ag, EIA Negative     Influenza B Ag, EIA Negative    COVID PRE-OP / PRE-PROCEDURE SCREENING ORDER (NO ISOLATION) - Swab, Nasopharynx [[id number]]  (Normal) Collected: 07/15/21 0127    Lab Status: Final result Specimen: Swab from Nasopharynx Updated: 07/15/21 1451    Narrative:      The following orders were created for panel order COVID PRE-OP / PRE-PROCEDURE SCREENING ORDER (NO ISOLATION) - Swab, Nasopharynx.  Procedure                               Abnormality         Status                     ---------                               -----------         ------                     COVID-19,CEPHEID,COR/VAN...[[id number]]  Normal              Final result                  Please view results for these tests on the individual orders.    COVID-19,CEPHEID,COR/VAN/PAD/DANNY IN-HOUSE(OR EMERGENT/ADD-ON),NP SWAB IN TRANSPORT MEDIA 3-4 HR TAT, RT-PCR - Swab, Nasopharynx [[id number]]  (Normal) Collected: 07/15/21 0127    Lab Status: Final result Specimen: Swab from Nasopharynx Updated: 07/15/21 1451     COVID19 Not Detected    Narrative:      Fact sheet for providers: https://www.fda.gov/media/544175/download     Fact sheet for patients: https://www.fda.gov/media/081507/download  Fact sheet for providers: https://www.fda.gov/media/429562/download     Fact sheet for patients: https://www.fda.gov/media/148727/download    Blood Culture - Blood, Arm, Left [[id number]] Collected: 07/14/21 1757    Lab Status: Final result Specimen: Blood from Arm, Left Updated: 07/19/21 1815     Blood Culture No growth at 5 days    S. Pneumo Ag Urine or CSF - Urine, Urine, Clean Catch [[id number]]  (Normal) Collected: 07/14/21 1538    Lab Status: Final result Specimen: Urine, Clean Catch Updated: 07/15/21 0732     Strep Pneumo Ag Negative    Legionella Antigen, Urine - Urine, Urine, Clean Catch [[id number]]  (Normal) Collected: 07/14/21 1538    Lab Status: Final result Specimen: Urine, Clean Catch Updated: 07/15/21 0732     LEGIONELLA ANTIGEN, URINE Negative    Blood Culture - Blood, Arm, Right [[id number]] Collected: 07/14/21 1504    Lab Status: Final result Specimen: Blood from Arm, Right Updated: 07/19/21 1530     Blood Culture No growth at 5 days          RADIOLOGY:  Procedure Component Value Units Date/Time   FL Video Swallow With Speech Single Contrast [[id number]] Francois as Reviewed   Order Status: Completed Collected: 07/19/21 1240    Updated: 07/19/21 1244   Narrative:     PROCEDURE: FL VIDEO SWALLOW W SPEECH SINGLE-CONTRAST       COMPARISON: None       INDICATIONS: PNEUMONIA, RULE OUT SILENT ASPIRATION history of lung cancer 2.1 minutes fluoro time,   5.6 mGy, 0 images       TECHNIQUE: Examination was  performed in conjunction with the speech pathologist. The patient was   given both thin and thick liquid barium, applesauce with barium, contrast and keanu cracker with   Esophotrast. The patient's medication list was reviewed and documented in the medical record.       FINDINGS: Deep laryngeal penetration with thin liquids was present.  No aspiration occurred with   any consistency of barium.  Residue within the vallecula and piriform sinuses was noticed with thin   liquids with subsequent spillover and persistent laryngeal penetration from the pharyngeal residue.    No penetration or aspiration occurred with nectar thick liquids, pureed or soft food   consistencies.       CONCLUSION: Deep laryngeal penetration with thin liquids occurred.  Moderate oral pharyngeal   dysphagia.  For further details and recommendations, please refer to the speech pathologist's   report.                  OSMEL HORVATH DO         Electronically Signed and Approved By: OSMEL HORVATH DO on 7/19/2021 at 12:40                      CT Chest Without Contrast Diagnostic [[id number]] Francois as Reviewed   Order Status: Completed Collected: 07/15/21 0106    Updated: 07/15/21 0513   Narrative:     PROCEDURE: CT CHEST WO CONTRAST DIAGNOSTIC       COMPARISONS: [facility], CR, CHEST AP/PA 1 VIEW, 8/31/2012, 5:00.     [facility], CT, CHEST W/O CONTRAST, 5/14/2018, 9:00.     [facility], CR, XR CHEST 1 VW, 7/14/2021, 14:41.     [facility], CT, CHEST W/O CONTRAST, 10/22/2020, 9:24.       INDICATIONS: SHORTNESS OF BREATH; ALTERED MENTAL STATUS; THE PATIENT ALSO HAS A SMALL AREA IN THE   RIGHT LOWER LOBE.  HISTORY OF LUNG CANCER.       TECHNIQUE: 500 CT images were created without the administration of contrast material.         PROTOCOL:   Standard imaging protocol performed       RADIATION:   DLP: 574mGy*cm     Automated exposure control was utilized to minimize radiation dose.       FINDINGS:  There has been right thoracotomy and suspected partial right lower lobectomy.  Please   correlate with the surgical history.  Chronic pleural-parenchymal scarring is seen within the   max-ib-dlejjlxg, posterior, medial right thorax.  There is a suspected chronic pleural density,   suggestive of a fluid collection with an air-fluid level within the posterior, medial pleural   space, as on image 48 of series 2, seen on prior studies.  The fluid has a CT number of 16   Hounsfield units or slightly less.  It may be related to chronic postoperative change.  Again, a   chronic bronchopleural fistula cannot be excluded.  An empyema is possible but thought to be less   likely.  There is associated mineralization of these findings, as well.  There is chronic volume   loss on the right.  There is increased attenuation surrounding the pleural-parenchymal area in the   posterior right thorax which is probably related to treatment effect.  This irregular opacity is   roughly estimated at about 6 x 10.8 x 8 cm in AP (anteroposterior), craniocaudal, and transverse   extent, respectively, as measured on image 47 of series 3 and image 114 of series 603.  It is   probably similar to that seen on the prior 10/22/2020 study.  The previously seen portion of the   posterior, lateral, inferior remaining right lung herniating through a chest wall defect (as on the   5/14/2018 CT study) is no longer appreciated.       No definite acute infiltrate is seen.  No left pleural effusion is identified.  There is an   air-fluid level within the right mainstem bronchus, which may represent retained secretions or   aspirated content.  Mucous plugging is possible.  This finding is thought to be new since the   10/22/2020 CT study.  No pneumothorax is seen on the left.  No pneumomediastinum.  There are   degenerative changes throughout the imaged spine.  There are old healed right-sided rib fractures.     There may be diffuse idiopathic skeletal  hyperostosis (DISH).  The patient has undergone   endovascular aneurysm repair (EVAR) of an abdominal aortic aneurysm, which is partially imaged on   the study and measures about 3.1 cm in greatest diameter.  There is a partially visualized right   hip prosthesis.  There is also partially visualized total right shoulder prosthesis.  No new   suspicious pulmonary nodules are appreciated.  There is probably stable scarring in the right   pulmonary apex since 10/22/2020.  There is a stable nodular opacity in the superior segment of the   left lower lobe, measuring about 1.5 cm, since prior studies dating back to 5/14/2018.       CONCLUSION:       1. There is a new air-fluid level in the right mainstem bronchus with fluid extending into the   remaining right-sided bronchi, thought to represent retained secretions or possibly aspirated   content.  Mucous plugging cannot be excluded.  Bronchoscopy may be helpful in further assessment if   clinically warranted.       2. No new acute infiltrate is seen.  No new suspicious pulmonary nodules are appreciated.         3. No significant interval change is suspected in the pleural-parenchymal opacity in the   hzj-kq-kienpmlu, posterior, medial right thorax, as seen on the prior 10/22/2020 CT study.         4. Otherwise, no significant interval change is seen since the 10/22/2020 study.             ARMIDA HOLLAND JR, MD         Electronically Signed and Approved By: ARMIDA HOLLAND JR, MD on 7/15/2021 at 5:10                      CT Head Without Contrast [[id number]] Francois as Reviewed   Order Status: Completed Collected: 07/14/21 1615    Updated: 07/14/21 1619   Narrative:     PROCEDURE: CT HEAD WO CONTRAST       COMPARISON: None   INDICATIONS: altered mental status       PROTOCOL:   Standard imaging protocol performed       RADIATION:       MA and/or KV was adjusted to minimize radiation dose.           TECHNIQUE: After obtaining the patient's consent, CT images were obtained  without non-ionic   intravenous contrast material.       FINDINGS:   There is moderate diffuse generalized atrophy.  There are multiple areas of low density in the   bilateral periventricular and subcortical white matter consistent with chronic microvascular   ischemic change.  There is no convincing acute hemorrhage.  There are no abnormal extra-axial fluid   collections.  There is no skull fracture.  The mastoid air cells are clear.  There is mild   bilateral ethmoid sinus disease.       CONCLUSION: Moderate atrophy and severe chronic microvascular ischemic change.  No convincing acute   intracranial abnormality.                LIEN BECK MD         Electronically Signed and Approved By: LIEN BECK MD on 7/14/2021 at 16:15                      XR Chest 1 View [[id number]] Francois as Reviewed   Order Status: Completed Collected: 07/14/21 1519    Updated: 07/14/21 1523   Narrative:     PROCEDURE: XR CHEST 1 VW       COMPARISON: [facility], CHEST PA/AP & LAT 2V, 5/28/2020, 9:27.       INDICATIONS: SHORTNESS OF BREATH; DIZZINESS; ALTERED MENTAL STATUS       FINDINGS:   The patient is rotated.  There is stable appearance of the cardiomediastinal silhouette with   tortuosity of the thoracic aorta.  Pulmonary vascularity appears normal.  There is chronic blunting   of the right costophrenic angle which may relate to a tiny effusion or scarring.  The left lung   appears clear.  There is no pneumothorax.  There are degenerative changes of the thoracic spine.     There is a right reverse total shoulder prosthesis.       CONCLUSION:   1. Chronic blunting of the right costophrenic angle which may relate to a tiny effusion or   scarring.   2. Stable tortuosity of the thoracic aorta.                          ABBIE SERRANO MD         Electronically Signed and Approved By: ABBIE SERRANO MD on 7/14/2021 at 15:19                                Labs Pending at Discharge:    Pending Labs     Order Current  Status    Cytomegalovirus (CMV) By PCR - Lavage, Lung, Right Lower Lobe In process    Fungitell B-D Glucan In process    Fungus Culture - Lavage, Endobronchial In process    Fungus Culture - Lavage, Endobronchial In process    HSV 1/2, PCR - Lavage, Lung, Right Lower Lobe In process    Non-gynecologic Cytology In process    Tissue Pathology Exam In process    AFB Culture - Lavage, Endobronchial Preliminary result    AFB Culture - Lavage, Endobronchial Preliminary result    Virus Culture - Lavage, Endobronchial Preliminary result            Time spent on Discharge including face to face service:  46 minutes

## 2021-08-02 PROBLEM — J18.9 PNEUMONIA OF LEFT LOWER LOBE DUE TO INFECTIOUS ORGANISM: Status: ACTIVE | Noted: 2021-01-01

## 2021-08-02 NOTE — H&P
[facility]   HOSPITALIST HISTORY AND PHYSICAL  Date: 2021   Patient Name: Alexander Rouse  : 1935  MRN: [id number]  Primary Care Physician:  Sharif Singer MD  Date of admission: 2021    Subjective   Subjective     Chief Complaint: Shortness of breath    HPI:    Alexander Rouse is a 85 y.o. male with history of COPD, asthma, hyperlipidemia, diastolic CHF who was recently admitted to our facility from - for a lung abscess and right lower lobe postobstructive pneumonia.  [facility]  He was doing well up until today when he became short of breath.  History is obtained by the patient's son and ED physician as the patient is currently confused.  Staff found the patient to be difficult to arouse, they checked his oxygen saturation he was found to be 75% on 4 L nasal cannula.  He was brought to the emergency department where he was transitioned to 8 L high flow with improvement in his saturations.  Patient completed recent antibiotic therapy on  of cefdinir and  of Zyvox.  He is vaccinated against COVID-19.    In the emergency department, patient required 8 L high flow nasal cannula to maintain saturations.  He was tachypneic in the 20s.  Heart rate and blood pressure stable.  Laboratory evaluation revealed elevated troponin at 0.36, however this appears to be chronic for the patient.  There is no leukocytosis.  BMP within normal limits.  Chest x-ray revealed left lower lobe pneumonia.  He was given vancomycin and cefepime in the emergency department and the hospitalist team was contacted to admit for further management.    Personal History     Past Medical History:  Past Medical History:   Diagnosis Date   • Apnea, sleep    • Asthma    • COPD (chronic obstructive pulmonary disease) (CMS/[facility])    • Hyperlipidemia    • TIA (transient ischemic attack)        Past Surgical History:  Past Surgical History:   Procedure Laterality Date   • BRONCHOSCOPY  N/A 7/16/2021    Procedure: BRONCHOSCOPY WITH ENDOBRONCHIAL ULTRASOUND/BRONCHIAL ALVEOLAR LAVAGE/BIOPSIES/BRUSHINGS/WASHINGS;  Surgeon: Artur Randolph MD;  Location: [facility] ENDOSCOPY;  Service: Pulmonary;  Laterality: N/A;  LUNG ABCESS   • CORONARY ANGIOPLASTY WITH STENT PLACEMENT     • LUNG CANCER SURGERY     • SHOULDER SURGERY Bilateral        Family History:   Family History   Family history unknown: Yes       Social History:    reports that he has been smoking. He does not have any smokeless tobacco history on file. He reports that he does not drink alcohol and does not use drugs.    Home Medications:  Selenium, albuterol, apixaban, aspirin, atorvastatin, carvedilol, cetirizine, cholecalciferol, furosemide, guaiFENesin, ipratropium-albuterol, mometasone-formoterol, montelukast, nitroglycerin, and omeprazole    Allergies:  No Known Allergies    Review of Systems   Unable to obtain due to patient status    Objective   Objective     Vitals:   Temp:  [98.7 °F (37.1 °C)] 98.7 °F (37.1 °C)  Heart Rate:  [80-99] 80  Resp:  [22-29] 29  BP: (107-120)/(62-71) 107/62  Flow (L/min):  [4] 4    Physical Exam    Constitutional: Lethargic, opens eyes to sternal rub, quickly falls back asleep.   Eyes: Pupils equal, sclerae anicteric, no conjunctival injection   HENT: NCAT, mucous membranes moist   Neck: Supple, no thyromegaly, no lymphadenopathy, trachea midline   Respiratory: Diminished bilaterally, belly breathing   Cardiovascular: RRR, no murmurs, rubs, or gallops, palpable pedal pulses bilaterally   Gastrointestinal: Positive bowel sounds, soft, nontender, nondistended   Musculoskeletal: No bilateral ankle edema, no clubbing or cyanosis to extremities   Psychiatric: Appropriate affect, cooperative   Neurologic: Oriented x 0, strength symmetric in all extremities, Cranial Nerves grossly intact to confrontation, speech clear   Skin: No rashes     Imaging:   XR Chest 1 View    Result Date: 8/2/2021  PROCEDURE: XR CHEST 1 VW   COMPARISON: [facility], CT, CT CHEST WO CONTRAST, 7/14/2021, 23:29.   [facility], CR, XR CHEST 1 VW, 7/14/2021, 14:41.  INDICATIONS: dyspnea  FINDINGS: A single AP upright portable chest radiograph was performed.  There may be new mild atelectasis and/or infiltrate(s) in left lung base.  Otherwise, no significant interval change is seen since the 7/14/2021 chest radiographic study, including the right thorax.  No cardiac enlargement.  No pneumothorax.  The patient has undergone right shoulder arthroplasty, as before.  Please see the prior 7/14/2021 exam reports for further detail regarding other chronic findings.  CONCLUSION: There may be new mild atelectasis and/or infiltrate(s) in the left lung base since 7/14/2021 at 1441 hours.  Otherwise, no significant change is appreciated.     ARMIDA HOLLAND JR, MD       Electronically Signed and Approved By: ARMIDA HOLLAND JR, MD on 8/02/2021 at 5:04             Result Review    Result Review:  I have personally reviewed the results from the time of this admission to 8/2/2021 06:49 EDT and agree with these findings:  [x]  Laboratory  []  Microbiology  [x]  Radiology  []  EKG/Telemetry   []  Cardiology/Vascular   []  Pathology  []  Old records  []  Other:      Assessment/Plan   Assessment / Plan     Assessment:  Acute hypoxic respiratory failure requiring high flow nasal cannula  Left lower lobe pneumonia   Chronically elevated troponin  COPD, does not appear acutely exacerbated  Chronic diastolic CHF, does not appear volume overloaded  Hyperlipidemia  Recent right lower lobe postobstructive pneumonia with abscess      Plan:  Admit to hospitalist service  Labs and imaging reviewed  Continue vancomycin and cefepime  Continue to trend troponins  Follow blood cultures  Obtain sputum culture, strep pneumo, Legionella, Covid swab  Brovana, Pulmicort, duo nebs  Reconcile and resume appropriate home meds    Patient's clinical course will dictate further  management  Discussed with ED physician, RN          DVT prophylaxis:  No DVT prophylaxis order currently exists.    CODE STATUS:    Limited Support to NOT Include: Intubation  Code Status: No CPR  Medical Interventions (Level of Support Prior to Arrest): Limited      Admission Status:  I believe this patient meets inpatient status.    Electronically signed by BALDOMERO Olea, 08/02/21, 6:49 AM EDT.      Patient and family seen and evaluated, agree with assessment and plan, briefly patient is a 85-year-old male recently to admitted to our facility with pulmonary abscess, he completed antibiotics, he was at [facility] rehab where he had an acute episode of confusion. Patient's mental status is some improved, he is requiring 8 L nasal cannula to maintain saturations above 88%, he is chronically on 2 L nasal cannula. He does have some baseline dementia however his drowsiness and lethargy is worse from this. He underwent chest x-ray which was significant for new mild atelectasis and/or infiltrates in the lung base, we will pursue CT scan head given the infusion, we will pursue CT scan of the chest, he is on broad-spectrum antibiotics, on exam his lungs have by lateral rhonchi, he has bilateral lower extremity edema which his son states is chronic, his abdomen is soft nontender. This speech is very weak. He will need speech therapy evaluation.    Telemetry: Reviewed by me      Electronically signed by Geoffrey Casas MD, 08/02/21, 5:30 PM EDT.

## 2021-08-02 NOTE — PROGRESS NOTES
"Pharmacy to Dose Vancomycin Day: 1    Consulting Provider: TALITA Chowdary  Clinical Indication: pneumonia  Goal -600 mg/L.hr  Duration of therapy: 7 days    182.9 cm (72\")       08/02/21 0412      Weight: 101 kg (221 lb 9 oz)         Estimated Creatinine Clearance: 81 mL/min (by C-G formula based on SCr of 0.82 mg/dL).  Results from last 7 days  Lab  Units  08/02/21  0436  07/28/21  0430  BUN  mg/dL  16  24*  CREATININE  mg/dL  0.82  0.91      HD/PD/CRRT?: N  Lab Results   Component Value Date    WBC 8.71 08/02/2021      Temperature    08/02/21 0412   Temp: 98.7 °F (37.1 °C)          Contrast Administered: N    Relevant Micro:   Microbiology Results (last 10 days)       Procedure Component Value - Date/Time    COVID-19,CEPHEID,COR/VAN/PAD/DANNY IN-HOUSE(OR EMERGENT/ADD-ON),NP SWAB IN TRANSPORT MEDIA 3-4 HR TAT, RT-PCR - Swab, Nasopharynx [[id number]]  (Normal) Collected: 08/02/21 0626    Lab Status: Final result Specimen: Swab from Nasopharynx Updated: 08/02/21 1001     COVID19 Not Detected    Narrative:      Fact sheet for providers: https://www.fda.gov/media/289347/download     Fact sheet for patients: https://www.fda.gov/media/635495/download  Fact sheet for providers: https://www.fda.gov/media/319435/download     Fact sheet for patients: https://www.fda.gov/media/941209/download             Relevant Radiology: Chest XR 8/2 possible new mild atelectasis and or infiltrates left lung base per radiologist.    Other Antimicrobial Therapy: Cefepime    Assessment/Plan  Loading dose: 2000 mg IV once  Regimen: 1000 mg IV every 12 hours.    Predicted parameters at steady state:  Exposure target: AUC24 (range)400-600 mg/L.hr   AUC24,ss: 534 mg/L.hr  PAUC*: 79 %  Ctrough,ss: 18 mg/L  Pconc*: 40 %  Tox.: 14 %      Note: Vancomycin regimen as noted above, will obtain vancomycin level prior to 3rd dose to assess clearance.     Level due: 8/3 @0600  Labs ordered: BMP, Vancomycin trough   "

## 2021-08-02 NOTE — ED PROVIDER NOTES
Time: 3:53 AM EDT  Arrived by: ambulance  Chief Complaint:   Chief Complaint   Patient presents with   • Shortness of Breath     History provided by: pt and EMS  History is limited by: mental status change    History of Present Illness:  Patient is a 85 y.o. year old male that presents to the emergency department with SOB.    Pt denies any pain.  Pt has bacterial pneumonia.       History provided by:  EMS personnel  History limited by:  Mental status change   used: No        Similar Symptoms Previously: none  Recently seen: recently seen in this ED (7/14/2021)    Patient Care Team  Primary Care Provider: Sharif Singer MD    Past Medical History:     No Known Allergies  Past Medical History:   Diagnosis Date   • Apnea, sleep    • Asthma    • COPD (chronic obstructive pulmonary disease) (CMS/HCC)    • Hyperlipidemia    • TIA (transient ischemic attack)      Past Surgical History:   Procedure Laterality Date   • BRONCHOSCOPY N/A 7/16/2021    Procedure: BRONCHOSCOPY WITH ENDOBRONCHIAL ULTRASOUND/BRONCHIAL ALVEOLAR LAVAGE/BIOPSIES/BRUSHINGS/WASHINGS;  Surgeon: Artur Randolph MD;  Location: [facility];  Service: Pulmonary;  Laterality: N/A;  LUNG ABCESS   • CORONARY ANGIOPLASTY WITH STENT PLACEMENT     • LUNG CANCER SURGERY     • SHOULDER SURGERY Bilateral      Family History   Family history unknown: Yes       Home Medications:  Prior to Admission medications    Medication Sig Start Date End Date Taking? Authorizing Provider   albuterol (PROVENTIL) (2.5 MG/3ML) 0.083% nebulizer solution Take 2.5 mg by nebulization every 4 (four) hours as needed for wheezing.    Brenda Lowe MD   apixaban (ELIQUIS) 5 MG tablet tablet Take 5 mg by mouth 2 (Two) Times a Day.    Brenda Lowe MD   aspirin 81 MG tablet Take 81 mg by mouth Daily. 5/30/13   Brenda Lowe MD   atorvastatin (LIPITOR) 80 MG tablet Take 40 mg by mouth Every Night. 4/24/13   Brenda Lowe MD  "  carvedilol (COREG) 3.125 MG tablet Take 3.125 mg by mouth 2 (Two) Times a Day With Meals. 5/30/13   Brenda Lowe MD   cetirizine (zyrTEC) 10 MG tablet Take 10 mg by mouth Daily.    Brenda Lowe MD   cholecalciferol (VITAMIN D3) 25 MCG (1000 UT) tablet Take 1,000 Units by mouth Daily.    Brenda Lowe MD   furosemide (LASIX) 40 MG tablet Take 40 mg by mouth Daily.    Brenda Lowe MD   guaiFENesin (MUCINEX) 600 MG 12 hr tablet Take 1,200 mg by mouth 2 (Two) Times a Day.    Brenda Lowe MD   ipratropium-albuterol (DUO-NEB) 0.5-2.5 mg/3 ml nebulizer Take 3 mL by nebulization Every 4 (Four) Hours As Needed for Wheezing.    Brenda Lowe MD   mometasone-formoterol (DULERA 100) 100-5 MCG/ACT inhaler Inhale 2 puffs 2 (Two) Times a Day.    Brenda Lowe MD   montelukast (SINGULAIR) 10 MG tablet Take 10 mg by mouth Every Night.    Brenda Lowe MD   nitroglycerin (NITROSTAT) 0.4 MG SL tablet Place 1 tablet under the tongue Every 5 (Five) Minutes As Needed for Chest Pain. Take no more than 3 doses in 15 minutes. 7/20/21   Jacky Hewitt MD   omeprazole (priLOSEC) 40 MG capsule Take 40 mg by mouth Daily.    Brenda Lowe MD   Selenium 200 MCG tablet Take 200 mcg by mouth.    Brenda Lowe MD        Social History:   Social History     Tobacco Use   • Smoking status: Current Every Day Smoker   Substance Use Topics   • Alcohol use: No   • Drug use: No          Review of Systems:  Review of Systems   Unable to perform ROS: Mental status change        Physical Exam:  /62   Pulse 80   Temp 98.7 °F (37.1 °C)   Resp (!) 29   Ht 182.9 cm (72\")   Wt 101 kg (221 lb 9 oz)   SpO2 96%   BMI 30.05 kg/m²     Physical Exam  Vitals and nursing note reviewed.   Constitutional:       Appearance: Normal appearance. He is well-developed. He is ill-appearing.   HENT:      Head: Normocephalic and atraumatic.      Right Ear: External ear normal.      " Left Ear: External ear normal.      Nose: Nose normal.      Mouth/Throat:      Mouth: Mucous membranes are moist.      Pharynx: Oropharynx is clear.   Eyes:      General: Lids are normal.      Extraocular Movements: Extraocular movements intact.      Conjunctiva/sclera: Conjunctivae normal.      Pupils: Pupils are equal, round, and reactive to light.   Cardiovascular:      Rate and Rhythm: Normal rate and regular rhythm.      Pulses: Normal pulses.      Heart sounds: Normal heart sounds. No murmur heard.     Pulmonary:      Effort: Pulmonary effort is normal. No respiratory distress.      Breath sounds: Normal breath sounds. No stridor. No wheezing.   Abdominal:      Palpations: Abdomen is soft.      Tenderness: There is no abdominal tenderness.   Musculoskeletal:         General: Normal range of motion.      Cervical back: Full passive range of motion without pain, normal range of motion and neck supple.      Right lower leg: No edema.      Left lower leg: No edema.   Skin:     General: Skin is warm and dry.      Capillary Refill: Capillary refill takes less than 2 seconds.   Neurological:      General: No focal deficit present.      Mental Status: He is alert and oriented to person, place, and time. Mental status is at baseline.      Cranial Nerves: Cranial nerves are intact.      Sensory: Sensation is intact.      Motor: Motor function is intact.      Coordination: Coordination is intact.      Comments: Pt is debilitated, bedridden, and is sleeping.                 Medications in the Emergency Department:  Medications   sodium chloride 0.9 % flush 10 mL (has no administration in time range)   vancomycin 2000 mg/500 mL 0.9% NS IVPB ([facility]) (has no administration in time range)   cefepime (MAXIPIME) 2 g/100 mL 0.9% NS (mbp) (2 g Intravenous New Bag 8/2/21 7469)        Labs  Lab Results (last 24 hours)     Procedure Component Value Units Date/Time    CBC & Differential [[id number]]  (Abnormal) Collected: 08/02/21 5220     Specimen: Blood Updated: 08/02/21 0508    Narrative:      The following orders were created for panel order CBC & Differential.  Procedure                               Abnormality         Status                     ---------                               -----------         ------                     CBC Auto Differential[[id number]]        Abnormal            Final result               Scan Slide[[id number]]                                       Final result                 Please view results for these tests on the individual orders.    Comprehensive Metabolic Panel [[id number]]  (Abnormal) Collected: 08/02/21 0436    Specimen: Blood Updated: 08/02/21 0508     Glucose 98 mg/dL      BUN 16 mg/dL      Creatinine 0.82 mg/dL      Sodium 141 mmol/L      Potassium 3.4 mmol/L      Chloride 98 mmol/L      CO2 36.7 mmol/L      Calcium 8.4 mg/dL      Total Protein 5.4 g/dL      Albumin 2.90 g/dL      ALT (SGPT) 16 U/L      AST (SGOT) 18 U/L      Alkaline Phosphatase 90 U/L      Total Bilirubin 0.5 mg/dL      eGFR Non African Amer 89 mL/min/1.73      Globulin 2.5 gm/dL      A/G Ratio 1.2 g/dL      BUN/Creatinine Ratio 19.5     Anion Gap 6.3 mmol/L     Narrative:      GFR Normal >60  Chronic Kidney Disease <60  Kidney Failure <15      Lactic Acid, Plasma [[id number]]  (Normal) Collected: 08/02/21 0436    Specimen: Blood Updated: 08/02/21 0507     Lactate 0.8 mmol/L     Blood Culture - Blood, Arm, Left [[id number]] Collected: 08/02/21 0436    Specimen: Blood from Arm, Left Updated: 08/02/21 0444    Blood Culture - Blood, Arm, Left [[id number]] Collected: 08/02/21 0436    Specimen: Blood from Arm, Left Updated: 08/02/21 0444    BNP [[id number]]  (Normal) Collected: 08/02/21 0436    Specimen: Blood Updated: 08/02/21 0506     proBNP 1,230.0 pg/mL     Narrative:      Among patients with dyspnea, NT-proBNP is highly sensitive for the detection of acute congestive heart failure. In addition NT-proBNP of <300 pg/ml effectively rules out  acute congestive heart failure with 99% negative predictive value.    Results may be falsely decreased if patient taking Biotin.      Troponin [[id number]]  (Abnormal) Collected: 08/02/21 0436    Specimen: Blood Updated: 08/02/21 0521     Troponin T 0.036 ng/mL     Narrative:      Troponin T Reference Range:  <= 0.03 ng/mL-   Negative for AMI  >0.03 ng/mL-     Abnormal for myocardial necrosis.  Clinicians would have to utilize clinical acumen, EKG, Troponin and serial changes to determine if it is an Acute Myocardial Infarction or myocardial injury due to an underlying chronic condition.       Results may be falsely decreased if patient taking Biotin.      CBC Auto Differential [[id number]]  (Abnormal) Collected: 08/02/21 0436    Specimen: Blood Updated: 08/02/21 0508     WBC 8.71 10*3/mm3      RBC 3.33 10*6/mm3      Hemoglobin 8.0 g/dL      Hematocrit 26.3 %      MCV 79.0 fL      MCH 24.0 pg      MCHC 30.4 g/dL      RDW 22.6 %      RDW-SD 62.9 fl      MPV 10.4 fL      Platelets 193 10*3/mm3      Neutrophil % 71.6 %      Lymphocyte % 9.8 %      Monocyte % 16.3 %      Eosinophil % 0.5 %      Basophil % 0.2 %      Immature Grans % 1.6 %      Neutrophils, Absolute 6.24 10*3/mm3      Lymphocytes, Absolute 0.85 10*3/mm3      Monocytes, Absolute 1.42 10*3/mm3      Eosinophils, Absolute 0.04 10*3/mm3      Basophils, Absolute 0.02 10*3/mm3      Immature Grans, Absolute 0.14 10*3/mm3      nRBC 0.2 /100 WBC     Scan Slide [[id number]] Collected: 08/02/21 0436    Specimen: Blood Updated: 08/02/21 0508     Anisocytosis Mod/2+     Crenated RBC's Slight/1+     Hypochromia Slight/1+     Microcytes Slight/1+     Ovalocytes Slight/1+     Polychromasia Slight/1+     WBC Morphology Normal     Platelet Morphology Normal    COVID-19,CEPHEID,COR/VAN/PAD/DANNY IN-HOUSE(OR EMERGENT/ADD-ON),NP SWAB IN TRANSPORT MEDIA 3-4 HR TAT, RT-PCR - Swab, Nasopharynx [[id number]] Collected: 08/02/21 0626    Specimen: Swab from Nasopharynx Updated: 08/02/21  0633           Imaging:  XR Chest 1 View    Result Date: 8/2/2021  PROCEDURE: XR CHEST 1 VW  COMPARISON: [facility], CT, CT CHEST WO CONTRAST, 7/14/2021, 23:29.   [facility], CR, XR CHEST 1 VW, 7/14/2021, 14:41.  INDICATIONS: dyspnea  FINDINGS: A single AP upright portable chest radiograph was performed.  There may be new mild atelectasis and/or infiltrate(s) in left lung base.  Otherwise, no significant interval change is seen since the 7/14/2021 chest radiographic study, including the right thorax.  No cardiac enlargement.  No pneumothorax.  The patient has undergone right shoulder arthroplasty, as before.  Please see the prior 7/14/2021 exam reports for further detail regarding other chronic findings.  CONCLUSION: There may be new mild atelectasis and/or infiltrate(s) in the left lung base since 7/14/2021 at 1441 hours.  Otherwise, no significant change is appreciated.     ARMIDA HOLLAND JR, MD       Electronically Signed and Approved By: ARMIDA HOLLAND JR, MD on 8/02/2021 at 5:04               Procedures:  Procedures    Progress                            Medical Decision Making:  MDM  Number of Diagnoses or Management Options  Acute on chronic respiratory failure with hypoxia (CMS/HCC): new and requires workup  Pneumonia of left lower lobe due to infectious organism: new and requires workup  Diagnosis management comments: Patient with likely left lower lobe pneumonia treated with broad-spectrum antibiotics in the emergency department.  Patient evaluated by respiratory therapy was able to suction the patient's upper airways and placed on high flow nasal cannula with improved oxygenation.  Patient's mental status remains lethargic although he is able to answer simple one-word questions.  The patient´s CBC was reviewed and shows no abnormalities of critical concern. Of note, there is no anemia requiring a blood transfusion and the platelet count is acceptable.  The patient´s CMP was  reviewed and shows no abnormalities of critical concern. Of note, the patient´s sodium and potassium are acceptable. The patient´s liver enzymes are unremarkable. The patient´s renal function (creatinine) is preserved. The patient has a normal anion gap.  Troponin appears mildly elevated although this appears patient's baseline.  ECG shows no acute ischemia.  The patient's airway is intact, vital signs, and respiratory status are safe for admission at this time.       Amount and/or Complexity of Data Reviewed  Clinical lab tests: ordered and reviewed  Tests in the radiology section of CPT®: ordered and reviewed  Decide to obtain previous medical records or to obtain history from someone other than the patient: yes  Discuss the patient with other providers: yes    Total Critical Care time of 31  minutes. Total critical care time documented does not include time spent on separately billed procedures for services of nurses or physician assistants. I personally saw and examined the patient. I have reviewed all diagnostic interpretations and treatment plans as written. I was present for the key portions of any procedures performed and the inclusive time noted in any critical care statement. Critical care time includes patient management by me, time spent at the patients bedside, time to review lab and imaging results, discussing patient care, documentation in the medical record, and time spent with family or caregiver.    Final diagnoses:   Pneumonia of left lower lobe due to infectious organism   Acute on chronic respiratory failure with hypoxia (CMS/HCC)        Disposition:  ED Disposition     ED Disposition Condition Comment    Decision to Admit  Level of Care: Telemetry [5]   Diagnosis: Pneumonia of left lower lobe due to infectious organism [[id number]]   Admitting Physician: BELLE JOHNSON [[id number]]   Attending Physician: BELLE JOHNSON [[id number]]   Isolate for COVID?: Yes [1]   Certification: I Certify That  Inpatient Hospital Services Are Medically Necessary For Greater Than 2 Midnights            Documentation assistance provided by Kayce Correia acting as scribe for Mitch Ramirez MD. Information recorded by the scribe was done at my direction and has been verified and validated by me.          Kayce Correia  08/02/21 8162       Mitch Ramirez MD  08/02/21 8191

## 2021-08-03 NOTE — CASE MANAGEMENT/SOCIAL WORK
Patient admitted to [facility] 7/14/21 - 7/20/21 (Met per 2MN presumption); discharged to [facility] on 7/20/21.

## 2021-08-03 NOTE — PROGRESS NOTES
"Pharmacy to Dose Vancomycin Day: 2    Consulting Provider: TALITA Chowdary  Clinical Indication: pneumonia  Goal -600 mg/L.hr  Duration of therapy: 7 days    182.9 cm (72\")       08/02/21 0412      Weight: 101 kg (221 lb 9 oz)         Estimated Creatinine Clearance: 79.1 mL/min (by C-G formula based on SCr of 0.84 mg/dL).    Results from last 7 days  Lab  Units  08/03/21  0519  08/02/21 0436  07/28/21  0430  BUN  mg/dL  17  16  24*  CREATININE  mg/dL  0.84  0.82  0.91    HD/PD/CRRT?: No    Lab Results   Component Value Date    WBC 8.71 08/02/2021      Temperature    08/02/21 2008 08/02/21 2359 08/03/21 0435   Temp: 98.1 °F (36.7 °C) 98.2 °F (36.8 °C) 97.7 °F (36.5 °C)     Contrast Administered: N    Relevant Micro:   Microbiology Results (last 10 days)       Procedure Component Value - Date/Time    COVID-19,CEPHEID,COR/VAN/PAD/DANNY IN-HOUSE(OR EMERGENT/ADD-ON),NP SWAB IN TRANSPORT MEDIA 3-4 HR TAT, RT-PCR - Swab, Nasopharynx [[id number]]  (Normal) Collected: 08/02/21 0626    Lab Status: Final result Specimen: Swab from Nasopharynx Updated: 08/02/21 1001     COVID19 Not Detected    Narrative:      Fact sheet for providers: https://www.fda.gov/media/826939/download     Fact sheet for patients: https://www.fda.gov/media/521090/download  Fact sheet for providers: https://www.fda.gov/media/031664/download     Fact sheet for patients: https://www.fda.gov/media/533597/download    Blood Culture - Blood, Arm, Left [[id number]] Collected: 08/02/21 0436    Lab Status: Preliminary result Specimen: Blood from Arm, Left Updated: 08/03/21 0445     Blood Culture No growth at 24 hours    Blood Culture - Blood, Arm, Left [[id number]] Collected: 08/02/21 0436    Lab Status: Preliminary result Specimen: Blood from Arm, Left Updated: 08/03/21 0445     Blood Culture No growth at 24 hours           Blood Culture   Date Value Ref Range Status   08/02/2021 No growth at 24 hours  Preliminary   08/02/2021 No growth at 24 hours  Preliminary "     No results found for: BCIDPCR, CXREFLEX, CSFCX, CULTURETIS  No results found for: CULTURES, HSVCX, URCX  No results found for: EYECULTURE, GCCX, HSVCULTURE, LABHSV  No results found for: LEGIONELLA, MRSACX, MUMPSCX, MYCOPLASCX  No results found for: NOCARDIACX, STOOLCX  No results found for: THROATCX, UNSTIMCULT, URINECX, CULTURE, VZVCULTUR  No results found for: VIRALCULTU, WOUNDCX    Relevant Radiology: Chest XR 8/2 possible new mild atelectasis and or infiltrates left lung base per radiologist.    Other Antimicrobial Therapy: Cefepime    Assessment/Plan  Regimen: 1500 mg IV every 24 hours.  Start time: 08:07 on 08/03/2021  Exposure target: AUC24 (range)400-600 mg/L.hr   AUC24,ss: 462 mg/L.hr  PAUC*: 76 %  Ctrough,ss: 12.8 mg/L  Pconc*: 8 %  Tox.: 8 %    VANCOMYCIN LEVEL THIS AM REPORTED AS 18.53.  WILL ADJUST DOSE TO 1500MG [id number] STARTING TOMORROW AM PER [facility] RECOMMENDATION.  BMP TOMORROW.  RECOMMEND FOLLOW UP VANCOMYCIN LEVEL IN THE NEXT FEW DAYS.

## 2021-08-03 NOTE — PLAN OF CARE
Goal Outcome Evaluation:  Plan of Care Reviewed With: patient  Dysphagia evaluation completed at bedside.  Patient exhibiting signs symptoms of aspiration with thin liquids.  Recommend diet mechanical soft solids and nectar thickened liquids.  Patient also had completed previous modified barium swallow study on 7/19/2021 resulting in deep laryngeal penetration of thin liquids.  Speech therapy to follow for dysphagia.

## 2021-08-03 NOTE — SIGNIFICANT NOTE
Pt has bruising noted to right great toe, denies fall  Open area noted to lateral LLE also consistent with trauma or broken blister  Redness noted to Bilateral shins with dry skin noted    Redness to sacral/gluteal cleft with increased redness and rash noted to perianal/perineum and down posterior thighs along gluteal folds

## 2021-08-03 NOTE — PROGRESS NOTES
Pulmonary / Critical Care Consult Note      Patient Name: Alexander Rouse  : 1935  MRN: [id number]  Primary Care Physician:  Sharif Singer MD  Referring Physician: No ref. provider found  Date of admission: 2021    Subjective   Subjective     Reason for Consult/ Chief Complaint: Hypoxic respiratory failure    HPI:  Alexander Rouse is a 85 y.o. male  with past non-small cell lung cancer s/p RUL resection in , COPD, diastolic heart failure, afib on anticoagulation with Eliquis presented to the ED from [facility] with complaints of shortness of breath and altered mental status for 1 day.  He was recently hospitalized in this facility 2 weeks ago and underwent bronchoscopy for mucous plugging and lung abscess of RLL causing postobstructive pneumonia.  BAL cultures grew Proteus and he completed course of Cefdinir.. He also completed course of Zyvox for MRSA PCR +.  In the ED he was found to by hypoxic with O2 sats in the 70's on 4L NC and was placed on HFNC.  WBC 7.87 and Procal 0.1.  CXR revealed new infiltrate to right lung base.  CT chest was performed and revealed worsening RLL infiltrates and 9mm noncalcified LLL nodule unchanged from previous study on 2019.  Because of the above our services was consulted for further evaluation and treatment.  Upon exam he is lethargic.  He is able to answer some simple questions but quickly drifts back off to sleep.  He is on 7L HFNC with O2 sats 93%.  He is very lethargic and minimally responsive to painful stimuli.    Review of Systems  Unable to obtain due to altered mental status    Personal History     Past Medical History:   Diagnosis Date   • Apnea, sleep    • Asthma    • COPD (chronic obstructive pulmonary disease) (CMS/HCC)    • Hyperlipidemia    • Hypertension    • TIA (transient ischemic attack)        Past Surgical History:   Procedure Laterality Date   • BRONCHOSCOPY N/A 2021    Procedure: BRONCHOSCOPY WITH ENDOBRONCHIAL  ULTRASOUND/BRONCHIAL ALVEOLAR LAVAGE/BIOPSIES/BRUSHINGS/WASHINGS;  Surgeon: Artur Randolph MD;  Location: [facility] ENDOSCOPY;  Service: Pulmonary;  Laterality: N/A;  LUNG ABCESS   • CARDIAC SURGERY     • CORONARY ANGIOPLASTY WITH STENT PLACEMENT     • LUNG CANCER SURGERY     • SHOULDER SURGERY Bilateral        Family History:    No family history of chronic lung disease or lung cancer. No premature coronary artery disease.       Social History:  reports that he quit smoking about 3 months ago. He does not have any smokeless tobacco history on file. He reports that he does not drink alcohol and does not use drugs.    Home Medications:  Selenium, albuterol, apixaban, aspirin, atorvastatin, carvedilol, cetirizine, cholecalciferol, furosemide, guaiFENesin, ipratropium-albuterol, mometasone-formoterol, montelukast, nitroglycerin, omeprazole, pantoprazole, and saccharomyces boulardii    Allergies:  No Known Allergies    Objective    Objective     Vitals:   Temp:  [97.6 °F (36.4 °C)-98.2 °F (36.8 °C)] 97.6 °F (36.4 °C)  Heart Rate:  [74-99] 79  Resp:  [16-22] 20  BP: ()/(62-74) 119/70  Flow (L/min):  [7-10] 7    Physical Exam:  Vital Signs Reviewed   General: WDWN male, Lehtargic, opens eyes to stimulation and answers simple questions but quickly goes back to sleep, NAD on 7L HFNC    HEENT:  PERRL, EOMI.  OP, nares clear, no sinus tenderness  Neck:  Supple, no JVD, no thyromegaly  Lymph: no axillary, cervical, supraclavicular lymphadenopathy noted bilaterally  Chest:  poor aeration, coarse crackles and rhonchi on right, tympanic to percussion bilaterally, no work of breathing noted  CV: RRR, no MGR, pulses 2+, equal  Abd:  Soft, NT, ND, + BS, no HSM  EXT:  no clubbing, no cyanosis, no edema, no joint tenderness  Neuro:  Lethargic, moves all 4 extremities, CN grossly intact, no focal deficits  Skin: No rashes or lesions noted    Result Review    Result Review:  I have personally reviewed the results from the time of  this admission to 8/3/2021 10:29 EDT and agree with these findings:  [x]  Laboratory  [x]  Microbiology  [x]  Radiology  [x]  EKG/Telemetry   []  Cardiology/Vascular   []  Pathology  [x]  Old records  []  Other:  Most notable findings include: WBC 7.87, Procal 0.1, Hgb 8.8, Cr 0.84, K+ 3.4, Mg+ 1.7, PO4 3.3, troponin 0.03.  CXR with new infiltrates to right lung base since previous CXR on 7/14/21.  CT chest with worsening RLL infiltrates, 9mm non-calcified LLL nodule unchanged from study on 5/2019.  Reviewed previous CXR and CT scans.    Assessment/Plan   Assessment / Plan     Active Hospital Problems:  Active Hospital Problems    Diagnosis    • Pneumonia of left lower lobe due to infectious organism      Impression:  Acute hypoxic respiratory failure requiring HFNC  Recurrent RLL pneumonia  Recent Proteus and MRSA pneumonia  Question aspiration related  Hx of NSCL lung cancer with RUL resection in 2012  Diastolic heart failure   COPD with acute exacerbation    Plan:  CXR and CT chest reviewed.  May benefit from bronchoscopy once his oxygenation improves.  Continue supplemental O2 to keep sats > 90%, wean as tolerated.  Will check ABGs --> 7.4, 64, 90, 39.  Do BiPAP 14/7 if needed.  Negative COVID.  Pending blood cultures, sputum, MRSA PCR, and urinary antigen for strep and legionella.  Continue Cefepime and Vancomycin.  Will de-escalate based off cultures.  Continue triple nebulizers with Brovana, Pulmicort, and Duonebs.  Will start bronchopulmonary hygiene.  Add percussion therapy.    Encourage IS and flutter valve when mentation better.  Continue Solu-medrol.  Continue diuresis with Lasix.  Speech therapy consult for possible aspiration.  Given his overall status, he has very poor prognosis.    Labs, microbiology, radiology, medications, and provider notes personally reviewed.  Discussed with primary service and bedside RN.    Thank you for this consult and allowing me to participate in the care of   Padma.    Electronically signed by JULIANNA Vila, 08/03/21, 12:34 PM EDT.  Electronically signed by Juancarlos Spring MD, 8/3/2021, 10:29 EDT.

## 2021-08-03 NOTE — THERAPY EVALUATION
Acute Care - Speech Language Pathology   Swallow Initial Evaluation  [facility]     Patient Name: Alexander Rouse  : 1935  MRN: [id number]  Today's Date: 8/3/2021               Admit Date: 2021    Visit Dx:     ICD-10-CM ICD-9-CM   1. Pneumonia of left lower lobe due to infectious organism  J18.9 486   2. Acute on chronic respiratory failure with hypoxia (CMS/[facility])  J96.21 518.84     799.02   3. Oropharyngeal dysphagia  R13.12 787.22     Patient Active Problem List   Diagnosis   • CAFL (chronic airflow limitation) (CMS/[facility])   • Cough   • Cancer of lower lobe of lung (CMS/[facility])   • Disease of larynx   • Rotator cuff tear arthropathy   • Status post complete repair of rotator cuff   • Nodule of right lung   • Troponin level elevated   • Hypothermia   • Altered mental status   • Abnormal chest CT   • Cavitary lesion of lung   • Pneumonia of left lower lobe due to infectious organism     Past Medical History:   Diagnosis Date   • Apnea, sleep    • Asthma    • COPD (chronic obstructive pulmonary disease) (CMS/[facility])    • Hyperlipidemia    • Hypertension    • TIA (transient ischemic attack)      Past Surgical History:   Procedure Laterality Date   • BRONCHOSCOPY N/A 2021    Procedure: BRONCHOSCOPY WITH ENDOBRONCHIAL ULTRASOUND/BRONCHIAL ALVEOLAR LAVAGE/BIOPSIES/BRUSHINGS/WASHINGS;  Surgeon: Artur Randolph MD;  Location: [facility] ENDOSCOPY;  Service: Pulmonary;  Laterality: N/A;  LUNG ABCESS   • CARDIAC SURGERY     • CORONARY ANGIOPLASTY WITH STENT PLACEMENT     • LUNG CANCER SURGERY     • SHOULDER SURGERY Bilateral        SLP Recommendation and Plan  SLP Swallowing Diagnosis: moderate  SLP Diet Recommendation: mechanical soft with no mixed consistencies, nectar thick liquids  Recommended Precautions and Strategies: upright posture during/after eating, small bites of food and sips of liquid, assist with feeding  SLP Rec. for Method of Medication Administration: meds crushed, with pudding or applesauce     Monitor  for Signs of Aspiration: yes     Swallow Criteria for Skilled Therapeutic Interventions Met: demonstrates skilled criteria  Anticipated Discharge Disposition (SLP): inpatient rehabilitation facility, skilled nursing facility  Rehab Potential/Prognosis, Swallowing: adequate, monitor progress closely  Therapy Frequency (Swallow): daily, 5 days per week  Predicted Duration Therapy Intervention (Days): until discharge                         Plan of Care Reviewed With: patient         SWALLOW EVALUATION (last 72 hours)      SLP Adult Swallow Evaluation     Row Name 08/03/21 1221                   Rehab Evaluation    Document Type  evaluation  -SN        Subjective Information  no complaints  -SN        Patient Observations  alert;cooperative  -SN           Clinical Impression    SLP Swallowing Diagnosis  moderate  -SN        Functional Impact  risk of aspiration/pneumonia  -SN        Rehab Potential/Prognosis, Swallowing  adequate, monitor progress closely  -SN        Swallow Criteria for Skilled Therapeutic Interventions Met  demonstrates skilled criteria  -SN           Recommendations    Therapy Frequency (Swallow)  daily;5 days per week  -SN        Predicted Duration Therapy Intervention (Days)  until discharge  -SN        SLP Diet Recommendation  mechanical soft with no mixed consistencies;nectar thick liquids  -SN        Recommended Precautions and Strategies  upright posture during/after eating;small bites of food and sips of liquid;assist with feeding  -SN        Oral Care Recommendations  Oral Care BID/PRN  -SN        SLP Rec. for Method of Medication Administration  meds crushed;with pudding or applesauce  -SN        Monitor for Signs of Aspiration  yes  -SN          User Key  (r) = Recorded By, (t) = Taken By, (c) = Cosigned By    Initials Name Effective Dates    Ashley Lawler, MAGALIS 03/31/21 -             Inpatient Speech Pathology Dysphagia Evaluation        PAIN SCALE: None  indicated    PRECAUTIONS/CONTRAINDICATIONS: Standard, contact    SUSPECTED ABUSE/NEGLECT/EXPLOITATION: None identified    SOCIAL/PSYCHOLOGICAL NEEDS/BARRIERS: None identified    PAST SOCIAL HISTORY: 85-year-old male, undergoing rehabilitation at [facility]    PRIOR FUNCTION: On p.o. diet    PATIENT GOALS/EXPECTATIONS: None indicated by the patient    HISTORY: Patient is referred for speech pathology dysphagia evaluation secondary to concerns of aspiration.  Patient underwent an outpatient modified barium swallow study at [facility] on 7/19/2021 resulting in deep laryngeal penetration of thin liquids.  Diet of mechanical soft solids and nectar thickened liquids was recommended by the speech pathologist.  It is unknown patient's current diet at [facility].  Lung sounds are recorded with coarse rhonchi.  Patient is currently on continuous pulse ox and on nasal cannula.    CURRENT DIET LEVEL: Upon admission, diet ordered as regular, thin liquids    OBJECTIVE:    TEST ADMINISTERED: Clinical dysphagia evaluation    COGNITION/SAFETY AWARENESS: Likely impaired    BEHAVIORAL OBSERVATIONS: Patient was awake, cooperative, delayed in following commands    ORAL MOTOR EXAM: Natural dentition however dry oral mucosa    VOICE QUALITY: Intermittently aphonic    REFLEX EXAM: Congested cough    POSTURE: Total assistance    FEEDING/SWALLOWING FUNCTION: Assessed with thin liquids, nectar thickened liquids, puréed solids and mechanical soft solids    CLINICAL OBSERVATIONS: Patient with laryngeal wetness with trials of thin liquids via spoon.  Delayed cough x1.  Nectar thick liquids by spoon with delayed swallow completed.  Laryngeal sounds clear to auscultation.  Nectar thickened liquids by hand over hand assist for cup drink.  Delayed swallow completed.  Laryngeal sounds clear to auscultation.  Purée solids with decreased bolus propulsion with delayed swallow.  Double swallow observed.  Mechanical  soft solids with anterior chewing.  Decreased bolus transit.  Delayed swallow completed with double swallow however laryngeal sounds clear to auscultation.  Laryngeal elevation on palpation.  Patient exhibiting swallow delay with consistencies at bedside.  Sign symptoms aspiration with thin liquids.  Silent aspiration cannot be ruled out.    DYSPHAGIA CRITERIA: Risk of aspiration, swallow delay    FUNCTIONAL ASSESSMENT INSTRUMENT: Patient currently scored a level 4 of 7 on Functional Communication Measures for swallowing indicating a 40-59% limitation in function.    ASSESSMENT/ PLAN OF CARE:  Pt presents with limitations, noted below, that impede patient's ability to tolerate least restrictive diet safely and independently. The skills of a therapist will be required to safely and effectively implement the following treatment plan to restore maximal level of function.    PROBLEMS:  1.  Risk of aspiration, swallow delay   LTG 1: 30 days.  Patient will increase functional communication measures for swallowing to level 5 of 7, indicating a 20-39% limitation in function.   STG 1a: 14 days.  Patient will tolerate least restrictive diet of mechanical soft solids, nectar thickened liquids with minimal to no signs or symptoms of aspiration.   STG 1b: 14 days.  Patient will tolerate least restrictive diet of mechanical soft solids, nectar thick liquids utilizing strategies with moderate cues.   STG 1c: 14 days.  Patient/staff/caregiver education.   TREATMENT: Speech therapy for dysphagia, education of strategies and tolerance of least restrictive diet.      FREQUENCY/DURATION: Twice daily, 5 days a week    REHAB POTENTIAL:  Pt has good rehab potential.  The following limitations may influence improvement/ length of tx medical status.    RECOMMENDATIONS:   1.   DIET: Mechanical soft solids, nectar thick liquids    2.  POSITION: Fully upright for all p.o. intake, 30 minutes following    3.  COMPENSATORY STRATEGIES: One-on-one  feed, small bites and sips, crushed meds in applesauce    Thank you for this referral.    Pt/responsible party agrees with plan of care and has been informed of all alternatives, risks and benefits.  EDUCATION  The patient has been educated in the following areas:   Dysphagia (Swallowing Impairment).              Time Calculation:   Time Calculation- SLP     Row Name 08/03/21 1226             Time Calculation- SLP    SLP Start Time  1200  -SN      SLP Received On  08/03/21  -SN         Untimed Charges    21434-DW Eval Oral Pharyng Swallow Minutes  60  -SN         Total Minutes    Untimed Charges Total Minutes  60  -SN       Total Minutes  60  -SN        User Key  (r) = Recorded By, (t) = Taken By, (c) = Cosigned By    Initials Name Provider Type    SN Ashley Quintana SLP Speech and Language Pathologist          Therapy Charges for Today     Code Description Service Date Service Provider Modifiers Qty    [id number]  ST EVAL ORAL PHARYNG SWALLOW 4 8/3/2021 Ashley Quintana SLP GN 1               MAGALIS Kebede  8/3/2021

## 2021-08-03 NOTE — PROGRESS NOTES
[facility]   Hospitalist Progress Note  Date: 8/3/2021  Patient Name: Alexander Rouse  : 1935  MRN: [id number]  Date of admission: 2021      Subjective   Subjective     Chief Complaint: Shortness of breath    Summary: 85 y.o. male with history of COPD, asthma, hyperlipidemia, diastolic CHF who was recently admitted to our facility from - for a lung abscess and right lower lobe postobstructive pneumonia.  [facility]  He was doing well up until today when he became short of breath.  History is obtained by the patient's son and ED physician as the patient is currently confused.  Staff found the patient to be difficult to arouse, they checked his oxygen saturation he was found to be 75% on 4 L nasal cannula.  He was brought to the emergency department where he was transitioned to 8 L high flow with improvement in his saturations.  Patient completed recent antibiotic therapy on  of cefdinir and  of Zyvox.  He is vaccinated against COVID-19.     In the emergency department, patient required 8 L high flow nasal cannula to maintain saturations.  He was tachypneic in the 20s.  Heart rate and blood pressure stable.  Laboratory evaluation revealed elevated troponin at 0.36, however this appears to be chronic for the patient.  There is no leukocytosis.  BMP within normal limits.  Chest x-ray revealed left lower lobe pneumonia.  He was given vancomycin and cefepime in the emergency department and the hospitalist team was contacted to admit for further management.     Interval Followup: Patient is a little more confused this morning and somnolent.  He wakes up to voice but can really not answer any of my questions appropriately.  His oxygen saturations have been adequate.    Review of Systems   A 10 point review of systems was attempted to be obtained but could not due to the patient's mentation    Objective   Objective     Vitals:   Temp:  [97.3 °F (36.3 °C)-98.2  °F (36.8 °C)] 97.3 °F (36.3 °C)  Heart Rate:  [74-99] 74  Resp:  [16-22] 18  BP: ()/(62-74) 140/74  Flow (L/min):  [5-10] 5  Physical Exam    Constitutional: Awake, falls asleep easily, no acute distress   Eyes: Pupils equal, sclerae anicteric, no conjunctival injection   HENT: NCAT, mucous membranes moist   Neck: Supple, no thyromegaly, no lymphadenopathy, trachea midline   Respiratory: Rhonchorous breath sounds in bilateral bases, expiratory wheezing noted in bilateral bases, nasal cannula in place, nonlabored respirations    Cardiovascular: RRR, no murmurs, rubs, or gallops   Gastrointestinal: Positive bowel sounds, soft, nontender, nondistended   Musculoskeletal: No bilateral ankle edema, no clubbing or cyanosis to extremities   Psychiatric: Appropriate affect, cooperative   Neurologic: Oriented x 3, strength symmetric in all extremities, Cranial Nerves grossly intact to confrontation, speech clear   Skin: No rashes     Result Review    Result Review:  I have personally reviewed the results from the time of this admission to 8/3/2021 15:36 EDT and agree with these findings:  [x]  Laboratory  [x]  Microbiology  [x]  Radiology  [x]  EKG/Telemetry   []  Cardiology/Vascular   []  Pathology  []  Old records  []  Other:  Telemetry reviewed showed normal sinus rhythm    Assessment/Plan   Assessment / Plan     Assessment/Plan:  Acute hypoxic and hypercapnic respiratory failure requiring high flow nasal cannula/NIPPV  Left lower lobe pneumonia   Chronically elevated troponin  COPD, does not appear acutely exacerbated  Chronic diastolic CHF, does not appear volume overloaded  Hyperlipidemia  Recent right lower lobe postobstructive pneumonia with abscess    Continue to monitor on telemetry for management of the above  Consult pulmonology, appreciate assistance  Continue broad-spectrum vancomycin and cefepime, monitor vancomycin levels  Start Solu-Medrol 60 mg IV every 6 hours  Start duo nebs every 4 hours, Pulmicort  and Brovana twice daily, albuterol as needed, bronchopulmonary hygiene  Start Lasix 40 mg IV every 8 hours, strict I's and O's  Start BiPAP due to hypercapnia and worsening mentation  Continue appropriate home medications  Trend renal function and electrolytes with a.m. BMP  Trend Hgb and WBC with a.m. CBC  Clinical course will dictate further management     Discussed plan with RN, bedside RN    DVT prophylaxis:  Mechanical DVT prophylaxis orders are present.    CODE STATUS:   Limited Support to NOT Include: Intubation  Code Status: No CPR  Medical Interventions (Level of Support Prior to Arrest): Limited      Electronically signed by Shravan Levy MD, 08/03/21, 3:36 PM EDT.

## 2021-08-03 NOTE — PLAN OF CARE
Problem: Fall Injury Risk  Goal: Absence of Fall and Fall-Related Injury  Intervention: Promote Injury-Free Environment  Recent Flowsheet Documentation  Taken 8/3/2021 0800 by Sarah Grider RN  Safety Promotion/Fall Prevention:   toileting scheduled   safety round/check completed   room organization consistent   nonskid shoes/slippers when out of bed   lighting adjusted   fall prevention program maintained   clutter free environment maintained   assistive device/personal items within reach   Goal Outcome Evaluation:  Plan of Care Reviewed With: patient, son           Outcome Summary: Patient very lethargic today, cont pulse ox started, sats have been in the middle 80s to upper 90s through out the day, still confused. Good UOP

## 2021-08-04 NOTE — CONSULTS
"Purpose of the visit was to evaluate for: goals of care/advanced care planning. Spoke with RN, patient and family and discussed palliative care, goals of care, care options, [facility], discharge options and clarify code status.      Assessment:  Palliative care met with the patient and his oxygen had come dislodged from his nares and his saturation was 70%. Patient's respirations were not labored or distressed, he was calm and alert.  I replaced the oxygen and attempted to explain palliative care and my role. Patient is nonverbal and unable to answers my questions.  I collaborated with ROBBY Perkins and informed her of his condition when I entered the room.  She indicated that he has been \"pocketing food\" and eating very little.      I called the son/POMARK Alanis and introduced palliative care and explained my role.  He stated that only a few days ago, the patient was at home walking with a walker to the table to eat and he \"ate like a horse\".  He stated that he had periods of confusion at home but not like he is here.  He stated he understands that he has been declining and his age and physical conditions are not good.  I reviewed his prognosis and nutritional needs. We discussed his current condition, quality of life and comfort. He stated he and his three brothers take turns staying with the patient at his home.       I offered to make a [facility] referral for more information to assist the family in making a decision regarding care.  He agreed.  He wanted to give patient a few more days to improve with current treatment.  Emotional support was provided.        Recommendations/Plan: [facility] referral made for information.    Tasks Completed: Code Status clarification and Emotional Support.    Other Comments: Palliative care will continue to provide support and assistance.   GADIEL HassanN, RN, CHPN  "

## 2021-08-04 NOTE — PROGRESS NOTES
MD order for patient to use home positive airway pressure device.  Unit reviewed and appears to be a device specified in [facility] recall.  Patient provided information on recall and directed to discuss any concerns with sleep provider and DME.  Patient states desire to continue use of home device during hospital stay.

## 2021-08-04 NOTE — PROGRESS NOTES
[facility]   Hospitalist Progress Note  Date: 2021  Patient Name: Alexander Rouse  : 1935  MRN: [id number]  Date of admission: 2021      Subjective   Subjective     Chief Complaint: Shortness of breath    Summary: 85 y.o. male with history of COPD, asthma, hyperlipidemia, diastolic CHF who was recently admitted to our facility from - for a lung abscess and right lower lobe postobstructive pneumonia.  [facility]  He was doing well up until today when he became short of breath.  History is obtained by the patient's son and ED physician as the patient is currently confused.  Staff found the patient to be difficult to arouse, they checked his oxygen saturation he was found to be 75% on 4 L nasal cannula.  He was brought to the emergency department where he was transitioned to 8 L high flow with improvement in his saturations.  Patient completed recent antibiotic therapy on  of cefdinir and  of Zyvox.  He is vaccinated against COVID-19.     In the emergency department, patient required 8 L high flow nasal cannula to maintain saturations. He was tachypneic in the 20s.  Heart rate and blood pressure stable.  Laboratory evaluation revealed elevated troponin at 0.36, however this appears to be chronic for the patient.  There is no leukocytosis.  BMP within normal limits.  Chest x-ray revealed left lower lobe pneumonia.  He was given vancomycin and cefepime in the emergency department and the hospitalist team was contacted to admit for further management.     Interval Followup: No events overnight.  Patient still remains largely confused and somnolent.  Was able to tell nursing his name and date of birth this morning but does not answer any of my questions appropriately.  Still with poor mental status.  Still requiring 5--6 L high flow nasal cannula with adequate saturations.    Review of Systems   A 10 point review of systems was attempted to be obtained  but could not due to the patient's mentation    Objective   Objective     Vitals:   Temp:  [97.3 °F (36.3 °C)-98.6 °F (37 °C)] 97.9 °F (36.6 °C)  Heart Rate:  [74-92] 87  Resp:  [18-21] 18  BP: (103-140)/(52-74) 122/62  Flow (L/min):  [5-6] 6  Physical Exam    Constitutional: Awake, lethargic but awakens to voice, no acute distress   Eyes: Pupils equal, sclerae anicteric, no conjunctival injection   HENT: NCAT, mucous membranes moist   Neck: Supple, no thyromegaly, no lymphadenopathy, trachea midline   Respiratory: Coarse crackles on the right, nasal cannula in place, nonlabored respirations    Cardiovascular: RRR, no murmurs, rubs, or gallops   Gastrointestinal: Positive bowel sounds, soft, nontender, nondistended   Musculoskeletal: No bilateral ankle edema, no clubbing or cyanosis to extremities   Psychiatric: Appropriate affect, cooperative   Neurologic: Lethargic, oriented x 0, strength symmetric in all extremities, not following commands, cranial Nerves grossly intact to confrontation, speech clear   Skin: No rashes     Result Review    Result Review:  I have personally reviewed the results from the time of this admission to 8/4/2021 14:07 EDT and agree with these findings:  [x]  Laboratory  [x]  Microbiology  [x]  Radiology  [x]  EKG/Telemetry   []  Cardiology/Vascular   []  Pathology  []  Old records  []  Other:  Telemetry reviewed showed normal sinus rhythm    Assessment/Plan   Assessment / Plan     Assessment/Plan:  Acute hypoxic and hypercapnic respiratory failure requiring high flow nasal cannula/NIPPV  Recurrent right lower lobe pneumonia   Chronically elevated troponin  COPD, does not appear acutely exacerbated  Chronic diastolic CHF, does not appear volume overloaded  Hyperlipidemia  Recent right lower lobe postobstructive pneumonia with abscess    Continue to monitor on telemetry for management of the above  Pulmonology following, appreciate assistance  Continue broad-spectrum vancomycin and cefepime,  monitor vancomycin levels  Start Solu-Medrol 60 mg IV every 6 hours  Add percussion therapy, encourage I-S and flutter valve  Continue duo nebs every 4 hours, Pulmicort and Brovana twice daily, albuterol as needed, bronchopulmonary hygiene  Continue Lasix 40 mg IV every 8 hours, strict I's and O's  BiPAP at night and with naps  Continue appropriate home medications  Attempt obtain MRI brain for further evaluation of mentation  Patient has overall poor clinical status and poor prognosis.  Will consult palliative care for goals of care discussion and consideration of hospice referral  Trend renal function and electrolytes with a.m. BMP  Trend Hgb and WBC with a.m. CBC  Clinical course will dictate further management     Discussed plan with RN, bedside RN, pulmonology    DVT prophylaxis:  Mechanical DVT prophylaxis orders are present.    CODE STATUS:   Limited Support to NOT Include: Intubation  Code Status: No CPR  Medical Interventions (Level of Support Prior to Arrest): Limited      Electronically signed by Shravan Levy MD, 08/04/21, 2:07 PM EDT.

## 2021-08-04 NOTE — PROGRESS NOTES
Pulmonary / Critical Care Consult Note      Patient Name: Alexander Rouse  : 1935  MRN: [id number]  Primary Care Physician:  Sharif Singer MD  Referring Physician: No ref. provider found  Date of admission: 2021    Subjective   Subjective     Reason for Consult/ Chief Complaint: Hypoxic respiratory failure    HPI:  Follow-up hypoxia  Currently on 2 L of nasal cannula  Very poor mental status  Very lethargic  Is arousable but not interactive  Review of Systems  Unable to obtain due to altered mental status    Personal History     Past Medical History:   Diagnosis Date   • Apnea, sleep    • Asthma    • COPD (chronic obstructive pulmonary disease) (CMS/HCC)    • Hyperlipidemia    • Hypertension    • TIA (transient ischemic attack)        Past Surgical History:   Procedure Laterality Date   • BRONCHOSCOPY N/A 2021    Procedure: BRONCHOSCOPY WITH ENDOBRONCHIAL ULTRASOUND/BRONCHIAL ALVEOLAR LAVAGE/BIOPSIES/BRUSHINGS/WASHINGS;  Surgeon: Artur Randolph MD;  Location: [facility];  Service: Pulmonary;  Laterality: N/A;  LUNG ABCESS   • CARDIAC SURGERY     • CORONARY ANGIOPLASTY WITH STENT PLACEMENT     • LUNG CANCER SURGERY     • SHOULDER SURGERY Bilateral        Family History: Family history is unknown by patient. Otherwise pertinent FHx was reviewed and not pertinent to current issue.    Social History:  reports that he quit smoking about 3 months ago. He does not have any smokeless tobacco history on file. He reports that he does not drink alcohol and does not use drugs.    Home Medications:  Selenium, albuterol, apixaban, aspirin, atorvastatin, carvedilol, cetirizine, cholecalciferol, furosemide, guaiFENesin, ipratropium-albuterol, mometasone-formoterol, montelukast, nitroglycerin, omeprazole, pantoprazole, and saccharomyces boulardii    Allergies:  No Known Allergies    Objective    Objective     Vitals:   Temp:  [97.3 °F (36.3 °C)-98.6 °F (37 °C)] 97.9 °F (36.6 °C)  Heart Rate:  [74-92]  87  Resp:  [18-21] 18  BP: (103-140)/(52-74) 122/62  Flow (L/min):  [5-6] 6    Physical Exam:  Vital Signs Reviewed   General: WDWN male, Lehtargic, opens eyes to stimulation and answers simple questions but quickly goes back to sleep, NAD on 7L HFNC    HEENT:  PERRL, EOMI.  OP, nares clear, no sinus tenderness  Neck:  Supple, no JVD, no thyromegaly  Lymph: no axillary, cervical, supraclavicular lymphadenopathy noted bilaterally  Chest:  poor aeration, coarse crackles and rhonchi on right, tympanic to percussion bilaterally, no work of breathing noted  CV: RRR, no MGR, pulses 2+, equal  Abd:  Soft, NT, ND, + BS, no HSM  EXT:  no clubbing, no cyanosis, no edema, no joint tenderness  Neuro:  Lethargic, moves all 4 extremities, CN grossly intact, no focal deficits  Skin: No rashes or lesions noted    Result Review    Result Review:  I have personally reviewed the results from the time of this admission to 8/4/2021 12:19 EDT and agree with these findings:  [x]  Laboratory  [x]  Microbiology  [x]  Radiology  [x]  EKG/Telemetry   []  Cardiology/Vascular   []  Pathology  [x]  Old records  []  Other:  Most notable findings include: WBC 7.87, Procal 0.1, Hgb 8.8, Cr 0.84, K+ 3.4, Mg+ 1.7, PO4 3.3, troponin 0.03.  CXR with new infiltrates to right lung base since previous CXR on 7/14/21.  CT chest with worsening RLL infiltrates, 9mm non-calcified LLL nodule unchanged from study on 5/2019.  Reviewed previous CXR and CT scans.    Assessment/Plan   Assessment / Plan     Active Hospital Problems:  Active Hospital Problems    Diagnosis    • Pneumonia of left lower lobe due to infectious organism      Impression:  Acute hypoxic respiratory failure requiring HFNC  Recurrent RLL pneumonia  Recent Proteus and MRSA pneumonia  Question aspiration related  Hx of NSCL lung cancer with RUL resection in 2012  Diastolic heart failure   COPD with acute exacerbation    Plan:  Needs aggressive airway clearance however this is being limited due  to his overall poor level of consciousness.  Continue triple nebulizers with Brovana, Pulmicort, and Duonebs.  Continue t bronchopulmonary hygiene.  Add percussion therapy.    Encourage IS and flutter valve when mentation better.  Continue Solu-medrol.  Avoid sedating medications  Continue diuresis with Lasix.  Speech therapy consult for possible aspiration.  Given his overall status, he has very poor prognosis.  Be appropriate for palliative care hospice discussion    Labs, microbiology, radiology, medications, and provider notes personally reviewed.  Discussed with primary service and bedside RN.    Electronically signed by Anmol Walsh DO, 08/04/21, 12:19 PM EDT.  by Anmol Walsh DO, 8/4/2021, 12:19 EDT.

## 2021-08-04 NOTE — CONSULTS
[facility]   Consult Note      Patient Name: Alexander Rouse  : 1935  MRN: [id number]  Primary Care Physician:  Sharif Singer MD  Date of admission: 2021    Subjective   Subjective     This 75 years old gentleman was seen upon request of Dr. Shravan Schmitt for evaluation    Chief Complaint:   Altered mental state    HPI:  The information was obtained mainly from the chart and some from the son who does not know much about his condition.  He could not talk.  The son mentioned that he just underwent a talk today.    Apparently, he still having difficulty in breathing on 2021.  He was admitted there from the rehabilitation facility because of shortness of breath.  He was found to have acute hypoxic respiratory failure requiring high flow nasal cannula oxygen replacement.    Review of Systems  There is no report of any headache, dizziness, nausea or vomiting.    Personal History     Past Medical History:   Diagnosis Date   • Apnea, sleep    • Asthma    • COPD (chronic obstructive pulmonary disease) (CMS/HCC)    • Hyperlipidemia    • Hypertension    • TIA (transient ischemic attack)        Past Surgical History:   Procedure Laterality Date   • BRONCHOSCOPY N/A 2021    Procedure: BRONCHOSCOPY WITH ENDOBRONCHIAL ULTRASOUND/BRONCHIAL ALVEOLAR LAVAGE/BIOPSIES/BRUSHINGS/WASHINGS;  Surgeon: Artur Randolph MD;  Location: [facility];  Service: Pulmonary;  Laterality: N/A;  LUNG ABCESS   • CARDIAC SURGERY     • CORONARY ANGIOPLASTY WITH STENT PLACEMENT     • LUNG CANCER SURGERY     • SHOULDER SURGERY Bilateral        Family History: Family history is unknown by patient. Otherwise pertinent FHx was reviewed and not pertinent to current issue.    Social History:  reports that he quit smoking about 3 months ago. He does not have any smokeless tobacco history on file. He reports that he does not drink alcohol and does not use drugs.    Psychosocial History: No known psychiatric  difficulties    Home Medications:  Selenium, albuterol, apixaban, aspirin, atorvastatin, carvedilol, cetirizine, cholecalciferol, furosemide, guaiFENesin, ipratropium-albuterol, mometasone-formoterol, montelukast, nitroglycerin, omeprazole, pantoprazole, and saccharomyces boulardii      Allergies:  No Known Allergies    Objective   Objective     Vitals:   Temp:  [97.3 °F (36.3 °C)-98.6 °F (37 °C)] 98.1 °F (36.7 °C)  Heart Rate:  [80-92] 82  Resp:  [18-21] 18  BP: (103-129)/(52-74) 110/66  Flow (L/min):  [5-6] 5  Physical Exam   He was awake but sluggish.  He did not talk.    His heart was regular.  Heart rate was 80/min.  There were no murmurs his lungs were clear.  The right    Neurological examination.    I could not see his pupils because he kept closing his eyes.    No facial asymmetry.  No facial weakness.  I am not sure whether he can hear normal conversational speech.    Could not make an accurate assessment of the strength in both upper and lower extremities.  He would not cooperate for the examination.    Deep tendon reflexes were absent on both sides.  He started able to do the finger-to-nose test on both sides.    Result Review    Result Review:  I have personally reviewed the results from the time of this admission to 8/4/2021 19:56 EDT and agree with these findings:  [x]  Laboratory  []  Microbiology  [x]  Radiology  []  EKG/Telemetry   []  Cardiology/Vascular   []  Pathology  []  Old records  []  Other:  Most notable findings include:   I reviewed the computer images of the CAT scan of her of his brain that was done on 8/2/2021.  The study showed no acute changes.  There is moderate subcortical and periventricular white matter changes consistent with old microvascular ischemia.  There is moderate sinus disease affecting both ethmoid and both frontal sinuses as well as the right maxillary sinus.  There is also some fluid noted in the mastoid air cells on both sides more on the right.    The report of  the CAT scan of the chest which showed new or increased atelectasis and or infiltrates in the right lung base and remaining right lower lobe suggestive of pneumonia.    Assessment/Plan   Assessment / Plan     Impression:    Altered Mental State  Metabolic encephalopathy  Acute Hypoxic Respiratory Failure  Cerebral Infarction  Nonconvulsive Seizure Disorder    Active Hospital Problems:  Active Hospital Problems    Diagnosis    • Pneumonia of left lower lobe due to infectious organism        Plan:   We will obtain an MRI of the brain and EEG if not already done so.  Depending on the results of this, will advise accordingly.      DVT prophylaxis:  Mechanical DVT prophylaxis orders are present.    CODE STATUS:    Limited Support to NOT Include: Intubation  Code Status: No CPR  Medical Interventions (Level of Support Prior to Arrest): Limited        Electronically signed by Jake De La Cruz Jr., MD, 08/04/21, 7:56 PM EDT.

## 2021-08-04 NOTE — SIGNIFICANT NOTE
"Notified provider that patients monitor showed controlled a-fiib, hr between 70- high 80's. I have reviewed his chart and do not see a history of A-fib. Stated \"Thank you. Let me know if rate increases\"  "

## 2021-08-04 NOTE — SIGNIFICANT NOTE
"Notified provider Respiratory was needing orders changed for bipap, from our facility to home bipap. Stated \"I will change it here in a bit\"  "

## 2021-08-04 NOTE — PLAN OF CARE
Goal Outcome Evaluation:  Plan of Care Reviewed With: patient        Progress: no change  Outcome Summary: Pt more arousable, [place]. Continuous pulse ox monitored, sats maintained above 86%, did not tolerate home CPAP. Will continue to monitor.

## 2021-08-04 NOTE — PLAN OF CARE
Problem: Palliative Care  Goal: Enhanced Quality of Life  Outcome: Ongoing, Progressing  Intervention: Maximize Comfort  Flowsheets (Taken 8/4/2021 1512)  Pain Management Interventions:   see MAR   quiet environment facilitated  Nutrition Interventions: diet adjusted  Intervention: Optimize Function  Flowsheets (Taken 8/4/2021 1512)  Sensory Stimulation Regulation:   care clustered   lighting decreased   quiet environment promoted   tactile stimulation minimized   visual stimulation minimized   auditory stimulation minimized  Fatigue Management: (dependent for all adls)   activity schedule adjusted   frequent rest breaks encouraged   fatigue-related activity identified  Sleep/Rest Enhancement:   awakenings minimized   consistent schedule promoted   regular sleep/rest pattern promoted   relaxation techniques promoted   family presence promoted   natural light exposure provided   noise level reduced  Intervention: Promote Advance Care Planning  Flowsheets (Taken 8/4/2021 1512)  Life Transition/Adjustment:   advance care planning facilitated   decision-making facilitated   palliative care discussed   palliative care initiated  Intervention: Optimize Psychosocial Wellbeing  Flowsheets (Taken 8/4/2021 1512)  Supportive Measures:   active listening utilized   relaxation techniques promoted   decision-making supported   goal setting facilitated   positive reinforcement provided   problem-solving facilitated   verbalization of feelings encouraged  Grieving Process Facilitation: family/significant other support facilitated  Spiritual Activities Assistance: spiritual support provided  Family/Support System Care:   caregiver stress acknowledged   involvement promoted   presence promoted   support provided

## 2021-08-05 NOTE — PLAN OF CARE
Goal Outcome Evaluation:  Plan of Care Reviewed With: patient, son  Diet will be downgraded to spoon fed honey thick liquids and puréed solids.  Patient exhibiting signs symptoms of aspiration with nectar thickened liquids at bedside this AM.  Little to no manipulation this date with trials of soft solids.  Speech therapy to continue to follow.

## 2021-08-05 NOTE — PLAN OF CARE
Goal Outcome Evaluation:  Plan of Care Reviewed With: patient        Progress: no change  Outcome Summary: Patient presents with limitations in self-care, functional transfers, balance, and endurance. He would benefit from continued skilled occupational therapy services to maximize ADL performance and return home safely and independently.

## 2021-08-05 NOTE — PLAN OF CARE
Palliative care visited with patient, he was getting prepared for an EEG.  Patient is awake and will make eye contact but will not answer any questions for me.  There are no nonverbal signs of pain or discomfort noted during the visit.      I collaborated with RN Eduarda and she said he has not talked to her either but talked to the PCA earlier.  Patient continues to eat very little.  There is no family at bedside during the visit.  Palliative care will call the son to provide support and assistance.  [facility] visit set up for tomorrow at 9am.    Problem: Palliative Care  Goal: Enhanced Quality of Life  Outcome: Ongoing, Progressing  Intervention: Maximize Comfort  Recent Flowsheet Documentation  Taken 8/5/2021 1253 by Yudy Rock RN  Pain Management Interventions:   see MAR   quiet environment facilitated   unnecessary movement minimized  Intervention: Optimize Function  Recent Flowsheet Documentation  Taken 8/5/2021 1253 by Yudy Rock RN  Sensory Stimulation Regulation:   auditory stimulation minimized   visual stimulation minimized   quiet environment promoted   tactile stimulation minimized   care clustered  Fatigue Management: (dependant for all adls) activity assistance provided  Sleep/Rest Enhancement:   consistent schedule promoted   regular sleep/rest pattern promoted   relaxation techniques promoted   family presence promoted   noise level reduced  Intervention: Promote Advance Care Planning  Recent Flowsheet Documentation  Taken 8/5/2021 1253 by Yudy Rock RN  Life Transition/Adjustment: palliative care discussed  Intervention: Optimize Psychosocial Wellbeing  Recent Flowsheet Documentation  Taken 8/5/2021 1253 by Yudy Rock RN  Supportive Measures:   active listening utilized   decision-making supported   positive reinforcement provided  Grieving Process Facilitation: family/significant other support facilitated  Family/Support System Care:   caregiver stress acknowledged   involvement  promoted   presence promoted   support provided  Yudy Rock, GADIELN, RN, CHPN

## 2021-08-05 NOTE — PLAN OF CARE
Goal Outcome Evaluation:  Plan of Care Reviewed With: patient        Progress: improving  Outcome Summary: At beginning of shift pt was sleeping, but arousable to his name and would answer questions appropriately. At this time pt is alert, wearing his CPAP and tolerating it well. Will continue to monitor. Received verbal consent from his POA, son Zeke, for the scheduled MRI. Chest x-ray done to r/o implants from AAA surgery.

## 2021-08-05 NOTE — THERAPY TREATMENT NOTE
Acute Care - Speech Language Pathology   Swallow Treatment Note  [place]     Patient Name: Alexander Rouse  : 1935  MRN: [id number]  Today's Date: 2021               Admit Date: 2021    Visit Dx:     ICD-10-CM ICD-9-CM   1. Pneumonia of left lower lobe due to infectious organism  J18.9 486   2. Acute on chronic respiratory failure with hypoxia (CMS/[facility])  J96.21 518.84     799.02   3. Oropharyngeal dysphagia  R13.12 787.22     Patient Active Problem List   Diagnosis   • CAFL (chronic airflow limitation) (CMS/[facility])   • Cough   • Cancer of lower lobe of lung (CMS/[facility])   • Disease of larynx   • Rotator cuff tear arthropathy   • Status post complete repair of rotator cuff   • Nodule of right lung   • Troponin level elevated   • Hypothermia   • Altered mental status   • Abnormal chest CT   • Cavitary lesion of lung   • Pneumonia of left lower lobe due to infectious organism     Past Medical History:   Diagnosis Date   • Apnea, sleep    • Asthma    • COPD (chronic obstructive pulmonary disease) (CMS/[facility])    • Hyperlipidemia    • Hypertension    • TIA (transient ischemic attack)      Past Surgical History:   Procedure Laterality Date   • ABDOMINAL AORTIC ANEURYSM REPAIR      Son is unsure of the date   • BRONCHOSCOPY N/A 2021    Procedure: BRONCHOSCOPY WITH ENDOBRONCHIAL ULTRASOUND/BRONCHIAL ALVEOLAR LAVAGE/BIOPSIES/BRUSHINGS/WASHINGS;  Surgeon: Artur Randolph MD;  Location: [facility] ENDOSCOPY;  Service: Pulmonary;  Laterality: N/A;  LUNG ABCESS   • CARDIAC SURGERY     • CORONARY ANGIOPLASTY WITH STENT PLACEMENT     • HIP SURGERY     • LUNG CANCER SURGERY     • SHOULDER SURGERY Bilateral        SLP Recommendation and Plan                                                             Plan of Care Reviewed With: patient, son         SWALLOW EVALUATION (last 72 hours)      SLP Adult Swallow Evaluation     Row Name 21 1221                   Rehab Evaluation    Document Type  evaluation  -SN         Subjective Information  no complaints  -SN        Patient Observations  alert;cooperative  -SN           Clinical Impression    SLP Swallowing Diagnosis  moderate  -SN        Functional Impact  risk of aspiration/pneumonia  -SN        Rehab Potential/Prognosis, Swallowing  adequate, monitor progress closely  -SN        Swallow Criteria for Skilled Therapeutic Interventions Met  demonstrates skilled criteria  -SN           Recommendations    Therapy Frequency (Swallow)  daily;5 days per week  -SN        Predicted Duration Therapy Intervention (Days)  until discharge  -SN        SLP Diet Recommendation  mechanical soft with no mixed consistencies;nectar thick liquids  -SN        Recommended Precautions and Strategies  upright posture during/after eating;small bites of food and sips of liquid;assist with feeding  -SN        Oral Care Recommendations  Oral Care BID/PRN  -SN        SLP Rec. for Method of Medication Administration  meds crushed;with pudding or applesauce  -SN        Monitor for Signs of Aspiration  yes  -SN          User Key  (r) = Recorded By, (t) = Taken By, (c) = Cosigned By    Initials Name Effective Dates    SN Ashley Quintana SLP 03/31/21 -         SPEECH PATHOLOGY DYSPHAGIA TREATMENT    Subjective/Behavioral Observations: Patient is awake however decreased responses.        Day/time of Treatment: 8/5/2021, a.m. treatment        Current Diet: Mechanical soft solids, nectar thickened liquids        Current Strategies: One-on-one feed, small bites and sips, controlled drink, meds in applesauce        Treatment received: Treatment received this a.m.        Results of treatment: Patient noted with increased swallow delay during treatment, increased to 4-5-second swallow delay with moderate lingual pumping with both solids and liquids.  Laryngeal wetness observed x2 with wet vocal quality when utilizing nectar thickened liquids by cup.  Trials of honey thick liquids by spoon with lingual pumping with  4-5-second swallow delay.  Laryngeal sounds remained clear and vocal quality clear.  Patient's vocal quality however very soft/weak.  Mechanical soft solids with minimal chewing and manipulation.  Holding consistency in oral cavity requiring removal with patient also occasionally spitting from oral cavity.  Patient however did tolerate approximately 1/2 cup of applesauce and 4 bites of oatmeal without difficulty.  Delayed swallows however were observed 4-5 seconds.        Progress toward goals: Patient exhibiting signs symptoms of aspiration during treatment with trials of nectar thickened liquids.        Barriers to Achieving goals: Medical status/mental/age-related        Plan of care:/changes in plan: Diet to be downgraded to spoon fed honey thickened liquids and puréed solids.  Son at bedside and was educated on results of treatment and plan change.                EDUCATION  The patient has been educated in the following areas:   Dysphagia (Swallowing Impairment).              Time Calculation:   Time Calculation- SLP     Row Name 08/05/21 0920             Time Calculation- SLP    SLP Start Time  0800  -SN      SLP Received On  08/05/21  -SN         Untimed Charges    75231-PW Treatment Swallow Minutes  45  -SN         Total Minutes    Untimed Charges Total Minutes  45  -SN       Total Minutes  45  -SN        User Key  (r) = Recorded By, (t) = Taken By, (c) = Cosigned By    Initials Name Provider Type    SN Ashley Quintana SLP Speech and Language Pathologist          Therapy Charges for Today     Code Description Service Date Service Provider Modifiers Qty    [id number]  ST TREATMENT SWALLOW 3 8/5/2021 Ashley Quintana SLP GN 1               MAGALIS Kebede  8/5/2021

## 2021-08-05 NOTE — PROGRESS NOTES
[facility]   Hospitalist Progress Note  Date: 2021  Patient Name: Alexander Rouse  : 1935  MRN: [id number]  Date of admission: 2021      Subjective   Subjective     Chief Complaint: Shortness of breath    Summary: 85 y.o. male with history of COPD, asthma, hyperlipidemia, diastolic CHF who was recently admitted to our facility from - for a lung abscess and right lower lobe postobstructive pneumonia.  [facility]  He was doing well up until today when he became short of breath.  History is obtained by the patient's son and ED physician as the patient is currently confused.  Staff found the patient to be difficult to arouse, they checked his oxygen saturation he was found to be 75% on 4 L nasal cannula.  He was brought to the emergency department where he was transitioned to 8 L high flow with improvement in his saturations.  Patient completed recent antibiotic therapy on  of cefdinir and  of Zyvox.  He is vaccinated against COVID-19.     In the emergency department, patient required 8 L high flow nasal cannula to maintain saturations. He was tachypneic in the 20s.  Heart rate and blood pressure stable.  Laboratory evaluation revealed elevated troponin at 0.36, however this appears to be chronic for the patient.  There is no leukocytosis.  BMP within normal limits.  Chest x-ray revealed left lower lobe pneumonia.  He was given vancomycin and cefepime in the emergency department and the hospitalist team was contacted to admit for further management.     Interval Followup: No events overnight.  Patient still does not answer any of my questions today.  He apparently will speak to certain nurses occasionally but not all the time.  Has only been eating about 25% of his meals.  Remains on 5 L nasal cannula.    Review of Systems   A 10 point review of systems was attempted to be obtained but could not due to the patient's mentation    Objective   Objective      Vitals:   Temp:  [97.3 °F (36.3 °C)-98.2 °F (36.8 °C)] 97.7 °F (36.5 °C)  Heart Rate:  [] 88  Resp:  [18] 18  BP: (110-119)/(53-70) 119/70  Flow (L/min):  [5] 5  Physical Exam    Constitutional: Awake, alert, no acute distress   Eyes: Pupils equal, sclerae anicteric, no conjunctival injection   HENT: NCAT, mucous membranes moist   Neck: Supple, no thyromegaly, no lymphadenopathy, trachea midline   Respiratory: Coarse crackles on the right, nasal cannula in place, nonlabored respirations    Cardiovascular: RRR, no murmurs, rubs, or gallops   Gastrointestinal: Positive bowel sounds, soft, nontender, nondistended   Musculoskeletal: No bilateral ankle edema, no clubbing or cyanosis to extremities   Psychiatric: Appropriate affect, cooperative   Neurologic: Lethargic, oriented x 0, strength symmetric in all extremities, not following commands, cranial Nerves grossly intact to confrontation, speech clear   Skin: No rashes     Result Review    Result Review:  I have personally reviewed the results from the time of this admission to 8/5/2021 14:14 EDT and agree with these findings:  [x]  Laboratory  [x]  Microbiology  [x]  Radiology  [x]  EKG/Telemetry   []  Cardiology/Vascular   []  Pathology  []  Old records  []  Other:  Telemetry reviewed showed normal sinus rhythm    Assessment/Plan   Assessment / Plan     Assessment/Plan:  Acute hypoxic and hypercapnic respiratory failure requiring high flow nasal cannula/NIPPV  Recurrent right lower lobe pneumonia   Chronically elevated troponin  JOSE RAUL  COPD, does not appear acutely exacerbated  Acute on chronic combined systolic and diastolic congestive heart failure with acute exacerbation  Hyperlipidemia  Recent right lower lobe postobstructive pneumonia with abscess    Continue to monitor on telemetry for management of the above  Pulmonology following, appreciate assistance  Continue broad-spectrum vancomycin and cefepime, monitor vancomycin levels  Continue Solu-Medrol 60  mg IV every 6 hours  Continue percussion therapy, encourage I-S and flutter valve  Continue duo nebs every 4 hours, Pulmicort and Brovana twice daily, albuterol as needed, bronchopulmonary hygiene  Decrease Lasix to 40 mg twice daily, strict I's and O's  Echo reviewed with significant systolic dysfunction, EF 30%  BiPAP at night and with naps  Continue appropriate home medications  Neurology consulted, recommend EEG and MRI.  Both of these are pending.    Patient has overall poor clinical status and poor prognosis.  Only eating 25% of his meals and would likely need a cortrack feeding tube in the next 48 hours if this were consistent with the patient and family goals of care.  Palliative care following, hospice has been consulted.    Trend renal function and electrolytes with a.m. BMP  Trend Hgb and WBC with a.m. CBC  Clinical course will dictate further management     Discussed plan with RN, bedside RN, pulmonology    DVT prophylaxis:  Medical and mechanical DVT prophylaxis orders are present.    CODE STATUS:   Limited Support to NOT Include: Intubation  Code Status: No CPR  Medical Interventions (Level of Support Prior to Arrest): Limited      Electronically signed by Shravan Levy MD, 08/05/21, 2:15 PM EDT.

## 2021-08-05 NOTE — THERAPY EVALUATION
Patient Name: Alexander Rouse  : 1935    MRN: [id number]                              Today's Date: 2021       Admit Date: 2021    Visit Dx:     ICD-10-CM ICD-9-CM   1. Pneumonia of left lower lobe due to infectious organism  J18.9 486   2. Acute on chronic respiratory failure with hypoxia (CMS/[facility])  J96.21 518.84     799.02   3. Oropharyngeal dysphagia  R13.12 787.22   4. Decreased activities of daily living (ADL)  Z78.9 V49.89     Patient Active Problem List   Diagnosis   • CAFL (chronic airflow limitation) (CMS/[facility])   • Cough   • Cancer of lower lobe of lung (CMS/[facility])   • Disease of larynx   • Rotator cuff tear arthropathy   • Status post complete repair of rotator cuff   • Nodule of right lung   • Troponin level elevated   • Hypothermia   • Altered mental status   • Abnormal chest CT   • Cavitary lesion of lung   • Pneumonia of left lower lobe due to infectious organism     Past Medical History:   Diagnosis Date   • Apnea, sleep    • Asthma    • COPD (chronic obstructive pulmonary disease) (CMS/[facility])    • Hyperlipidemia    • Hypertension    • TIA (transient ischemic attack)      Past Surgical History:   Procedure Laterality Date   • ABDOMINAL AORTIC ANEURYSM REPAIR      Son is unsure of the date   • BRONCHOSCOPY N/A 2021    Procedure: BRONCHOSCOPY WITH ENDOBRONCHIAL ULTRASOUND/BRONCHIAL ALVEOLAR LAVAGE/BIOPSIES/BRUSHINGS/WASHINGS;  Surgeon: Artur Randolph MD;  Location: [facility];  Service: Pulmonary;  Laterality: N/A;  LUNG ABCESS   • CARDIAC SURGERY     • CORONARY ANGIOPLASTY WITH STENT PLACEMENT     • HIP SURGERY     • LUNG CANCER SURGERY     • SHOULDER SURGERY Bilateral      General Information     Row Name 21 1318          OT Time and Intention    Document Type  evaluation  -LF     Mode of Treatment  individual therapy;occupational therapy  -LF     Row Name 21 1318          General Information    Patient Profile Reviewed  yes  -LF     Prior Level of Function  --  "Patient does not follow commands, mostly non-verbal, and is unable to report subjective information. Per EMR his three sons take turns staying with him and assisting him as needed and he used a rolling walker for ambulation.  -     Existing Precautions/Restrictions  fall  -     Barriers to Rehab  cognitive status  -     Row Name 08/05/21 1318          Occupational Profile    Reason for Services/Referral (Occupational Profile)  Occupational therapy consulted due to recent decline in ADLs/functional transfers. No previous occupational therapy services for current condition.  -     Row Name 08/05/21 1318          Living Environment    Lives With  child(john), adult Per EMR his 3 sons take turns staying with him.  -     Row Name 08/05/21 1318          Cognition    Orientation Status (Cognition)  oriented to;person Patient responds to \"\" and attempted to state his first name. He was otherwise non-verbal throughout evaluation and unable to follow commands.  -     Row Name 08/05/21 1318          Safety Issues, Functional Mobility    Safety Issues Affecting Function (Mobility)  ability to follow commands;awareness of need for assistance;insight into deficits/self-awareness;judgment;problem-solving;sequencing abilities  -     Impairments Affecting Function (Mobility)  balance;cognition;endurance/activity tolerance  -     Cognitive Impairments, Mobility Safety/Performance  attention;awareness, need for assistance;judgment;insight into deficits/self-awareness;problem-solving/reasoning  -     Comment, Safety Issues/Impairments (Mobility)  Therapeutic exercises to address endurance.  -       User Key  (r) = Recorded By, (t) = Taken By, (c) = Cosigned By    Initials Name Provider Type     Wendy Byrd OT Occupational Therapist          Mobility/ADL's     Row Name 08/05/21 1322          Bed Mobility    Bed Mobility  bed mobility (all) activities  -     All Activities, [place] (Bed Mobility) "  maximum assist (25% patient effort);dependent (less than 25% patient effort)  -     Bed Mobility, Safety Issues  cognitive deficits limit understanding  -     Comment (Bed Mobility)  Patient required max assist/dependent of all bed mobility.  -[facility] Name 08/05/21 1322          Transfers    Comment (Transfers)  Further transfers deferred due to inability to follow commands and decreased cognition.  -[facility] Name 08/05/21 1322          Activities of Daily Living    BADL Assessment/Intervention  bathing;upper body dressing;lower body dressing;grooming;feeding;toileting  -[facility] Name 08/05/21 1322          Bathing Assessment/Intervention    [place] (Bathing)  maximum assist (25% patient effort);dependent (less than 25% patient effort)  -[facility] Name 08/05/21 1322          Upper Body Dressing Assessment/Training    [place] (Upper Body Dressing)  maximum assist (25% patient effort);dependent (less than 25% patient effort)  -[facility] Name 08/05/21 1322          Lower Body Dressing Assessment/Training    [place] (Lower Body Dressing)  maximum assist (25% patient effort);dependent (less than 25% patient effort)  -[facility] Name 08/05/21 1322          Grooming Assessment/Training    [place] (Grooming)  maximum assist (25% patient effort);dependent (less than 25% patient effort)  -[facility] Name 08/05/21 1322          Self-Feeding Assessment/Training    [place] (Feeding)  maximum assist (25% patient effort)  -[facility] Name 08/05/21 1322          Toileting Assessment/Training    [place] (Toileting)  dependent (less than 25% patient effort)  -     Comment (Toileting)  Patient is incontinent  -       User Key  (r) = Recorded By, (t) = Taken By, (c) = Cosigned By    Initials Name Provider Type     Wendy Byrd OT Occupational Therapist        Obj/Interventions     [facility] Name 08/05/21 1323          Sensory Assessment (Somatosensory)     Sensory Assessment (Somatosensory)  unable/difficult to assess Patient unable to follow commands due to decreased cognition, bilateral upper extremities appear to be intact to light touch.  -[facility] Name 08/05/21 1323          Vision Assessment/Intervention    Visual Impairment/Limitations  unable/difficult to assess Patient unable to follow commands due to decreased cognition, he made eye contact with therapist when prompted. Difficulty with visual tracking.  -[facility] Name 08/05/21 1323          Range of Motion Comprehensive    Comment, General Range of Motion  Patient unable to follow commands due to decreased cognition, full active bilateral shoulder flexion with max tactile cues/hand over hand guidance.  -[facility] Name 08/05/21 1323          Strength Comprehensive (MMT)    Comment, General Manual Muscle Testing (MMT) Assessment  Unable to formally assess due to patient being unable to follow commands secondary to decreased cognition.  -LF     Row Name 08/05/21 1323          Motor Skills    Motor Skills  coordination;functional endurance  -     Coordination  WFL Functional grasp/release intact bilaterally.  -     Functional Endurance  Poor  -LF     Row Name 08/05/21 1323          Balance    Balance Assessment  sitting dynamic balance  -     Dynamic Sitting Balance  severe impairment;supported;long sitting  -       User Key  (r) = Recorded By, (t) = Taken By, (c) = Cosigned By    Initials Name Provider Type     Wendy Byrd OT Occupational Therapist        Goals/Plan     Row Name 08/05/21 1330          Bed Mobility Goal 1 (OT)    Activity/Assistive Device (Bed Mobility Goal 1, OT)  bed mobility activities, all  -     [place] Level/Cues Needed (Bed Mobility Goal 1, OT)  minimum assist (75% or more patient effort)  -     Time Frame (Bed Mobility Goal 1, OT)  long term goal (LTG);10 days  -LF     Row Name 08/05/21 1330          Transfer Goal 1 (OT)    Activity/Assistive Device  (Transfer Goal 1, OT)  transfers, all  -LF     [place] Level/Cues Needed (Transfer Goal 1, OT)  minimum assist (75% or more patient effort)  -LF     Time Frame (Transfer Goal 1, OT)  long term goal (LTG);10 days  -     Row Name 08/05/21 1330          Bathing Goal 1 (OT)    Activity/Device (Bathing Goal 1, OT)  bathing skills, all  -LF     [place] Level/Cues Needed (Bathing Goal 1, OT)  minimum assist (75% or more patient effort)  -LF     Time Frame (Bathing Goal 1, OT)  long term goal (LTG);10 days  -     Row Name 08/05/21 1330          Dressing Goal 1 (OT)    Activity/Device (Dressing Goal 1, OT)  dressing skills, all  -LF     [place]/Cues Needed (Dressing Goal 1, OT)  minimum assist (75% or more patient effort)  -LF     Time Frame (Dressing Goal 1, OT)  long term goal (LTG);10 days  -     Row Name 08/05/21 1330          Toileting Goal 1 (OT)    Activity/Device (Toileting Goal 1, OT)  toileting skills, all  -LF     [place] Level/Cues Needed (Toileting Goal 1, OT)  minimum assist (75% or more patient effort)  -LF     Time Frame (Toileting Goal 1, OT)  long term goal (LTG);10 days  -     Row Name 08/05/21 1330          Grooming Goal 1 (OT)    Activity/Device (Grooming Goal 1, OT)  grooming skills, all  -LF     [place] (Grooming Goal 1, OT)  minimum assist (75% or more patient effort)  -LF     Time Frame (Grooming Goal 1, OT)  long term goal (LTG);10 days  -     Row Name 08/05/21 1330          Therapy Assessment/Plan (OT)    Planned Therapy Interventions (OT)  activity tolerance training;patient/caregiver education/training;BADL retraining;occupation/activity based interventions;functional balance retraining;transfer/mobility retraining  -       User Key  (r) = Recorded By, (t) = Taken By, (c) = Cosigned By    Initials Name Provider Type    LF Wendy Byrd OT Occupational Therapist        Clinical Impression     Row Name 08/05/21 1329          Pain Assessment    Additional  Documentation  Pain Scale: FACES Pre/Post-Treatment (Group)  -     Row Name 08/05/21 1329          Pain Scale: FACES Pre/Post-Treatment    Pain: FACES Scale, Pretreatment  0-->no hurt  -     Posttreatment Pain Rating  0-->no hurt  -     Row Name 08/05/21 1329          Plan of Care Review    Plan of Care Reviewed With  patient  -     Progress  no change  -     Outcome Summary  Patient presents with limitations in self-care, functional transfers, balance, and endurance. He would benefit from continued skilled occupational therapy services to maximize ADL performance and return home safely and independently.  -     Row Name 08/05/21 1329          Therapy Assessment/Plan (OT)    Patient/Family Therapy Goal Statement (OT)  To maximize independence.  -     Criteria for Skilled Therapeutic Interventions Met (OT)  yes;meets criteria;skilled treatment is necessary  -     Therapy Frequency (OT)  5 times/wk  -     Row Name 08/05/21 1329          Therapy Plan Review/Discharge Plan (OT)    Equipment Needs Upon Discharge (OT)  commode chair;tub bench  -     Anticipated Discharge Disposition (OT)  skilled nursing facility  -     Row Name 08/05/21 1329          Vital Signs    O2 Delivery Pre Treatment  nasal cannula  -     O2 Delivery Intra Treatment  nasal cannula  -     O2 Delivery Post Treatment  nasal cannula  -       User Key  (r) = Recorded By, (t) = Taken By, (c) = Cosigned By    Initials Name Provider Type     Wendy Byrd, OT Occupational Therapist        Outcome Measures     Row Name 08/05/21 1331          How much help from another is currently needed...    Putting on and taking off regular lower body clothing?  1  -LF     Bathing (including washing, rinsing, and drying)  1  -LF     Toileting (which includes using toilet bed pan or urinal)  1  -LF     Putting on and taking off regular upper body clothing  1  -LF     Taking care of personal grooming (such as brushing teeth)  1  -LF      Eating meals  2  -LF     AM-PAC 6 Clicks Score (OT)  7  -LF     Row Name 08/05/21 133          Functional Assessment    Outcome Measure Options  AM-PAC 6 Clicks Daily Activity (OT);Optimal Instrument  -LF     Row Name 08/05/21 1331          Optimal Instrument    Optimal Instrument  Optimal - 3  -LF     Bending/Stooping  5  -LF     Standing  5  -LF     Reaching  1  -LF     From the list, choose the 3 activities you would most like to be able to do without any difficulty  Bending/stooping;Standing;Reaching  -LF     Total Score Optimal - 3  11  -LF       User Key  (r) = Recorded By, (t) = Taken By, (c) = Cosigned By    Initials Name Provider Type     Wendy Byrd OT Occupational Therapist          Occupational Therapy Education                 Title: PT OT SLP Therapies (In Progress)     Topic: Occupational Therapy (In Progress)     Point: ADL training (In Progress)     Description:   Instruct learner(s) on proper safety adaptation and remediation techniques during self care or transfers.   Instruct in proper use of assistive devices.              Learning Progress Summary           Patient Acceptance, E,TB, NR by  at 8/5/2021 1333    Acceptance, E,TB, DU,NR by MM at 8/2/2021 2056   Family Acceptance, E,TB, DU,NR by MM at 8/2/2021 2056                   Point: Home exercise program (Deleted)     Description:   Instruct learner(s) on appropriate technique for monitoring, assisting and/or progressing therapeutic exercises/activities.              Learning Progress Summary           Patient Acceptance, E,TB, DU,NR by MM at 8/2/2021 2056   Family Acceptance, E,TB, DU,NR by MM at 8/2/2021 2056                   Point: Precautions (In Progress)     Description:   Instruct learner(s) on prescribed precautions during self-care and functional transfers.              Learning Progress Summary           Patient Acceptance, E,TB, NR by  at 8/5/2021 1333    Acceptance, E,TB, DU,NR by MM at 8/2/2021 2056   Family  Acceptance, E,TB, DU,NR by  at 8/2/2021 2056                   Point: Body mechanics (In Progress)     Description:   Instruct learner(s) on proper positioning and spine alignment during self-care, functional mobility activities and/or exercises.              Learning Progress Summary           Patient Acceptance, E,TB, NR by  at 8/5/2021 1333    Acceptance, E,TB, DU,NR by  at 8/2/2021 2056   Family Acceptance, E,TB, DU,NR by  at 8/2/2021 2056                               User Key     Initials Effective Dates Name Provider Type Discipline     06/16/21 -  Newton Cardoso RN Registered Nurse Nurse     06/16/21 -  Wendy Byrd OT Occupational Therapist OT              OT Recommendation and Plan  Planned Therapy Interventions (OT): activity tolerance training, patient/caregiver education/training, BADL retraining, occupation/activity based interventions, functional balance retraining, transfer/mobility retraining  Therapy Frequency (OT): 5 times/wk  Plan of Care Review  Plan of Care Reviewed With: patient  Progress: no change  Outcome Summary: Patient presents with limitations in self-care, functional transfers, balance, and endurance. He would benefit from continued skilled occupational therapy services to maximize ADL performance and return home safely and independently.     Time Calculation:   Time Calculation- OT     Row Name 08/05/21 1331             Time Calculation- OT    OT Received On  08/05/21  -LF      OT Goal Re-Cert Due Date  08/14/21  -LF         Untimed Charges    OT Eval/Re-eval Minutes  40  -LF         Total Minutes    Untimed Charges Total Minutes  40  -LF       Total Minutes  40  -LF        User Key  (r) = Recorded By, (t) = Taken By, (c) = Cosigned By    Initials Name Provider Type     Wendy Byrd OT Occupational Therapist        Therapy Charges for Today     Code Description Service Date Service Provider Modifiers Qty    97033203834 HC OT EVAL LOW COMPLEXITY 3 8/5/2021  Wendy Byrd, OT GO 1               Wendy Byrd, ATTILA  8/5/2021

## 2021-08-05 NOTE — THERAPY TREATMENT NOTE
Acute Care - Speech Language Pathology   Swallow Treatment Note  [place]     Patient Name: Alexander Rouse  : 1935  MRN: [id number]  Today's Date: 2021               Admit Date: 2021    Visit Dx:     ICD-10-CM ICD-9-CM   1. Pneumonia of left lower lobe due to infectious organism  J18.9 486   2. Acute on chronic respiratory failure with hypoxia (CMS/[facility])  J96.21 518.84     799.02   3. Oropharyngeal dysphagia  R13.12 787.22   4. Decreased activities of daily living (ADL)  Z78.9 V49.89     Patient Active Problem List   Diagnosis   • CAFL (chronic airflow limitation) (CMS/[facility])   • Cough   • Cancer of lower lobe of lung (CMS/[facility])   • Disease of larynx   • Rotator cuff tear arthropathy   • Status post complete repair of rotator cuff   • Nodule of right lung   • Troponin level elevated   • Hypothermia   • Altered mental status   • Abnormal chest CT   • Cavitary lesion of lung   • Pneumonia of left lower lobe due to infectious organism     Past Medical History:   Diagnosis Date   • Apnea, sleep    • Asthma    • COPD (chronic obstructive pulmonary disease) (CMS/[facility])    • Hyperlipidemia    • Hypertension    • TIA (transient ischemic attack)      Past Surgical History:   Procedure Laterality Date   • ABDOMINAL AORTIC ANEURYSM REPAIR      Son is unsure of the date   • BRONCHOSCOPY N/A 2021    Procedure: BRONCHOSCOPY WITH ENDOBRONCHIAL ULTRASOUND/BRONCHIAL ALVEOLAR LAVAGE/BIOPSIES/BRUSHINGS/WASHINGS;  Surgeon: Artur Randolph MD;  Location: [facility];  Service: Pulmonary;  Laterality: N/A;  LUNG ABCESS   • CARDIAC SURGERY     • CORONARY ANGIOPLASTY WITH STENT PLACEMENT     • HIP SURGERY     • LUNG CANCER SURGERY     • SHOULDER SURGERY Bilateral        SLP Recommendation and Plan                                                             Plan of Care Reviewed With: patient, son         SWALLOW EVALUATION (last 72 hours)      SLP Adult Swallow Evaluation     Row Name 21 1222                    "Rehab Evaluation    Document Type  evaluation  -SN        Subjective Information  no complaints  -SN        Patient Observations  alert;cooperative  -SN           Clinical Impression    SLP Swallowing Diagnosis  moderate  -SN        Functional Impact  risk of aspiration/pneumonia  -SN        Rehab Potential/Prognosis, Swallowing  adequate, monitor progress closely  -SN        Swallow Criteria for Skilled Therapeutic Interventions Met  demonstrates skilled criteria  -SN           Recommendations    Therapy Frequency (Swallow)  daily;5 days per week  -SN        Predicted Duration Therapy Intervention (Days)  until discharge  -SN        SLP Diet Recommendation  mechanical soft with no mixed consistencies;nectar thick liquids  -SN        Recommended Precautions and Strategies  upright posture during/after eating;small bites of food and sips of liquid;assist with feeding  -SN        Oral Care Recommendations  Oral Care BID/PRN  -SN        SLP Rec. for Method of Medication Administration  meds crushed;with pudding or applesauce  -SN        Monitor for Signs of Aspiration  yes  -SN          User Key  (r) = Recorded By, (t) = Taken By, (c) = Cosigned By    Initials Name Effective Dates    SN Ashley Quintana SLP 03/31/21 -        SPEECH PATHOLOGY DYSPHAGIA TREATMENT    Subjective/Behavioral Observations: Patient had just completed EEG.  Awake lying in bed.  Shakes head yes and no, verbalizes okay\"        Day/time of Treatment: 8/5/2021, p.m. treatment        Current Diet: Honey thickened liquids, puréed solids        Current Strategies: One-on-one feed, spoon fed honey thick liquids, check swallow each bite        Treatment received: Follow-up treatment following diet downgrade this a.m.        Results of treatment: Patient tolerated approximately 60 mL of spoon fed honey thick liquids.  1/2 teaspoon bolus size amount.  Swallow delay 3-4 seconds with trials with laryngeal sounds remaining clear to auscultation.  Patient " tolerated approximately 8 bites of puréed solids by 1/2 teaspoon bolus size presentation.  Lingual pumping with swallow delay 4-5 seconds.  Laryngeal sounds remaining clear to auscultation.        Progress toward goals: Tolerated diet downgrade to spoon fed honey thickened liquids and purée solids.  1/2 teaspoon bolus size presentation.        Barriers to Achieving goals: Mental status, age-related, medical status        Plan of care:/changes in plan: Continue per plan                  EDUCATION  The patient has been educated in the following areas:   Dysphagia (Swallowing Impairment).              Time Calculation:   Time Calculation- SLP     Row Name 08/05/21 1321 08/05/21 0920          Time Calculation- SLP    SLP Start Time  1240  -SN  0800  -SN     SLP Received On  08/05/21  -SN  08/05/21  -SN        Untimed Charges    07795-OD Treatment Swallow Minutes  45  -SN  45  -SN        Total Minutes    Untimed Charges Total Minutes  45  -SN  45  -SN      Total Minutes  45  -SN  45  -SN       User Key  (r) = Recorded By, (t) = Taken By, (c) = Cosigned By    Initials Name Provider Type    SN Ashley Quintana SLP Speech and Language Pathologist          Therapy Charges for Today     Code Description Service Date Service Provider Modifiers Qty    12021432284 HC ST TREATMENT SWALLOW 3 8/5/2021 Ashley Quintana SLP GN 1    [id number] HC ST TREATMENT SWALLOW 3 8/5/2021 Ashley Quintana SLP GN 1               MAGALIS Kebede  8/5/2021

## 2021-08-05 NOTE — PROGRESS NOTES
Pulmonary / Critical Care Consult Note      Patient Name: Alexander Rouse  : 1935  MRN: [id number]  Primary Care Physician:  Sharif Singer MD  Referring Physician: No ref. provider found  Date of admission: 2021    Subjective   Subjective     Reason for Consult/ Chief Complaint: Hypoxic respiratory failure    HPI:  Follow-up hypoxia  Currently on 5L of nasal cannula  Very poor mental status  Very lethargic patient is awake this morning but does not really answer questions  Is arousable but not interactive  Review of Systems  Unable to obtain due to altered mental status    Personal History     Past Medical History:   Diagnosis Date   • Apnea, sleep    • Asthma    • COPD (chronic obstructive pulmonary disease) (CMS/HCC)    • Hyperlipidemia    • Hypertension    • TIA (transient ischemic attack)        Past Surgical History:   Procedure Laterality Date   • ABDOMINAL AORTIC ANEURYSM REPAIR      Son is unsure of the date   • BRONCHOSCOPY N/A 2021    Procedure: BRONCHOSCOPY WITH ENDOBRONCHIAL ULTRASOUND/BRONCHIAL ALVEOLAR LAVAGE/BIOPSIES/BRUSHINGS/WASHINGS;  Surgeon: Artur Randolph MD;  Location: [facility];  Service: Pulmonary;  Laterality: N/A;  LUNG ABCESS   • CARDIAC SURGERY     • CORONARY ANGIOPLASTY WITH STENT PLACEMENT     • HIP SURGERY     • LUNG CANCER SURGERY     • SHOULDER SURGERY Bilateral        Family History: Family history is unknown by patient. Otherwise pertinent FHx was reviewed and not pertinent to current issue.    Social History:  reports that he quit smoking about 3 months ago. He does not have any smokeless tobacco history on file. He reports that he does not drink alcohol and does not use drugs.    Home Medications:  Selenium, albuterol, apixaban, aspirin, atorvastatin, carvedilol, cetirizine, cholecalciferol, furosemide, guaiFENesin, ipratropium-albuterol, mometasone-formoterol, montelukast, nitroglycerin, omeprazole, and saccharomyces boulardii    Allergies:  No  Known Allergies    Objective    Objective     Vitals:   Temp:  [97.3 °F (36.3 °C)-98.2 °F (36.8 °C)] 97.7 °F (36.5 °C)  Heart Rate:  [] 88  Resp:  [18-20] 18  BP: (110-122)/(53-70) 119/70  Flow (L/min):  [5-6] 5    Physical Exam:  Vital Signs Reviewed   General: WDWN male, Lehtargic, opens eyes to stimulation and answers simple questions but quickly goes back to sleep, NAD on 7L HFNC    HEENT:  PERRL, EOMI.  OP, nares clear, no sinus tenderness  Neck:  Supple, no JVD, no thyromegaly  Lymph: no axillary, cervical, supraclavicular lymphadenopathy noted bilaterally  Chest:  poor aeration, coarse crackles and rhonchi on right, tympanic to percussion bilaterally, no work of breathing noted  CV: RRR, no MGR, pulses 2+, equal  Abd:  Soft, NT, ND, + BS, no HSM  EXT:  no clubbing, no cyanosis, no edema, no joint tenderness  Neuro:  Lethargic, moves all 4 extremities, CN grossly intact, no focal deficits  Skin: No rashes or lesions noted    Result Review    Result Review:  I have personally reviewed the results from the time of this admission to 8/5/2021 10:15 EDT and agree with these findings:  [x]  Laboratory  [x]  Microbiology  [x]  Radiology  [x]  EKG/Telemetry   []  Cardiology/Vascular   []  Pathology  [x]  Old records  []  Other:  Most notable findings include: WBC 7.87, Procal 0.1, Hgb 8.8, Cr 0.84, K+ 3.4, Mg+ 1.7, PO4 3.3, troponin 0.03.  CXR with new infiltrates to right lung base since previous CXR on 7/14/21.  CT chest with worsening RLL infiltrates, 9mm non-calcified LLL nodule unchanged from study on 5/2019.  Reviewed previous CXR and CT scans.    Assessment/Plan   Assessment / Plan     Active Hospital Problems:  Active Hospital Problems    Diagnosis    • Pneumonia of left lower lobe due to infectious organism      Impression:  Acute hypoxic respiratory failure requiring HFNC  Recurrent RLL pneumonia  Recent Proteus and MRSA pneumonia  Question aspiration related  Hx of NSCL lung cancer with RUL resection  in 2012  Diastolic heart failure   COPD with acute exacerbation    Plan:  Needs aggressive airway clearance however this is being limited due to his overall poor level of consciousness.  Continue triple nebulizers with Brovana, Pulmicort, and Duonebs.  Continue t bronchopulmonary hygiene.  Continue with percussion therapy.    Encourage IS and flutter valve when mentation better.  Continue IV Solu-Medrol concerned about the possibility that the patient might not be able to swallow prednisone if patient able to swallow prednisone will be okay transitioning to p.o.  Echocardiogram reviewed showing significant reduced ejection fraction  Avoid sedating medications  Continue diuresis with Lasix.  Speech therapy consult for possible aspiration.  Given his overall status, he has very poor prognosis.  Be appropriate for palliative care hospice discussion    Labs, microbiology, radiology, medications, and provider notes personally reviewed.  Discussed with primary service and bedside RN.  N/A  by Anmol Walsh DO, 8/5/2021, 10:15 EDT.    Electronically signed by Anmol Walsh DO, 08/05/21, 10:17 AM EDT.

## 2021-08-06 NOTE — PLAN OF CARE
"Waleska from [facility] stated the RN called the son Zeke and discussed their services.  She stated Zeke was not sure the patient was \"at that point yet\"  He wanted to talk to the rest of his siblings before making a decision.  They are in hopes that he will continue to improve with the current aggressive treatment.  He also informed her that he \"has been at death's door so many times before and bounces back\".      Waleska at [facility] asked that Palliative care inform their office if the family decide to discharge home with their services.  GADIEL HassanN, RN, CHPN                 "

## 2021-08-06 NOTE — PLAN OF CARE
Goal Outcome Evaluation:   Patient changed to NPO, cortrak to be placed per dietitian. Oxygen increased back to 6L this shift due to low sats. Patient talking a little today with staff. Able to answer simple questions. No complaints at this time. Will monitor.

## 2021-08-06 NOTE — PROGRESS NOTES
[facility]   Hospitalist Progress Note  Date: 2021  Patient Name: Alexander Rouse  : 1935  MRN: [id number]  Date of admission: 2021      Subjective   Subjective     Chief Complaint: Shortness of breath    Summary: 85 y.o. male with history of COPD, asthma, hyperlipidemia, systolic and diastolic CHF who was recently admitted to our facility from - for a lung abscess and right lower lobe postobstructive pneumonia.  [facility]  He was doing well up until today when he became short of breath.  History is obtained by the patient's son and ED physician as the patient is currently confused.  Staff found the patient to be difficult to arouse, they checked his oxygen saturation he was found to be 75% on 4 L nasal cannula.  He was brought to the emergency department where he was transitioned to 8 L high flow with improvement in his saturations.  Patient completed recent antibiotic therapy on  of cefdinir and  of Zyvox.  He is vaccinated against COVID-19.     In the emergency department, patient required 8 L high flow nasal cannula to maintain saturations. He was tachypneic in the 20s.  Heart rate and blood pressure stable.  Laboratory evaluation revealed elevated troponin at 0.36, however this appears to be chronic for the patient.  There is no leukocytosis.  BMP within normal limits.  Chest x-ray revealed left lower lobe pneumonia.  He was given vancomycin and cefepime in the emergency department and the hospitalist team was contacted to admit for further management.     Interval Followup: No events overnight.  Apparently the patient was more awake and alert this morning.  He ate almost all of his breakfast.  On my arrival, he does not answer any my questions and still appears lethargic.  Long discussion with family about goals of care.  At this point, they like to try a feeding tube if he does not eat.  Unclear about permanent feeding tube if it came to  that decision.  If he continues to worsen, they are amenable to home with hospice.  Regardless, they do not want him to go back to rehab as they had a [facility] into [facility].  They plan to care for him at home and pay for caregiver.  Oxygen worsened somewhat and he is on 6 L nasal cannula.    Review of Systems   A 10 point review of systems was attempted to be obtained but could not due to the patient's mentation    Objective   Objective     Vitals:   Temp:  [97.3 °F (36.3 °C)-98.2 °F (36.8 °C)] 97.6 °F (36.4 °C)  Heart Rate:  [75-94] 85  Resp:  [18-26] 20  BP: (101-158)/(44-96) 113/44  Flow (L/min):  [3-6] 6  Physical Exam    Constitutional: Lethargic, not answering any questions, no acute distress   Eyes: Pupils equal, sclerae anicteric, no conjunctival injection   HENT: NCAT, mucous membranes moist   Neck: Supple, no thyromegaly, no lymphadenopathy, trachea midline   Respiratory: Crackles in the right lung base, otherwise clear, nasal cannula in place, nonlabored respirations    Cardiovascular: RRR, no murmurs, rubs, or gallops   Gastrointestinal: Positive bowel sounds, soft, nontender, nondistended   Musculoskeletal: No bilateral ankle edema, no clubbing or cyanosis to extremities   Psychiatric: Appropriate affect, cooperative   Neurologic: Lethargic, oriented x 0, strength symmetric in all extremities, not following commands, cranial Nerves grossly intact to confrontation, speech clear   Skin: No rashes     Result Review    Result Review:  I have personally reviewed the results from the time of this admission to 8/6/2021 14:07 EDT and agree with these findings:  [x]  Laboratory  [x]  Microbiology  [x]  Radiology  [x]  EKG/Telemetry   []  Cardiology/Vascular   []  Pathology  []  Old records  []  Other:  Telemetry reviewed showed normal sinus rhythm  MRI personally reviewed without evidence of acute stroke    Assessment/Plan   Assessment / Plan     Assessment/Plan:  Acute hypoxic and hypercapnic respiratory  failure requiring high flow nasal cannula/NIPPV  Recurrent right lower lobe pneumonia   Recurrent aspiration  Hypernatremia  Metabolic alkalosis  JOSE RAUL  Chronically elevated troponin  JOSE RAUL  COPD, does not appear acutely exacerbated  Acute on chronic combined systolic and diastolic congestive heart failure with acute exacerbation  Hyperlipidemia  Recent right lower lobe postobstructive pneumonia with abscess    Continue to monitor on telemetry for management of the above  Pulmonology following, appreciate assistance  DC vancomycin and continue cefepime alone  Transition to prednisone 40 mg daily  Continue percussion therapy, encourage I-S and flutter valve  Continue duo nebs every 4 hours, Pulmicort and Brovana twice daily, albuterol as needed, bronchopulmonary hygiene  DC Lasix  Echo reviewed with significant systolic dysfunction, EF 30%  BiPAP at night and with naps  Patient's mentation appears poor.  He had slight improvement this morning and ate his breakfast, however, his respiratory status has worsened making aspiration more likely.  At this point, I have a long discussion about goals of care.  His family would like to try a short-term feeding tube and if he does not improve agree that he is appropriate for hospice.  Will have core track placed and consult nutrition for tube feeding  [place] per dietitian  Trend renal function and electrolytes with a.m. BMP  Trend Hgb and WBC with a.m. CBC  Clinical course will dictate further management     Discussed plan with RN, bedside RN, pulmonology, palliative care    DVT prophylaxis:  Medical and mechanical DVT prophylaxis orders are present.    CODE STATUS:   Limited Support to NOT Include: Intubation  Code Status: No CPR  Medical Interventions (Level of Support Prior to Arrest): Limited      Electronically signed by Shravan Levy MD, 08/06/21, 2:07 PM EDT.

## 2021-08-06 NOTE — PLAN OF CARE
Pt had MRI this shift, became agitated and gave PRN anxiety medication. Is resting quietly, sats remain above 90% on 3.5 L of O2. Pt has [facility] eval at 9 am. No new changes at this time.

## 2021-08-06 NOTE — PROGRESS NOTES
[facility]     Progress Note             Patient Name: Alexander Rouse  : 1935  MRN: [id number]  Primary Care Physician:  Sharif Singer MD  Date of admission: 2021    Subjective     Subjective       Present illness:  He is awake alert more alert.  Is able to swallow his medications.    Review of Systems  He is still not talking.  There is no headache dizziness nausea or vomiting.  No chest pains or abdominal pains.    Personal History     Past Medical History:   Diagnosis Date   • Apnea, sleep    • Asthma    • COPD (chronic obstructive pulmonary disease) (CMS/HCC)    • Hyperlipidemia    • Hypertension    • TIA (transient ischemic attack)        Past Surgical History:   Procedure Laterality Date   • ABDOMINAL AORTIC ANEURYSM REPAIR      Son is unsure of the date   • BRONCHOSCOPY N/A 2021    Procedure: BRONCHOSCOPY WITH ENDOBRONCHIAL ULTRASOUND/BRONCHIAL ALVEOLAR LAVAGE/BIOPSIES/BRUSHINGS/WASHINGS;  Surgeon: Artur Randolph MD;  Location: [facility];  Service: Pulmonary;  Laterality: N/A;  LUNG ABCESS   • CARDIAC SURGERY     • CORONARY ANGIOPLASTY WITH STENT PLACEMENT     • HIP SURGERY     • LUNG CANCER SURGERY     • SHOULDER SURGERY Bilateral        Family History: Family history is unknown by patient. Otherwise pertinent FHx was reviewed and not pertinent to current issue.    Social History:  reports that he quit smoking about 3 months ago. He does not have any smokeless tobacco history on file. He reports that he does not drink alcohol and does not use drugs.      Home Medications:  Selenium, albuterol, apixaban, aspirin, atorvastatin, carvedilol, cetirizine, cholecalciferol, furosemide, guaiFENesin, ipratropium-albuterol, mometasone-formoterol, montelukast, nitroglycerin, omeprazole, and saccharomyces boulardii      Allergies:  No Known Allergies    Objective   Objective     Vitals:   Temp:  [97.3 °F (36.3 °C)-98.2 °F (36.8 °C)] 98.2 °F (36.8 °C)  Heart Rate:  [68-94] 84  Resp:   [18-26] 20  BP: (101-119)/(47-70) 104/47  Flow (L/min):  [3-5] 4    Physical Exam   Is awake and more alert.  Telephone distress.  He is able to follow some simple commands.  However, still not talking.    His heart was regular.  Heart rate was 80/min.  There were no murmurs.  The lungs were clear.    The carotid pulses were 1+ and equal.  There were no bruit on either side.    Neurological Examination:   He did not talk much.  I am not sure whether he is able to understand and follow verbal commands.    I did not look into the fundus on either side because of the COVID-19 pandemic social distancing.    The pupils were 3 to 4 mm there were round and equal reactive to light directly and consensually.  There were no extraocular muscle weakness.    No facial asymmetry.  No facial weakness.    The deep tendon reflexes were hypoactive to absent on both sides        Result Review    Result Review:  I have personally reviewed the results from the time of this admission to 8/5/2021 21:52 EDT and agree with these findings:  []  Laboratory  []  Microbiology  []  Radiology  []  EKG/Telemetry   []  Cardiology/Vascular   []  Pathology  []  Old records  [x]  Other: EEG most notable findings include:   The EEG is Abnormal because of slow background activity admixed with slower waves compatible with moderate and diffuse encephalopathy. There were no epileptiform discharges noted.       Assessment/Plan   Assessment / Plan     Impression:    Altered Mental State  Metabolic encephalopathy  Acute Hypoxic Respiratory Failure  Cerebral Infarction  Nonconvulsive Seizure Disorder     Active Hospital Problems:  Active Hospital Problems    Diagnosis    • Pneumonia of left lower lobe due to infectious organism      Plan:   We will get an MRI of the brain as well as well as MR angiography of the neck and cerebral vessels.      DVT prophylaxis:  Medical and mechanical DVT prophylaxis orders are present.    CODE STATUS:    Limited Support to NOT  Include: Intubation  Code Status: No CPR  Medical Interventions (Level of Support Prior to Arrest): Limited        Electronically signed by Jake De La Cruz Jr., MD, 08/05/21, 9:52 PM EDT.

## 2021-08-06 NOTE — THERAPY EVALUATION
Acute Care - Physical Therapy Initial Evaluation   [place]     Patient Name: Alexander Rouse  : 1935  MRN: [id number]  Today's Date: 2021      Visit Dx:     ICD-10-CM ICD-9-CM   1. Pneumonia of left lower lobe due to infectious organism  J18.9 486   2. Acute on chronic respiratory failure with hypoxia (CMS/HCC)  J96.21 518.84     799.02   3. Oropharyngeal dysphagia  R13.12 787.22   4. Decreased activities of daily living (ADL)  Z78.9 V49.89     Patient Active Problem List   Diagnosis   • CAFL (chronic airflow limitation) (CMS/[facility])   • Cough   • Cancer of lower lobe of lung (CMS/HCC)   • Disease of larynx   • Rotator cuff tear arthropathy   • Status post complete repair of rotator cuff   • Nodule of right lung   • Troponin level elevated   • Hypothermia   • Altered mental status   • Abnormal chest CT   • Cavitary lesion of lung   • Pneumonia of left lower lobe due to infectious organism     Past Medical History:   Diagnosis Date   • Apnea, sleep    • Asthma    • COPD (chronic obstructive pulmonary disease) (CMS/[facility])    • Hyperlipidemia    • Hypertension    • TIA (transient ischemic attack)      Past Surgical History:   Procedure Laterality Date   • ABDOMINAL AORTIC ANEURYSM REPAIR      Son is unsure of the date   • BRONCHOSCOPY N/A 2021    Procedure: BRONCHOSCOPY WITH ENDOBRONCHIAL ULTRASOUND/BRONCHIAL ALVEOLAR LAVAGE/BIOPSIES/BRUSHINGS/WASHINGS;  Surgeon: Artur Randolph MD;  Location: [facility] ENDOSCOPY;  Service: Pulmonary;  Laterality: N/A;  LUNG ABCESS   • CARDIAC SURGERY     • CORONARY ANGIOPLASTY WITH STENT PLACEMENT     • HIP SURGERY     • LUNG CANCER SURGERY     • SHOULDER SURGERY Bilateral         PT Assessment (last 12 hours)      PT Evaluation and Treatment     Row Name 21 1400          Physical Therapy Time and Intention    Document Type  evaluation  (Pended)   -AV     Mode of Treatment  individual therapy;physical therapy  (Pended)   -AV     Row Name 21 1400          General  Information    Patient Profile Reviewed  yes  (Pended)   -AV     Patient Observations  alert;cooperative  (Pended)   -AV     Prior Level of Function  --  (Pended)  See OT note.  -AV     Equipment Currently Used at Home  power chair, (recliner lift);walker, rolling  (Pended)   -AV     Existing Precautions/Restrictions  fall  (Pended)   -AV     Row Name 08/06/21 1400          Living Environment    Current Living Arrangements  home/apartment/condo  (Pended)   -AV     Lives With  child(john), adult  (Pended)  Per EMR his 3 sons take turns staying with him  -AV     Row Name 08/06/21 1400          Home Use of Assistive/Adaptive Equipment    Equipment Currently Used at Home  power chair,(recliner lift);walker, rolling  (Pended)   -AV     Row Name 08/06/21 1400          Range of Motion (ROM)    Range of Motion  bilateral lower extremities;ROM is WFL  (Pended)   -AV     Row Name 08/06/21 1400          Strength (Manual Muscle Testing)    Strength (Manual Muscle Testing)  bilateral lower extremities  (Pended)  2/5  -AV     Row Name 08/06/21 1400          Bed Mobility    Bed Mobility  bed mobility (all) activities  (Pended)   -AV     All Activities, [place] (Bed Mobility)  maximum assist (25% patient effort);dependent (less than 25% patient effort)  (Pended)   -AV     Bed Mobility, Safety Issues  cognitive deficits limit understanding  (Pended)   -AV     Row Name 08/06/21 1400          Transfers    Comment (Transfers)  Further transfers deferred due to difficulty following commands and patient requiring max/dep assist for bed mobility.  (Pended)   -AV     Row Name 08/06/21 1400          Safety Issues, Functional Mobility    Safety Issues Affecting Function (Mobility)  ability to follow commands;awareness of need for assistance;insight into deficits/self-awareness;judgment;problem-solving;sequencing abilities  (Pended)   -AV     Impairments Affecting Function (Mobility)  balance;cognition;endurance/activity tolerance   (Pended)   -AV     Row Name 08/06/21 1400          Balance    Balance Assessment  sitting dynamic balance  (Pended)   -AV     Dynamic Sitting Balance  severe impairment;supported;long sitting  (Pended)   -AV     Row Name             Wound 08/02/21 2020 Left lower leg    Wound - Properties Group Placement Date: 08/02/21  -MM Placement Time: 2020  -MM Side: Left  -MM Orientation: lower  -MM Location: leg  -MM    Retired Wound - Properties Group Date first assessed: 08/02/21  -MM Time first assessed: 2020  -MM Side: Left  -MM Location: leg  -MM    Row Name             Wound 08/03/21 0411 sacral spine    Wound - Properties Group Placement Date: 08/03/21  -MM Placement Time: 0411  -MM Location: sacral spine  -MM    Retired Wound - Properties Group Date first assessed: 08/03/21  -MM Time first assessed: 0411  -MM Location: sacral spine  -MM    Row Name             Wound 08/06/21 0001 Left upper arm Skin Tear    Wound - Properties Group Placement Date: 08/06/21  -ET Placement Time: 0001  -ET Side: Left  -ET Orientation: upper  -ET Location: arm  -ET Primary Wound Type: Skin tear  -ET    Retired Wound - Properties Group Date first assessed: 08/06/21  -ET Time first assessed: 0001  -ET Side: Left  -ET Location: arm  -ET Primary Wound Type: Skin tear  -ET    Row Name 08/06/21 1400          Plan of Care Review    Plan of Care Reviewed With  patient  (Pended)   -AV     Progress  improving  (Pended)   -AV     Outcome Summary  Patient presents with deficits in balance, activity tolerance, and transfers. Patient will benefit from skilled PT services to improve these deficits and decrease risk of falls.  (Pended)   -AV     Row Name 08/06/21 1400          Physical Therapy Goals    Bed Mobility Goal Selection (PT)  bed mobility, PT goal 1  (Pended)   -AV     Transfer Goal Selection (PT)  transfer, PT goal 1  (Pended)   -AV     Strength Goal Selection (PT)  strength, PT goal 1  (Pended)   -AV     Row Name 08/06/21 1400          Bed  Mobility Goal 1 (PT)    Activity/Assistive Device (Bed Mobility Goal 1, PT)  bed mobility activities, all  (Pended)   -AV     [place] Level/Cues Needed (Bed Mobility Goal 1, PT)  contact guard assist  (Pended)   -AV     Time Frame (Bed Mobility Goal 1, PT)  10 days  (Pended)   -AV     Row Name 08/06/21 1400          Transfer Goal 1 (PT)    Activity/Assistive Device (Transfer Goal 1, PT)  transfers, all  (Pended)   -AV     [place] Level/Cues Needed (Transfer Goal 1, PT)  contact guard assist  (Pended)   -AV     Time Frame (Transfer Goal 1, PT)  10 days  (Pended)   -AV     Row Name 08/06/21 1400          Strength Goal 1 (PT)    Strength Goal 1 (PT)  4/5 BLE  (Pended)   -AV     Time Frame (Strength Goal 1, PT)  10 days  (Pended)   -AV     Row Name 08/06/21 1400          Therapy Assessment/Plan (PT)    Rehab Potential (PT)  good, to achieve stated therapy goals  (Pended)   -AV     Criteria for Skilled Interventions Met (PT)  yes  (Pended)   -AV     Problem List (PT)  problems related to;balance;cognition;mobility  (Pended)   -AV     Activity Limitations Related to Problem List (PT)  unable to ambulate safely;unable to transfer safely  (Pended)   -AV     Row Name 08/06/21 1400          PT Evaluation Complexity    History, PT Evaluation Complexity  1-2 personal factors and/or comorbidities  (Pended)   -AV     Examination of Body Systems (PT Eval Complexity)  total of 3 or more elements  (Pended)   -AV     Clinical Presentation (PT Evaluation Complexity)  stable  (Pended)   -AV     Clinical Decision Making (PT Evaluation Complexity)  low complexity  (Pended)   -AV     Overall Complexity (PT Evaluation Complexity)  low complexity  (Pended)   -AV     Row Name 08/06/21 1400          Therapy Plan Review/Discharge Plan (PT)    Therapy Plan Review (PT)  evaluation/treatment results reviewed;patient  (Pended)   -AV       User Key  (r) = Recorded By, (t) = Taken By, (c) = Cosigned By    Initials Name Provider Type    ET  Thorn, Erinne, RNA Registered Nurse    Newton Jeffries RN Registered Nurse    Donnell Hargrove, PT Physical Therapist        Physical Therapy Education                 Title: PT OT SLP Therapies (In Progress)     Topic: Physical Therapy (Done)     Point: Mobility training (Done)     Learning Progress Summary           Patient Acceptance, E,TB, VU by AV at 8/6/2021 1416    Acceptance, E,TB, DU,NR by MM at 8/2/2021 2056   Family Acceptance, E,TB, DU,NR by MM at 8/2/2021 2056                   Point: Home exercise program (Done)     Learning Progress Summary           Patient Acceptance, E,TB, VU by AV at 8/6/2021 1416    Acceptance, E,TB, DU,NR by MM at 8/2/2021 2056   Family Acceptance, E,TB, DU,NR by MM at 8/2/2021 2056                   Point: Body mechanics (Done)     Learning Progress Summary           Patient Acceptance, E,TB, VU by AV at 8/6/2021 1416    Acceptance, E,TB, DU,NR by MM at 8/2/2021 2056   Family Acceptance, E,TB, DU,NR by MM at 8/2/2021 2056                   Point: Precautions (Done)     Learning Progress Summary           Patient Acceptance, E,TB, VU by AV at 8/6/2021 1416    Acceptance, E,TB, DU,NR by MM at 8/2/2021 2056   Family Acceptance, E,TB, DU,NR by MM at 8/2/2021 2056                               User Key     Initials Effective Dates Name Provider Type Discipline    HECTOR 06/16/21 -  Newton Cardoso RN Registered Nurse Nurse    MELINA 06/11/21 -  Donnell Garzon, MANUEL Physical Therapist PT              PT Recommendation and Plan  Anticipated Discharge Disposition (PT): (P) long-term acute care facility, extended care facility, skilled nursing facility  Planned Therapy Interventions (PT): (P) balance training, bed mobility training, gait training, transfer training, strengthening, neuromuscular re-education  Therapy Frequency (PT): (P) daily  Plan of Care Reviewed With: (P) patient  Progress: (P) improving  Outcome Summary: (P) Patient presents with deficits in balance, activity  tolerance, and transfers. Patient will benefit from skilled PT services to improve these deficits and decrease risk of falls.  Outcome Measures     Row Name 08/06/21 1400             How much help from another person do you currently need...    Turning from your back to your side while in flat bed without using bedrails?  1  (Pended)   -AV      Moving from lying on back to sitting on the side of a flat bed without bedrails?  1  (Pended)   -AV      Moving to and from a bed to a chair (including a wheelchair)?  1  (Pended)   -AV      Standing up from a chair using your arms (e.g., wheelchair, bedside chair)?  1  (Pended)   -AV      Climbing 3-5 steps with a railing?  1  (Pended)   -AV      To walk in hospital room?  1  (Pended)   -AV      AM-PAC 6 Clicks Score (PT)  6  (Pended)   -AV         Functional Assessment    Outcome Measure Options  AM-PAC 6 Clicks Basic Mobility (PT)  (Pended)   -AV        User Key  (r) = Recorded By, (t) = Taken By, (c) = Cosigned By    Initials Name Provider Type    AV Donnell Garzon, PT Physical Therapist           Time Calculation:   PT Charges     Row Name 08/06/21 1401             Time Calculation    PT Received On  08/06/21  (Pended)   -AV      PT Goal Re-Cert Due Date  08/15/21  (Pended)   -AV         Untimed Charges    PT Eval/Re-eval Minutes  25  (Pended)   -AV         Total Minutes    Untimed Charges Total Minutes  25  (Pended)   -AV       Total Minutes  25  (Pended)   -AV        User Key  (r) = Recorded By, (t) = Taken By, (c) = Cosigned By    Initials Name Provider Type    AV Donnell Garzon, PT Physical Therapist        Therapy Charges for Today     Code Description Service Date Service Provider Modifiers Qty    61919983703 HC PT EVAL LOW COMPLEXITY 2 8/6/2021 Donnell Garzon, PT GP 1          PT G-Codes  Outcome Measure Options: (P) AM-PAC 6 Clicks Basic Mobility (PT)  AM-PAC 6 Clicks Score (PT): (P) 6  AM-PAC 6 Clicks Score (OT): 7    Donnell Garzon PT  8/6/2021

## 2021-08-06 NOTE — PROGRESS NOTES
"Pharmacy to Dose Vancomycin Day: 4    Consulting Provider: TALITA Chowdary  Clinical Indication: pneumonia  Goal -600 mg/L.hr  Duration of therapy: 7 days    182.9 cm (72\")       08/02/21  0412      Weight: 101 kg (221 lb 9 oz)         Estimated Creatinine Clearance: 41.5 mL/min (A) (by C-G formula based on SCr of 1.6 mg/dL (H)).    Results from last 7 days   Lab Units 08/06/21  0516 08/05/21  0514 08/04/21  0444 08/03/21  0519   BUN mg/dL 54* 38* 27* 17   CREATININE mg/dL 1.60* 1.32* 1.09 0.84     HD/PD/CRRT?: No    Lab Results   Component Value Date    WBC 11.95 (H) 08/06/2021      Temperature    08/05/21 2010 08/06/21 0015 08/06/21 0300   Temp: 98.2 °F (36.8 °C) 97.3 °F (36.3 °C) 97.9 °F (36.6 °C)     Contrast Administered: N    Relevant Micro:   Microbiology Results (last 10 days)       Procedure Component Value - Date/Time    MRSA Screen, PCR (Inpatient) - Swab, Nares [[id number]]  (Normal) Collected: 08/03/21 1606    Lab Status: Final result Specimen: Swab from Nares Updated: 08/03/21 1829     MRSA PCR No MRSA Detected    Legionella Antigen, Urine - Urine, Urine, Clean Catch [[id number]]  (Normal) Collected: 08/03/21 1605    Lab Status: Final result Specimen: Urine, Clean Catch Updated: 08/04/21 0757     LEGIONELLA ANTIGEN, URINE Negative    S. Pneumo Ag Urine or CSF - Urine, Urine, Clean Catch [[id number]]  (Normal) Collected: 08/03/21 1605    Lab Status: Final result Specimen: Urine, Clean Catch Updated: 08/04/21 0757     Strep Pneumo Ag Negative    COVID-19,CEPHEID,COR/VAN/PAD/DANNY IN-HOUSE(OR EMERGENT/ADD-ON),NP SWAB IN TRANSPORT MEDIA 3-4 HR TAT, RT-PCR - Swab, Nasopharynx [[id number]]  (Normal) Collected: 08/02/21 0626    Lab Status: Final result Specimen: Swab from Nasopharynx Updated: 08/02/21 1001     COVID19 Not Detected    Narrative:      Fact sheet for providers: https://www.fda.gov/media/408040/download     Fact sheet for patients: https://www.fda.gov/media/438576/download  Fact sheet for providers: " https://www.fda.gov/media/394214/download     Fact sheet for patients: https://www.fda.gov/media/263015/download    Blood Culture - Blood, Arm, Left [[id number]] Collected: 08/02/21 0436    Lab Status: Preliminary result Specimen: Blood from Arm, Left Updated: 08/06/21 0445     Blood Culture No growth at 4 days    Blood Culture - Blood, Arm, Left [[id number]] Collected: 08/02/21 0436    Lab Status: Preliminary result Specimen: Blood from Arm, Left Updated: 08/06/21 0445     Blood Culture No growth at 4 days           Lab Results   Component Value Date    VANCOTROUGH 24.09 (H) 08/06/2021         Relevant Radiology: Chest XR 8/2 possible new mild atelectasis and or infiltrates left lung base per radiologist.    Other Antimicrobial Therapy: Cefepime    Assessment/Plan  Regimen: 750 mg IV every 24 hours.  Start time: 09:25 on 08/06/2021  Exposure target: AUC24 (range)400-600 mg/L.hr   AUC24,ss: 473 mg/L.hr  PAUC*: 86 %  Ctrough,ss: 16.3 mg/L  Pconc*: 14 %  Tox.: 12 %    Vancomycin trough appropriately drawn at 0516 this morning, returned elevated at 24.09.  Will hold dose for today, will order a repeat level for the AM to assess clearance.  If Scr starts to improve and patient is clearing vanc appropriately would recommend restarting vancomycin at 750mg IV q24h per the above predictions.    Vancomycin trough ordered for 8/7 @ 0500 prior to dose.

## 2021-08-06 NOTE — PROGRESS NOTES
Pulmonary / Critical Care Consult Note      Patient Name: Alexander Rouse  : 1935  MRN: [id number]  Primary Care Physician:  Sharif Singer MD  Referring Physician: No ref. provider found  Date of admission: 2021    Subjective   Subjective     Reason for Consult/ Chief Complaint: Hypoxic respiratory failure    HPI:  Follow-up hypoxia  History limited to confusion  Patient is much more awake today still slightly confused but able to identify family numbers  Currently on 6L of nasal cannula  Ate all his breakfast this morning    Review of Systems  General positive for weakness  GI-negative for nausea vomiting or diarrhea  Cards-negative for chest pain  Pulmonary-positive for shortness of breath and cough    Personal History     Past Medical History:   Diagnosis Date   • Apnea, sleep    • Asthma    • COPD (chronic obstructive pulmonary disease) (CMS/HCC)    • Hyperlipidemia    • Hypertension    • TIA (transient ischemic attack)        Past Surgical History:   Procedure Laterality Date   • ABDOMINAL AORTIC ANEURYSM REPAIR      Son is unsure of the date   • BRONCHOSCOPY N/A 2021    Procedure: BRONCHOSCOPY WITH ENDOBRONCHIAL ULTRASOUND/BRONCHIAL ALVEOLAR LAVAGE/BIOPSIES/BRUSHINGS/WASHINGS;  Surgeon: Artur Randolph MD;  Location: [facility] ENDOSCOPY;  Service: Pulmonary;  Laterality: N/A;  LUNG ABCESS   • CARDIAC SURGERY     • CORONARY ANGIOPLASTY WITH STENT PLACEMENT     • HIP SURGERY     • LUNG CANCER SURGERY     • SHOULDER SURGERY Bilateral        Family History: Family history is unknown by patient. Otherwise pertinent FHx was reviewed and not pertinent to current issue.    Social History:  reports that he quit smoking about 3 months ago. He does not have any smokeless tobacco history on file. He reports that he does not drink alcohol and does not use drugs.    Home Medications:  Selenium, albuterol, apixaban, aspirin, atorvastatin, carvedilol, cetirizine, cholecalciferol, furosemide, guaiFENesin,  ipratropium-albuterol, mometasone-formoterol, montelukast, nitroglycerin, omeprazole, and saccharomyces boulardii    Allergies:  No Known Allergies    Objective    Objective     Vitals:   Temp:  [97.3 °F (36.3 °C)-98.2 °F (36.8 °C)] 97.6 °F (36.4 °C)  Heart Rate:  [75-94] 85  Resp:  [18-26] 20  BP: (101-158)/(44-96) 113/44  Flow (L/min):  [3-6] 6    Physical Exam:  Vital Signs Reviewed   General: WDWN male, Lehtargic, opens eyes to stimulation and answers simple questions but quickly goes back to sleep, NAD on 7L HFNC    HEENT:  PERRL, EOMI.  OP, nares clear, no sinus tenderness  Neck:  Supple, no JVD, no thyromegaly  Lymph: no axillary, cervical, supraclavicular lymphadenopathy noted bilaterally  Chest:  poor aeration, coarse crackles and rhonchi on right, tympanic to percussion bilaterally, no work of breathing noted  CV: RRR, no MGR, pulses 2+, equal  Abd:  Soft, NT, ND, + BS, no HSM  EXT:  no clubbing, no cyanosis, no edema, no joint tenderness  Neuro:  Lethargic, moves all 4 extremities, CN grossly intact, no focal deficits  Skin: No rashes or lesions noted    Result Review    Result Review:  I have personally reviewed the results from the time of this admission to 8/6/2021 13:02 EDT and agree with these findings:  [x]  Laboratory  [x]  Microbiology  [x]  Radiology  [x]  EKG/Telemetry   []  Cardiology/Vascular   []  Pathology  [x]  Old records  []  Other:  Most notable findings include: WBC 7.87, Procal 0.1, Hgb 8.8, Cr 0.84, K+ 3.4, Mg+ 1.7, PO4 3.3, troponin 0.03.  CXR with new infiltrates to right lung base since previous CXR on 7/14/21.  CT chest with worsening RLL infiltrates, 9mm non-calcified LLL nodule unchanged from study on 5/2019.  Reviewed previous CXR and CT scans.    Assessment/Plan   Assessment / Plan     Active Hospital Problems:  Active Hospital Problems    Diagnosis    • Pneumonia of left lower lobe due to infectious organism      Impression:  Acute hypoxic respiratory failure requiring  HFNC  Recurrent RLL pneumonia  Recent Proteus and MRSA pneumonia  Question aspiration related  Hx of NSCL lung cancer with RUL resection in 2012  Diastolic heart failure   COPD with acute exacerbation    Plan:  Mental status much better today  Continue Brovana and Pulmicort  Change duo nebs to as needed  Continue t bronchopulmonary hygiene.  Continue with percussion therapy.    Encourage IS and flutter valve when mentation better.  Transition from IV Solu-Medrol to oral prednisone  Continue to avoid sedating medications  Continue antibiotics  I feel that there is no need for any bronchoscopy at this time  Labs, microbiology, radiology, medications, and provider notes personally reviewed.  Discussed with primary service and bedside RN.  N/A  by Anmol Walsh DO, 8/6/2021, 13:02 EDT.    Electronically signed by Anmol Walsh DO, 08/06/21, 1:03 PM EDT.

## 2021-08-06 NOTE — CASE MANAGEMENT/SOCIAL WORK
Pt  Is not a long term resident at [facility].  Pt was discharged from [facility] on 7/14/21.    SW will follow for discharge needs.

## 2021-08-07 NOTE — PLAN OF CARE
Goal Outcome Evaluation:      PATIENT HAS BEEN NPO, CORTRAK PLACED IN LEFT NARES AND CLAMPED @ 80

## 2021-08-07 NOTE — PROGRESS NOTES
[facility]   Hospitalist Progress Note  Date: 2021  Patient Name: Alexander Rouse  : 1935  MRN: [id number]  Date of admission: 2021      Subjective   Subjective     Chief Complaint: Shortness of breath    Summary: 85 y.o. male with history of COPD, asthma, hyperlipidemia, systolic and diastolic CHF who was recently admitted to our facility from - for a lung abscess and right lower lobe postobstructive pneumonia.  [facility]  He was doing well up until today when he became short of breath.  History is obtained by the patient's son and ED physician as the patient is currently confused.  Staff found the patient to be difficult to arouse, they checked his oxygen saturation he was found to be 75% on 4 L nasal cannula.  He was brought to the emergency department where he was transitioned to 8 L high flow with improvement in his saturations.  Patient completed recent antibiotic therapy on  of cefdinir and  of Zyvox.  He is vaccinated against COVID-19.     In the emergency department, patient required 8 L high flow nasal cannula to maintain saturations. He was tachypneic in the 20s.  Heart rate and blood pressure stable.  Laboratory evaluation revealed elevated troponin at 0.36, however this appears to be chronic for the patient.  There is no leukocytosis.  BMP within normal limits.  Chest x-ray revealed left lower lobe pneumonia.  He was given vancomycin and cefepime in the emergency department and the hospitalist team was contacted to admit for further management.     Interval Followup: Patient remained lethargic, not answering my questions this morning.  Still on about 6 L high flow nasal cannula.  Had a long discussion with the son at bedside regarding goals of care.  At this point, I feel the patient has a poor prognosis due to his worsening dementia in setting of previous stroke and vascular dementia.  He ate yesterday and had worsening hypoxia,  likely aspirated again.  He has been made strict n.p.o.  Son would like to proceed with short-term feeding tube but is not sure if he would want it replaced that the patient pulled out.  He is unsure if the patient would want a permanent feeding tube.  I explained to him that the feeding tube has not been shown to improve the longevity of a patient with dementia.  A core track was placed later in the morning and the patient had worsening hypoxia requiring BiPAP.    Review of Systems   A 10 point review of systems was attempted to be obtained but could not due to the patient's mentation    Objective   Objective     Vitals:   Temp:  [97.9 °F (36.6 °C)-98.4 °F (36.9 °C)] 98.1 °F (36.7 °C)  Heart Rate:  [] 100  Resp:  [14-24] 24  BP: (104-124)/(46-69) 113/66  Flow (L/min):  [4-8] 8  Physical Exam    Constitutional: Lethargic, not answering any questions, no acute distress   Eyes: Pupils equal, sclerae anicteric, no conjunctival injection   HENT: NCAT, mucous membranes moist   Neck: Supple, no thyromegaly, no lymphadenopathy, trachea midline   Respiratory: Crackles noted throughout, high flow nasal cannula in place, nonlabored respirations    Cardiovascular: RRR, no murmurs, rubs, or gallops   Gastrointestinal: Positive bowel sounds, soft, nontender, nondistended   Musculoskeletal: No bilateral ankle edema, no clubbing or cyanosis to extremities   Psychiatric: Appropriate affect, cooperative   Neurologic: Lethargic, oriented x 0, does not answer any questions, strength symmetric in all extremities, not following commands, cranial Nerves grossly intact    Skin: No rashes     Result Review    Result Review:  I have personally reviewed the results from the time of this admission to 8/7/2021 15:38 EDT and agree with these findings:  [x]  Laboratory  [x]  Microbiology  [x]  Radiology  [x]  EKG/Telemetry   []  Cardiology/Vascular   []  Pathology  []  Old records  []  Other:  Telemetry reviewed showed normal sinus  rhythm    Assessment/Plan   Assessment / Plan     Assessment/Plan:  Acute hypoxic and hypercapnic respiratory failure requiring high flow nasal cannula/NIPPV  Recurrent right lower lobe pneumonia   Recurrent aspiration  Hypernatremia  Metabolic alkalosis  JOSE RAUL  Chronically elevated troponin  JOSE RAUL  COPD, does not appear acutely exacerbated  Acute on chronic combined systolic and diastolic congestive heart failure with acute exacerbation  Hyperlipidemia  Recent right lower lobe postobstructive pneumonia with abscess    Continue to monitor on telemetry for management of the above  Pulmonology following, appreciate assistance  Continue continue cefepime alone  Continue prednisone 40 mg daily  Continue percussion therapy, encourage I-S and flutter valve  Continue duo nebs every 4 hours, Pulmicort and Brovana twice daily, albuterol as needed, bronchopulmonary hygiene  Core track placed today, patient had worsening respiratory status.  KUB and chest x-ray personally reviewed, core track is in adequate position, chest x-ray essentially unchanged  Will use BiPAP settings 12/6 for now and wean as tolerated  Echo reviewed with significant systolic dysfunction, EF 30%  Patient's overall prognosis remains poor.  Recommend hospice referral but family is not ready to make this decision currently  Trend renal function and electrolytes with a.m. BMP  Trend Hgb and WBC with a.m. CBC  Clinical course will dictate further management     Discussed plan with RN, bedside RN, pulmonology    DVT prophylaxis:  Medical and mechanical DVT prophylaxis orders are present.    CODE STATUS:   Limited Support to NOT Include: Intubation  Code Status: No CPR  Medical Interventions (Level of Support Prior to Arrest): Limited      Electronically signed by Shravan Levy MD, 08/07/21, 3:38 PM EDT.

## 2021-08-07 NOTE — PLAN OF CARE
Goal Outcome Evaluation:      Has been having episodes of dyspnea and required Respiratory intervention to maintain O2 sats above 90%. Currently back on nasal O2 and maintaining at upper 90's. .me

## 2021-08-07 NOTE — CONSULTS
"Nutrition Services    Patient Name: Alexander Rouse  YOB: 1935  MRN: [id number]  Admission date: 8/2/2021      CLINICAL NUTRITION ASSESSMENT      Reason for Assessment  Physician consult, EN     H&P:    Past Medical History:   Diagnosis Date   • Apnea, sleep    • Asthma    • COPD (chronic obstructive pulmonary disease) (CMS/[facility])    • Hyperlipidemia    • Hypertension    • TIA (transient ischemic attack)         Current Problems:   Active Hospital Problems    Diagnosis    • Pneumonia of left lower lobe due to infectious organism         Nutrition/Diet History         Narrative     Patient had cortrak placed today for enteral nutrition. Recommend Fibersource HN @ goal rate of 69 ml/hr to provide 1987 kcal, 89 g protein, 1341 ml fluid. Additional free water flushes of 110 ml q 4 hrs. Patient has hypernatremia (Na+ 150 today), so will adjust free water flushes as needed to correct.      Anthropometrics        Current Height, Weight Height: 182.9 cm (72\")  Weight: 101 kg (221 lb 9 oz)   Current BMI Body mass index is 30.05 kg/m².       Weight Hx  Wt Readings from Last 30 Encounters:   08/02/21 0412 101 kg (221 lb 9 oz)   07/20/21 0500 91.7 kg (202 lb 2.6 oz)   07/19/21 0500 92.3 kg (203 lb 7.8 oz)   07/18/21 0500 90.5 kg (199 lb 8.3 oz)   07/17/21 0600 92.7 kg (204 lb 5.9 oz)   07/16/21 0500 91 kg (200 lb 9.9 oz)   07/15/21 0442 88.8 kg (195 lb 12.3 oz)   07/14/21 2100 89.1 kg (196 lb 6.9 oz)   07/14/21 1433 89.5 kg (197 lb 5 oz)   04/21/21 0000 94.8 kg (209 lb)   01/13/21 0000 97.1 kg (214 lb)   10/13/20 0000 93.9 kg (207 lb)   08/18/20 0000 93.9 kg (207 lb)   02/12/20 0000 90.3 kg (199 lb)   08/06/19 0000 85.7 kg (189 lb)   02/05/19 0000 94.8 kg (209 lb)   07/25/18 0000 96.2 kg (212 lb)   01/31/18 1418 103 kg (226 lb)   01/24/18 0000 103 kg (227 lb)   08/23/17 0917 93.1 kg (205 lb 3.2 oz)   11/09/16 0955 90.7 kg (200 lb)   07/05/16 0818 93 kg (205 lb)   04/15/15 0930 95.3 kg (210 lb 0.2 oz)   09/17/14 0927 " 93.2 kg (205 lb 7.9 oz)   05/06/14 0923 91.4 kg (201 lb 8 oz)   11/12/13 0852 94.9 kg (209 lb 4.2 oz)   05/30/13 0856 96.6 kg (213 lb 0.1 oz)              Estimated/Assessed Needs       Energy Requirements    EST Needs (kcal/day) 8939-6935 kcal       Protein Requirements    EST Daily Needs (g/day) 78-93 g protein       Fluid Requirements     Estimated Needs (mL/day) 0585-5229 ml fluid      Labs/Medications         Pertinent Labs Reviewed.   Results from last 7 days   Lab Units 08/07/21  1207 08/06/21  0516 08/05/21  0514 08/04/21  0444 08/03/21  0519 08/02/21  0436 08/02/21  0436   SODIUM mmol/L 150* 147* 145   < > 141   < > 141   POTASSIUM mmol/L 3.8 3.7 3.6   < > 3.4*   < > 3.4*   CHLORIDE mmol/L 105 102 99   < > 97*   < > 98   CO2 mmol/L 33.6* 36.8* 37.5*   < > 36.6*   < > 36.7*   BUN mg/dL 65* 54* 38*   < > 17   < > 16   CREATININE mg/dL 1.67* 1.60* 1.32*   < > 0.84   < > 0.82   CALCIUM mg/dL 8.7 8.5* 8.6   < > 8.4*   < > 8.4*   BILIRUBIN mg/dL  --   --   --   --  0.5  --  0.5   ALK PHOS U/L  --   --   --   --  91  --  90   ALT (SGPT) U/L  --   --   --   --  15  --  16   AST (SGOT) U/L  --   --   --   --  18  --  18   GLUCOSE mg/dL 101* 132* 169*   < > 67   < > 98    < > = values in this interval not displayed.     Results from last 7 days   Lab Units 08/07/21  0941 08/06/21  0516 08/04/21  0444 08/04/21 0444 08/03/21  0519   MAGNESIUM mg/dL 2.2 2.2  --  1.9  --    PHOSPHORUS mg/dL 3.1  --   --   --    < >   HEMOGLOBIN g/dL 9.1* 8.5*   < > 9.2*   < >   HEMATOCRIT % 31.2* 28.7*   < > 31.1*   < >    < > = values in this interval not displayed.     COVID19   Date Value Ref Range Status   08/02/2021 Not Detected Not Detected - Ref. Range Final     No results found for: HGBA1C      Pertinent Medications Reviewed.     Current Nutrition Orders & Evaluation of Intake       Oral Nutrition     Current PO Diet NPO Diet   Supplement Orders Placed This Encounter      Place Feeding Tube Per [employer] System      Diet, Tube  Feeding Tube Feeding Formula: Fibersource HN (Jevity 1.2); Tube Feeding Type: Continuous; Continuous Tube Feeding Start Rate (mL/hr): 25; Then Advance Rate By (mL/hr): 25; Every __ Hours: 4; To Goal Rate of (mL/hr): 69       Nutrition Diagnosis         Nutrition Dx Problem 1 Inadequate energy Intake related to decreased ability to consume sufficient energy as evidenced by NPO, PO diet not tolerated. and altered mental status.       Nutrition Intervention         Fibersource HN @ goal rate of 69 ml/hr with free water flushes of 110 ml q 4 hrs.      Monitor/Evaluation        Monitor monitor/eval: Per protocol, I&O, Pertinent labs, EN delivery/tolerance, Weight, POC/GOC, Swallow function, Diet advancement       Electronically signed by:  Ashok Polanco RD  08/07/21 13:51 EDT

## 2021-08-07 NOTE — PROGRESS NOTES
Pulmonary / Critical Care Consult Note      Patient Name: Alexander Rouse  : 1935  MRN: [id number]  Primary Care Physician:  Sharif Singer MD  Referring Physician: No ref. provider found  Date of admission: 2021    Subjective   Subjective     Reason for Consult/ Chief Complaint: Hypoxic respiratory failure    Over the past 24 hours  Concern for aspiration given worsening in respiratory status requiring increasing supplemental O2   Patient was made n.p.o.   Family decided to agree to cortrak for nutrition-with goal for home with  care    HPI:  Awake but not verbally responding today like he was yesterday  SonBayron is at bedside  Family is still hoping for cortrak placement for nutrition  On 8 LNC  Remains n.p.o.  Discussed proceeding forward with bronchoscopy early next week if still requiring increased amount of supplemental O2    Review of Systems  Unable to obtain as patient is not verbally responding to questions today    Personal History     Past Medical History:   Diagnosis Date   • Apnea, sleep    • Asthma    • COPD (chronic obstructive pulmonary disease) (CMS/HCC)    • Hyperlipidemia    • Hypertension    • TIA (transient ischemic attack)        Past Surgical History:   Procedure Laterality Date   • ABDOMINAL AORTIC ANEURYSM REPAIR      Son is unsure of the date   • BRONCHOSCOPY N/A 2021    Procedure: BRONCHOSCOPY WITH ENDOBRONCHIAL ULTRASOUND/BRONCHIAL ALVEOLAR LAVAGE/BIOPSIES/BRUSHINGS/WASHINGS;  Surgeon: Artur Randolph MD;  Location: [facility] ENDOS[facility];  Service: Pulmonary;  Laterality: N/A;  LUNG ABCESS   • CARDIAC SURGERY     • CORONARY ANGIOPLASTY WITH STENT PLACEMENT     • HIP SURGERY     • LUNG CANCER SURGERY     • SHOULDER SURGERY Bilateral        Family History: Family history is unknown by patient. Otherwise pertinent FHx was reviewed and not pertinent to current issue.    Social History:  reports that he quit smoking about 3 months ago. He does not have any smokeless  tobacco history on file. He reports that he does not drink alcohol and does not use drugs.    Home Medications:  Selenium, albuterol, apixaban, aspirin, atorvastatin, carvedilol, cetirizine, cholecalciferol, furosemide, guaiFENesin, ipratropium-albuterol, mometasone-formoterol, montelukast, nitroglycerin, omeprazole, and saccharomyces boulardii    Allergies:  No Known Allergies    Objective    Objective     Vitals:   Temp:  [97.6 °F (36.4 °C)-98.4 °F (36.9 °C)] 98.4 °F (36.9 °C)  Heart Rate:  [80-89] 85  Resp:  [16-24] 18  BP: (104-124)/(42-69) 123/69  Flow (L/min):  [4-8] 8    Physical Exam:  Vital Signs Reviewed   General: WDWN male, awake, NAD, not communicating during exam/rounds today   HEENT:  PERRL, EOMI.  OP, nares clear,MMM  Chest:  poor aeration, coarse crackles and rhonchi on right, no work of breathing noted  CV: RRR, no MGR, pulses 2+, equal  Abd:  Soft, NT, ND, + BS, no HSM  EXT:  no clubbing, no cyanosis, no edema, no joint tenderness  Neuro:  Lethargic, moves all 4 extremities, is not verbally communicating with staff today  Skin: No rashes or lesions noted    Result Review    Result Review:  I have personally reviewed the results from the time of this admission to 8/7/2021 09:15 EDT and agree with these findings:  [x]  Laboratory  [x]  Microbiology  [x]  Radiology  [x]  EKG/Telemetry   []  Cardiology/Vascular   []  Pathology  [x]  Old records  []  Other:    Assessment/Plan   Assessment / Plan     Active Hospital Problems:  Active Hospital Problems    Diagnosis    • Pneumonia of left lower lobe due to infectious organism      Impression:  Acute hypoxic respiratory failure requiring HFNC  Recurrent RLL pneumonia  Recent Proteus and MRSA pneumonia  Question aspiration related  Hx of NSCL lung cancer with RUL resection in 2012  Diastolic heart failure   COPD with acute exacerbation    Plan:  Last night patient was with episode of respiratory distress requiring increased O2 overnight.  Chest x-ray was  obtained with findings concerning for aspiration.  Patient is now n.p.o.    Plan is for cortrak placement with tube feeds  Continues on increased amount of supplemental O2 at 8 LNC.  Discussed proceeding forward with bronchoscopy early next week.  Family is agreeable at this time.  Discussed risk and benefits.  Will obtain informed consent.  Continue with pulmonary toilet, aggressive nebulizers, airway clearance therapies.  Will likely need bronchoscopy.  Continue Brovana and Pulmicort  Change duo nebs to as needed  Continue bronchopulmonary hygiene.  Continue with percussion therapy.    Continue prednisone  Continue to avoid sedating medications  Continue antibiotics      Labs, microbiology, radiology, medications, and provider notes personally reviewed.  Discussed with primary service and bedside RN.    Electronically signed by BALDOMERO Luevano, 08/07/21, 1:07 PM EDT.  Electronically signed by Juancarlos Spring MD, 08/07/21, 5:22 PM EDT.

## 2021-08-08 NOTE — PROGRESS NOTES
[facility]     Progress Note             Patient Name: Alexander Rouse  : 1935  MRN: [id number]  Primary Care Physician:  Sharif Singer MD  Date of admission: 2021    Subjective     Subjective       Present illness:  The son and 2 granddaughters are on the bedside.    This afternoon, he is unresponsive.  He extremely lethargic.  Does not respond to verbal and painful stimulation.  There is no spontaneous movement in both upper and lower extremities.    Review of Systems    There is no headache dizziness nausea or vomiting.  No chest pains or abdominal pains.    Personal History     Past Medical History:   Diagnosis Date   • Apnea, sleep    • Asthma    • COPD (chronic obstructive pulmonary disease) (CMS/HCC)    • Hyperlipidemia    • Hypertension    • TIA (transient ischemic attack)        Past Surgical History:   Procedure Laterality Date   • ABDOMINAL AORTIC ANEURYSM REPAIR      Son is unsure of the date   • BRONCHOSCOPY N/A 2021    Procedure: BRONCHOSCOPY WITH ENDOBRONCHIAL ULTRASOUND/BRONCHIAL ALVEOLAR LAVAGE/BIOPSIES/BRUSHINGS/WASHINGS;  Surgeon: Artur Randolph MD;  Location: [facility];  Service: Pulmonary;  Laterality: N/A;  LUNG ABCESS   • CARDIAC SURGERY     • CORONARY ANGIOPLASTY WITH STENT PLACEMENT     • HIP SURGERY     • LUNG CANCER SURGERY     • SHOULDER SURGERY Bilateral        Family History: Family history is unknown by patient. Otherwise pertinent FHx was reviewed and not pertinent to current issue.    Social History:  reports that he quit smoking about 3 months ago. He does not have any smokeless tobacco history on file. He reports that he does not drink alcohol and does not use drugs.      Home Medications:  Selenium, albuterol, apixaban, aspirin, atorvastatin, carvedilol, cetirizine, cholecalciferol, furosemide, guaiFENesin, ipratropium-albuterol, mometasone-formoterol, montelukast, nitroglycerin, omeprazole, and saccharomyces boulardii      Allergies:  No  Known Allergies    Objective   Objective     Vitals:   Temp:  [97.1 °F (36.2 °C)-98.8 °F (37.1 °C)] (P) 98.1 °F (36.7 °C)  Heart Rate:  [87-97] 97  Resp:  [16-29] (P) 26  BP: (108-121)/(54-67) (P) 121/76    Physical Exam   He is extremely lethargic.  There is no response to verbal and painful stimulation.    His heart was regular.  Heart rate was 78/min.  No murmurs his lungs are clear.    There is no spontaneous movement in both upper and lower extremities.    Positive tendon reflexes were hypoactive to absent on both sides.      Result Review    Result Review:  I have personally reviewed the results from the time of this admission to 8/8/2021 18:13 EDT and agree with these findings:  []  Laboratory  []  Microbiology  []  Radiology  []  EKG/Telemetry   []  Cardiology/Vascular   []  Pathology  []  Old records  [x]  Other: EEG most notable findings include:   The EEG is Abnormal because of slow background activity admixed with slower waves compatible with moderate and diffuse encephalopathy. There were no epileptiform discharges noted.    I reviewed the computer images of the MRI of the brain that was done on 8/5/2021.  The studies technically unsatisfactory because of too much movement artifact.  There were no discernible acute changes noted.    I also reviewed the computer images of the MR angiography of the neck and cerebral vessels done on 8/5/2021.  The study showed no hemodynamically significant stenosis or narrowing noted of the carotid and vertebral arterial system in the neck and their branches in the brain.       Assessment/Plan   Assessment / Plan     Impression:    Altered Mental State  Metabolic encephalopathy  Acute Hypoxic Respiratory Failure  Cerebral Infarction  Nonconvulsive Seizure Disorder     Active Hospital Problems:  Active Hospital Problems    Diagnosis    • Pneumonia of left lower lobe due to infectious organism      Plan:   Continue present supportive and symptomatic treatment.  We will add  thiamine to therapy and see how he does.    DVT prophylaxis:  Mechanical DVT prophylaxis orders are present.    CODE STATUS:    Level Of Support Discussed With: Health Care Surrogate  Code Status: No CPR  Medical Interventions (Level of Support Prior to Arrest): Comfort Measures        Electronically signed by Jake De La Cruz Jr., MD, 08/08/21, 6:37 PM EDT.

## 2021-08-08 NOTE — NURSING NOTE
Unable to keep tube feedings running and unable to flush or aspirate tubing. Have spent much of the shift trying to keep it going. Feed pump and feed tubing changed several times. Bi ALEXANDRE notified for orders and she wanted to hold feeding until the dietary team is able to evaluate tube. .me

## 2021-08-08 NOTE — PROGRESS NOTES
Pulmonary / Critical Care Consult Note      Patient Name: Alexander Rouse  : 1935  MRN: [id number]  Primary Care Physician:  Sharif Sniger MD  Referring Physician: No ref. provider found  Date of admission: 2021    Subjective   Subjective     Reason for Consult/ Chief Complaint: Hypoxic respiratory failure    Over the past 24 hours  Cortrak placed - but with difficulty getting tube feeds to run - tube feeds put on hold  Patient's respiratory status worsened and was placed on BiPAP 12/6 backup rate of 4 with O2 75%    Overnight  Continued on NIPPV, continues pulse oximeter in place    HPI:  Family in room  Patient is on BiPAP-12/6, O2 75%-SPO2 88-89  Cortrak in place -tube feeds on hold  Remains n.p.o.  Had discussion with family in regards to proceeding with palliative care versus transferring patient to ICU for intubation and bronchoscopy given his worsening in respiratory status requiring high O2 and BiPAP continuous    Review of Systems  Unable to obtain as patient is not verbally responding to questions today    Personal History     Past Medical History:   Diagnosis Date   • Apnea, sleep    • Asthma    • COPD (chronic obstructive pulmonary disease) (CMS/HCC)    • Hyperlipidemia    • Hypertension    • TIA (transient ischemic attack)        Past Surgical History:   Procedure Laterality Date   • ABDOMINAL AORTIC ANEURYSM REPAIR      Son is unsure of the date   • BRONCHOSCOPY N/A 2021    Procedure: BRONCHOSCOPY WITH ENDOBRONCHIAL ULTRASOUND/BRONCHIAL ALVEOLAR LAVAGE/BIOPSIES/BRUSHINGS/WASHINGS;  Surgeon: Artur Randolph MD;  Location: [facility];  Service: Pulmonary;  Laterality: N/A;  LUNG ABCESS   • CARDIAC SURGERY     • CORONARY ANGIOPLASTY WITH STENT PLACEMENT     • HIP SURGERY     • LUNG CANCER SURGERY     • SHOULDER SURGERY Bilateral        Family History: Family history is unknown by patient. Otherwise pertinent FHx was reviewed and not pertinent to current issue.    Social  History:  reports that he quit smoking about 3 months ago. He does not have any smokeless tobacco history on file. He reports that he does not drink alcohol and does not use drugs.    Home Medications:  Selenium, albuterol, apixaban, aspirin, atorvastatin, carvedilol, cetirizine, cholecalciferol, furosemide, guaiFENesin, ipratropium-albuterol, mometasone-formoterol, montelukast, nitroglycerin, omeprazole, and saccharomyces boulardii    Allergies:  No Known Allergies    Objective    Objective     Vitals:   Temp:  [97.1 °F (36.2 °C)-98.8 °F (37.1 °C)] (P) 98.8 °F (37.1 °C)  Heart Rate:  [] 97  Resp:  [14-29] 27  BP: (108-121)/(54-67) (P) 120/92  Flow (L/min):  [8] 8    Physical Exam:  Vital Signs Reviewed   General: WDWN male, NAD, on BiPAP, not verbally responding  Chest:  poor aeration, coarse rhonchi appreciated throughout, inspiratory crackles in bases, no work of breathing noted  CV: RRR, no MGR, pulses 2+, equal  Abd:  Soft, NT, ND, + BS, no HSM  EXT:  no clubbing, no cyanosis, no edema, no joint tenderness  Neuro:  Lethargic, moves all 4 extremities, is not verbally communicating with staff today  Skin: No rashes or lesions noted    Result Review    Result Review:  I have personally reviewed the results from the time of this admission to 8/8/2021 10:11 EDT and agree with these findings:  [x]  Laboratory  [x]  Microbiology  [x]  Radiology  [x]  EKG/Telemetry   []  Cardiology/Vascular   []  Pathology  [x]  Old records  []  Other:     Labs reviewed: WBC uptrending at 25 (13); hemoglobin up trended slightly at 9.6; creatinine 1.8    Assessment/Plan   Assessment / Plan     Active Hospital Problems:  Active Hospital Problems    Diagnosis    • Pneumonia of left lower lobe due to infectious organism      Impression:  Acute hypoxic respiratory failure requiring HFNC  Recurrent RLL pneumonia  Recent Proteus and MRSA pneumonia  Question aspiration related  Hx of NSCL lung cancer with RUL resection in 2012  Diastolic  heart failure   COPD with acute exacerbation    Plan:  After Cortrak placement patient was with episode of respiratory distress requiring transition to NIPPV with high O2 requirements of 75%.  Patient has continued on NIPPV overnight and still this morning.  O2 sats are 87-90%  Long discussion with family at bedside today in regards to how they want to proceed.   Overall patient is with poor prognosis and is candidate for comfort care.    Currently patient is already DNR status.  I have recommended palliative care and hospice.  Bronchoscopy was supposed to be a short-term fix.  But patient has decompensated and at this time is not stable to even get any procedure.  Palliative care consulted to discuss goals of care with family  Cortrak in place.  Tube feeds on hold due to difficulty overnight getting equipment to work appropriately  Continue with pulmonary toilet, aggressive nebulizers, airway clearance therapies.  Can NT suction if needed.  Continue with percussion therapy  Continue Brovana and Pulmicort  Change duo nebs to as needed  Continue prednisone  Continue to avoid sedating medications  Continue Zosyn  Grim prognosis.    Labs, microbiology, radiology, medications, and provider notes personally reviewed.  Discussed with primary service and bedside RN.    Electronically signed by BALDOMERO Luevano, 08/08/21, 12:05 PM EDT.  Electronically signed by Juancarlos Spring MD, 08/08/21, 4:10 PM EDT.

## 2021-08-08 NOTE — PROGRESS NOTES
[facility]   Hospitalist Progress Note  Date: 2021  Patient Name: Alexander Rouse  : 1935  MRN: [id number]  Date of admission: 2021      Subjective   Subjective     Chief Complaint: Shortness of breath    Summary: 85 y.o. male with history of COPD, asthma, hyperlipidemia, systolic and diastolic CHF who was recently admitted to our facility from - for a lung abscess and right lower lobe postobstructive pneumonia.  [facility]  He was doing well up until today when he became short of breath.  History is obtained by the patient's son and ED physician as the patient is currently confused.  Staff found the patient to be difficult to arouse, they checked his oxygen saturation he was found to be 75% on 4 L nasal cannula.  He was brought to the emergency department where he was transitioned to 8 L high flow with improvement in his saturations.  Patient completed recent antibiotic therapy on  of cefdinir and  of Zyvox.  He is vaccinated against COVID-19.     In the emergency department, patient required 8 L high flow nasal cannula to maintain saturations. He was tachypneic in the 20s.  Heart rate and blood pressure stable.  Laboratory evaluation revealed elevated troponin at 0.36, however this appears to be chronic for the patient.  There is no leukocytosis.  BMP within normal limits.  Chest x-ray revealed left lower lobe pneumonia.  He was given vancomycin and cefepime in the emergency department and the hospitalist team was contacted to admit for further management.  Patient was treated for recurrent aspiration with broad-spectrum antibiotics, bronchodilators, diuretics.  Speech, PT/OT were consulted.  He was cleared for modified diet initially.  Pulmonology was consulted.  Patient had worsening mentation for which neurology was consulted.  EEG and MRI were essentially negative.  After discussing with the family, the patient has had a stroke in the past  with worsening dementia over the last few years.  Even on good days he is able to speak but does not have a cohesive conversation.  The patient has had worsening dysphagia and swallowing trouble as his dementia has progressed.  Initially, they hoped that the patient would improve during his hospital stay but unfortunately he did not.  He had one morning where he was a little more awake and alert, and he ate all of his breakfast.  This was followed by significant worsening respiratory status likely due to recurrent aspiration.  He was placed on NIPPV.  Core track was placed for tube feeding.  Palliative care was consulted due to poor prognosis and goals of care discussion.  Ultimately, the decision was made to pursue comfort measures only and discontinue BiPAP and tube feeding.     Interval Followup: Patient is remained on BiPAP overnight with borderline saturations.  Tube feeding had to be discontinued as apparently the BiPAP was interfering with the flow.  Patient is lethargic, somnolent, not answering any questions appropriately.  Had a long discussion with the family over the phone, at this point I strongly recommend hospice and comfort care.  His son and POMARK Alanis agrees that has been a long slow decline and this is likely the end for him.    Review of Systems   A 10 point review of systems was attempted to be obtained but could not due to the patient's mentation    Objective   Objective     Vitals:   Temp:  [97.1 °F (36.2 °C)-98.8 °F (37.1 °C)] (P) 98.1 °F (36.7 °C)  Heart Rate:  [87-97] 97  Resp:  [16-29] (P) 26  BP: (108-121)/(54-67) (P) 121/76  Physical Exam    Constitutional: Lethargic, on BiPAP, no acute distress   Eyes: Pupils equal, sclerae anicteric, no conjunctival injection   HENT: NCAT, mucous membranes dry   Neck: Supple, no thyromegaly, no lymphadenopathy, trachea midline   Respiratory: Crackles noted throughout, BiPAP mild respiratory distress     Cardiovascular: RRR, no murmurs, rubs, or  gallops   Gastrointestinal: Positive bowel sounds, soft, nontender, nondistended   Musculoskeletal: No bilateral ankle edema, no clubbing or cyanosis to extremities   Psychiatric: Unable to assess mood and affect   neurologic: Lethargic, oriented x 0, does not answer any questions, does not follow commands skin: No rashes     Result Review    Result Review:  I have personally reviewed the results from the time of this admission to 8/8/2021 14:24 EDT and agree with these findings:  [x]  Laboratory  [x]  Microbiology  [x]  Radiology  [x]  EKG/Telemetry   []  Cardiology/Vascular   []  Pathology  []  Old records  []  Other:  Telemetry reviewed showed normal sinus rhythm    Assessment/Plan   Assessment / Plan     Assessment/Plan:  Acute hypoxic and hypercapnic respiratory failure requiring high flow nasal cannula/NIPPV  Recurrent right lower lobe pneumonia   Recurrent aspiration  Advanced dementia with worsening dysphagia  Hypernatremia  Metabolic alkalosis  JOSE RAUL  Chronically elevated troponin  JOSE RAUL  COPD, does not appear acutely exacerbated  Acute on chronic combined systolic and diastolic congestive heart failure with acute exacerbation  Hyperlipidemia  Recent right lower lobe postobstructive pneumonia with abscess    After long discussion with patient's family, they have made the difficult decision to pursue comfort measures only.  I feel this is appropriate as the patient has advanced dementia and would not benefit from a feeding tube as he will continue to have aspiration events and respiratory failure  Discontinue all unnecessary medications  Order morphine, Ativan, glycopyrrolate as needed for comfort  Will de-escalate BiPAP to nasal cannula for comfort  DC core track, DC lab draws, DC vital sign checks  Hospice has been consulted    Discussed plan with bedside RN, pulmonology, patient's son and JOSUE Alanis    DVT prophylaxis:  Medical and mechanical DVT prophylaxis orders are present.    CODE STATUS:   Limited  Support to NOT Include: Intubation  Code Status: No CPR  Medical Interventions (Level of Support Prior to Arrest): Limited      Electronically signed by Shravan Levy MD, 08/08/21, 2:24 PM EDT.

## 2021-08-08 NOTE — PLAN OF CARE
Goal Outcome Evaluation   Tube feeding on hold until can be evaluated by Dietary [email or web address] [email or web address]

## 2021-08-09 NOTE — CONSULTS
Purpose of the visit was to evaluate for: goals of care/advanced care planning and comfort care. Spoke with MD, RN, patient and family and discussed palliative care, goals of care, care options, Hospice services, discharge options and clarify code status.      Assessment:  Family has transitioned patient to comfort measures only over the weekend.  Patient is unresponsive.  He has oxygen in place via nasal cannula and his sats are 80-83 % during the visit.His hands and feet are warm to touch.  His urine is dark mick in the pure wick container.   He has no signs of pain or discomfort noted.  Dr. Schmitt visited with son Bayron while palliative care was present and we discussed discharging home with hospice.  Son Zeke arrived later and they wanted to take him home tomorrow as they need to get the house ready for him.  Hospice [facility] Notified.      Signs and symptoms of decline was provided and what to expect as he passes.  We discussed medications and what will be used at home and inserting a matta catheter for home care before he discharges. Family verbalized understanding.  He does not need a bed or oxygen at home he already has them through [facility].  Dr. Schmitt was notified of their request to discharge tomorrow.  Will continue to provide comfort.        Recommendations/Plan: [facility]osp[facility] notified of discharge plans for tomorrow.    Tasks Completed: EMS DNR and Emotional Support.    Other Comments: Palliative care will continue to provide support and assistance.  Collaborated with CHANEL Waite and ROBBY Brown.  Yudy Rock, BSN, RN, CHPN

## 2021-08-09 NOTE — PLAN OF CARE
Problem: Palliative Care  Goal: Enhanced Quality of Life  Outcome: Ongoing, Progressing  Intervention: Maximize Comfort  Recent Flowsheet Documentation  Taken 8/9/2021 1016 by Yudy Rock RN  Pain Management Interventions:   see MAR   quiet environment facilitated  Intervention: Optimize Function  Recent Flowsheet Documentation  Taken 8/9/2021 1016 by Yudy Rock RN  Sensory Stimulation Regulation:   quiet environment promoted   care clustered  Fatigue Management: (patient is dependent for all adls)   activity schedule adjusted   activity assistance provided   fatigue-related activity identified  Sleep/Rest Enhancement: (family at bedside)   regular sleep/rest pattern promoted   relaxation techniques promoted  Intervention: Promote Advance Care Planning  Recent Flowsheet Documentation  Taken 8/9/2021 1016 by Yudy Rock RN  Life Transition/Adjustment:   end-of-life care initiated   decision-making facilitated  Intervention: Optimize Psychosocial Wellbeing  Recent Flowsheet Documentation  Taken 8/9/2021 1016 by Yudy Rock RN  Supportive Measures:   active listening utilized   relaxation techniques promoted   decision-making supported   goal setting facilitated   verbalization of feelings encouraged   positive reinforcement provided   problem-solving facilitated  Grieving Process Facilitation: family/significant other support facilitated  Spiritual Activities Assistance: affirmation provided  Family/Support System Care:   caregiver stress acknowledged   family care conference arranged   involvement promoted   presence promoted   support provided     Problem: End-of-Life Care  Goal: Comfort, Peace and Preserved Dignity  Outcome: Ongoing, Progressing  Intervention: Promote Physical Comfort  Recent Flowsheet Documentation  Taken 8/9/2021 1016 by Yudy Rock RN  Sensory Stimulation Regulation:   quiet environment promoted   care clustered  Intervention: Promote Peace and Maintain Dignity  Recent Flowsheet  Documentation  Taken 8/9/2021 1016 by Yudy Rock, RN  Supportive Measures:   active listening utilized   relaxation techniques promoted   decision-making supported   goal setting facilitated   verbalization of feelings encouraged   positive reinforcement provided   problem-solving facilitated  Spiritual Activities Assistance: affirmation provided  Intervention: Support the Grieving Process  Recent Flowsheet Documentation  Taken 8/9/2021 1016 by Yudy Rock, RN  Life Transition/Adjustment:   end-of-life care initiated   decision-making facilitated  Family/Support System Care:   caregiver stress acknowledged   family care conference arranged   involvement promoted   presence promoted   support provided   KAL Hassan, RN, CHPN

## 2021-08-09 NOTE — PROGRESS NOTES
Pulmonary / Critical Care Progress Note      Patient Name: Alexander Rouse  : 1935  MRN: [id number]  Attending:  Shravan Schmitt MD  Date of admission: 2021    Subjective   Subjective   Follow-up for pneumonia    Family is decided to make patient comfort care  Patient cannot answer any questions or history  Patient is very weak and frail  No fevers noted    Review of Systems  Cannot obtain due to lethargy with altered mental status        Objective   Objective     Vitals:   Temp:  [98.1 °F (36.7 °C)-98.4 °F (36.9 °C)] 98.1 °F (36.7 °C)  Heart Rate:  [] 108  Resp:  [21-26] 21  BP: ()/(53-65) 110/65  Flow (L/min):  [3-4] 3    Physical Exam   Vital Signs Reviewed   WDWN, Alert, NAD.    HEENT:  PERRL, EOMI.  OP, nares clear  Chest:  good aeration, diminished with rhonchi bilaterally, tympanic to percussion bilaterally, no work of breathing noted  CV: RRR, no MGR, pulses 2+, equal.  Abd:  Soft, NT, ND, + BS, no HSM  EXT:  no clubbing, no cyanosis, no edema  Neuro:  A&Ox0, lethargic but confused, CN grossly intact, no focal deficits.  Skin: No rashes or lesions noted      Result Review    Result Review:  I have personally reviewed the results from the time of this admission to 2021 17:15 EDT and agree with these findings:  [x]  Laboratory  [x]  Microbiology  [x]  Radiology  []  EKG/Telemetry   []  Cardiology/Vascular   []  Pathology  []  Old records  []  Other:    Assessment/Plan   Assessment / Plan     Active Hospital Problems:  Active Hospital Problems    Diagnosis    • Pneumonia of left lower lobe due to infectious organism          Impression:  Acute hypoxic respiratory failure requiring HFNC  Recurrent RLL pneumonia  Recent Proteus and MRSA pneumonia  Question aspiration related  Hx of NSCL lung cancer with RUL resection in   Diastolic heart failure   COPD with acute exacerbation     Plan:  Patient now comfort measures  Continue medications for pain, secretion management, anxiety,  air hunger, delirium  Oxygen for comfort  Rest per primary     DNR        Labs, provider notes personally reviewed  Discussed with primary     I will sign off.  Please call with questions.    Electronically signed by Artur Randolph MD, 08/09/21, 5:15 PM EDT.

## 2021-08-09 NOTE — PLAN OF CARE
"Goal Outcome Evaluation:            Pt has been stable throughout the day today. Pt has remained unresponsive to verbal stimuli but appears to withdrawal from pain. Pt has had several family members at bedside today. Pt has had no concerning events noted. Was going to administer Ativan earlier in the shift as pt family was concerned about his \"restlessness\" upon entry to room, pt lying quietly with eyes closed and family declined med administration and \"will call if he does it again.\"  Roslyn Motley RN   "

## 2021-08-09 NOTE — PLAN OF CARE
Goal Outcome Evaluation:  Plan of Care Reviewed With: patient        Progress: no change  Outcome Summary: comfort care measures, paliative meds given PRN, will continue to monitor

## 2021-08-09 NOTE — PROGRESS NOTES
[facility]   Hospitalist Progress Note  Date: 2021  Patient Name: Alexander Rouse  : 1935  MRN: [id number]  Date of admission: 2021      Subjective   Subjective     Chief Complaint: Shortness of breath    Summary: 85 y.o. male with history of COPD, asthma, hyperlipidemia, systolic and diastolic CHF who was recently admitted to our facility from - for a lung abscess and right lower lobe postobstructive pneumonia.  [facility].  He was doing well up until today when he became short of breath.  History is obtained by the patient's son and ED physician as the patient is currently confused.  Staff found the patient to be difficult to arouse, they checked his oxygen saturation he was found to be 75% on 4 L nasal cannula.  He was brought to the emergency department where he was transitioned to 8 L high flow with improvement in his saturations.  Patient completed recent antibiotic therapy on  of cefdinir and  of Zyvox.  He is vaccinated against COVID-19.     He was admitted for further care, pulmonology was consulted. Chest x-ray revealed left lower lobe pneumonia.  He was started on vancomycin and cefepime. Patient was treated for recurrent aspiration with broad-spectrum antibiotics, bronchodilators, diuretics.  Speech, PT/OT were consulted.  He was cleared for modified diet initially.  Patient had worsening mentation for which neurology was consulted.  EEG and MRI were essentially negative.  After discussing with the family, the patient has had a stroke in the past with worsening dementia over the last few years.  Even on good days he is able to speak but can not have a cohesive conversation.  The patient has had worsening dysphagia and swallowing trouble as his dementia has progressed.  Initially, they hoped that the patient would improve during his hospital stay but unfortunately he did not.  He had one morning where he was a little more awake and  alert, and he ate all of his breakfast.  This was followed by significant worsening respiratory status likely due to recurrent aspiration.  He was placed on NIPPV.  Core track was placed for tube feeding.  Palliative care was consulted due to poor prognosis and goals of care discussion.  Ultimately, the decision was made to pursue comfort measures only and discontinue BiPAP and tube feeding.  Patient made comfort measures on 8/8.  At this point, likely home with hospice on 8/10.     Interval Followup: No events overnight.  Patient is resting comfortably.  Son is at bedside and is at peace with their decision.  They feel they are doing the right thing for him, and I agree.  Patient is still somnolent, lethargic, not answering any questions but appears comfortable and breathing comfortably.    Review of Systems   A 10 point review of systems was attempted to be obtained but could not due to the patient's mentation    Objective   Objective     Vitals:   Temp:  [98.1 °F (36.7 °C)-98.4 °F (36.9 °C)] 98.1 °F (36.7 °C)  Heart Rate:  [] 108  Resp:  [21-26] 21  BP: ()/(53-65) 110/65  Flow (L/min):  [3-4] 3  Physical Exam    Constitutional: Lethargic, resting comfortably on arrival, no acute distress   Eyes: Pupils equal, sclerae anicteric, no conjunctival injection   HENT: NCAT, mucous membranes    Neck: Supple, no thyromegaly, no lymphadenopathy, trachea midline   Respiratory: Crackles noted throughout, nasal cannula in place, no increased WOB   Cardiovascular: RRR, no murmurs, rubs, or gallops   Gastrointestinal: Positive bowel sounds, soft, nontender, nondistended   Musculoskeletal: No bilateral ankle edema, no clubbing or cyanosis to extremities   Psychiatric: Unable to assess mood and affect   neurologic: Lethargic, neuro exam deferred   skin: No rashes     Result Review    Result Review:  I have personally reviewed the results from the time of this admission to 8/9/2021 14:43 EDT and agree with these  findings:  []  Laboratory  []  Microbiology  []  Radiology  []  EKG/Telemetry   []  Cardiology/Vascular   []  Pathology  []  Old records  []  Other:    Assessment/Plan   Assessment / Plan     Assessment/Plan:  Acute hypoxic and hypercapnic respiratory failure requiring high flow nasal cannula/NIPPV  Recurrent right lower lobe pneumonia   Recurrent aspiration  Advanced dementia with worsening dysphagia  Hypernatremia  Metabolic alkalosis  JOSE RAUL  Chronically elevated troponin  JOSE RAUL  COPD, does not appear acutely exacerbated  Acute on chronic combined systolic and diastolic congestive heart failure with acute exacerbation  Hyperlipidemia  Recent right lower lobe postobstructive pneumonia with abscess    Patient is now comfort measures only, lab draws, telemetry, tube feeding discontinued  Continue morphine, Ativan, glycopyrrolate as needed for comfort  Palliative care is following, hospice has been consulted  Likely plans to discharge home tomorrow with hospice care when all durable medical equipment is set up    Discussed plan with bedside RN, palliative care, family at bedside    DVT prophylaxis:  Mechanical DVT prophylaxis orders are present.    CODE STATUS:   Level Of Support Discussed With: Health Care Surrogate  Code Status: No CPR  Medical Interventions (Level of Support Prior to Arrest): Comfort Measures      Electronically signed by Shravan Levy MD, 08/09/21, 2:43 PM EDT.

## 2021-08-10 NOTE — DISCHARGE SUMMARY
[facility]         HOSPITALIST DEATH SUMMARY    Patient Name: Alexander Rouse  : 1935  MRN: [id number]    Date of Admission: 2021  Date of death:  8/10/2021 at 0741  Primary Care Physician: Sharif Singer MD    Consults     Date and Time Order Name Status Description    2021  4:03 PM Inpatient Neurology Consult General      8/3/2021  8:22 AM Inpatient Pulmonology Consult      2021  6:02 AM Hospitalist (on-call MD unless specified) Completed     7/15/2021  7:39 AM Inpatient Pulmonology Consult Completed     2021  8:51 PM Inpatient Cardiology Consult Completed     2021  4:43 PM Hospitalist (on-call MD unless specified) Completed     2021  4:37 PM Cardiology (on-call MD unless specified) Completed           Active and Resolved Hospital Problems:  Active Hospital Problems    Diagnosis POA   • Pneumonia of left lower lobe due to infectious organism [J18.9] Yes      Resolved Hospital Problems   No resolved problems to display.   Advanced dementia with worsening dysphagia  Recurrent right lower lobe pneumonia   Recurrent aspiration  Acute hypoxic and hypercapnic respiratory failure requiring high flow nasal cannula/NIPPV  Hypernatremia  Metabolic alkalosis  JOSE RAUL  Chronically elevated troponin  JOSE RAUL  COPD, does not appear acutely exacerbated  Acute on chronic combined systolic and diastolic congestive heart failure with acute exacerbation  Hyperlipidemia  Recent right lower lobe postobstructive pneumonia with abscess     Hospital Course     Hospital Course:  Alexander Rouse is a 85 y.o. male with history of COPD, asthma, advanced dementia with dysphagia, hyperlipidemia, systolic and diastolic CHF who was recently admitted to our facility from - for a lung abscess and right lower lobe postobstructive pneumonia.  [facility].  He was doing well up until day of admission when he became short of breath.  History was obtained by the  patient's son and ED physician as the patient was confused.  Staff found the patient to be difficult to arouse, they checked his oxygen saturation he was found to be 75% on 4 L nasal cannula.  He was brought to the emergency department where he was transitioned to 8 L high flow with improvement in his saturations.  Patient completed recent antibiotic therapy on 7/21 of cefdinir and 7/23 of Zyvox.  He is vaccinated against COVID-19.     He was admitted for further care, pulmonology was consulted. Chest x-ray revealed left lower lobe pneumonia.  He was started on vancomycin and cefepime. Patient was treated for recurrent aspiration with broad-spectrum antibiotics, bronchodilators, diuretics.  Speech, PT/OT were consulted.  He was cleared for modified diet initially.  Patient had worsening mentation for which neurology was consulted.  EEG and MRI were essentially negative.  After discussing with the family, the patient has had a stroke in the past with worsening dementia over the last few years.  Even on good days he is able to speak but can not have a cohesive conversation.  The patient has had worsening dysphagia and swallowing trouble as his dementia has progressed.  Initially, they hoped that the patient would improve during his hospital stay but unfortunately he did not.  He had one morning where he was a little more awake and alert, and he ate all of his breakfast.  This was followed by significant worsening respiratory status likely due to recurrent aspiration.  He was placed on NIPPV.  Core track was placed for tube feeding.  Palliative care was consulted due to poor prognosis and goals of care discussion.  Ultimately, the decision was made to pursue comfort measures only and discontinue BiPAP and tube feeding.  Patient made comfort measures on 8/8.  Patient passed away on 8/10/2021 at 0741, under the care of comfort measures only.    Electronically signed by Mychal Servin MD, 08/10/21, 9:24 AM EDT.

## 2021-08-10 NOTE — PLAN OF CARE
Goal Outcome Evaluation:         Plans to send patient home, currently on comfort care only. Family remains at bedside.me

## 2021-08-10 NOTE — NURSING NOTE
Was notified by aide that patients O2 sat was dropping and breathing was changing. Walked into room to assess patient and O2 was in low 60's, dropping into 50's. No heart beat or lung sounds upon auscultation. Son was at bedside. Made son aware and retrieved second nurse for verification. Time of death called at 0741.

## 2021-08-10 NOTE — PLAN OF CARE
Patient  this morning at 0741, family is at bedside.  Emotional support provided.  Allowed them to do life review and talk about memories of his life.  He passed on his youngest's son's birthday.  The family voiced their appreciation for all the staff caring for the patient and were pleased with how peacefully he passed.  They are waiting on two more family members to arrive.  Notified maddy Rice, Collaborated with RN and assisted with paperwork.  Notified [facility] of patient's passing.  GADIEL HassanN, RN, CHPN

## 2021-08-27 LAB
MYCOBACTERIUM SPEC CULT: NORMAL
MYCOBACTERIUM SPEC CULT: NORMAL
NIGHT BLUE STAIN TISS: NORMAL
